# Patient Record
Sex: FEMALE | Race: WHITE | NOT HISPANIC OR LATINO | Employment: OTHER | URBAN - METROPOLITAN AREA
[De-identification: names, ages, dates, MRNs, and addresses within clinical notes are randomized per-mention and may not be internally consistent; named-entity substitution may affect disease eponyms.]

---

## 2018-11-06 ENCOUNTER — RECORDS - HEALTHEAST (OUTPATIENT)
Dept: LAB | Facility: CLINIC | Age: 83
End: 2018-11-06

## 2018-11-06 LAB — TSH SERPL DL<=0.005 MIU/L-ACNC: 0.99 UIU/ML (ref 0.3–5)

## 2019-11-07 ENCOUNTER — RECORDS - HEALTHEAST (OUTPATIENT)
Dept: LAB | Facility: CLINIC | Age: 84
End: 2019-11-07

## 2019-11-07 LAB — TSH SERPL DL<=0.005 MIU/L-ACNC: 1.67 UIU/ML (ref 0.3–5)

## 2020-01-04 ENCOUNTER — HOSPITAL ENCOUNTER (INPATIENT)
Facility: CLINIC | Age: 85
LOS: 10 days | Discharge: NURSING, REHAB OR LONG-TERM CARE | DRG: 516 | End: 2020-01-14
Attending: INTERNAL MEDICINE | Admitting: SURGERY
Payer: COMMERCIAL

## 2020-01-04 ENCOUNTER — APPOINTMENT (OUTPATIENT)
Dept: MRI IMAGING | Facility: CLINIC | Age: 85
DRG: 516 | End: 2020-01-04
Attending: STUDENT IN AN ORGANIZED HEALTH CARE EDUCATION/TRAINING PROGRAM
Payer: COMMERCIAL

## 2020-01-04 ENCOUNTER — APPOINTMENT (OUTPATIENT)
Dept: CT IMAGING | Facility: CLINIC | Age: 85
DRG: 516 | End: 2020-01-04
Attending: INTERNAL MEDICINE
Payer: COMMERCIAL
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
## 2020-01-04 DIAGNOSIS — S32.10XA CLOSED FRACTURE OF SACRUM, UNSPECIFIED PORTION OF SACRUM, INITIAL ENCOUNTER (H): ICD-10-CM
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
## 2020-01-04 DIAGNOSIS — Z98.890 S/P SPINAL SURGERY: ICD-10-CM

## 2020-01-04 DIAGNOSIS — S32.050A COMPRESSION FRACTURE OF L5 VERTEBRA, INITIAL ENCOUNTER (H): Primary | ICD-10-CM

## 2020-01-04 DIAGNOSIS — R32 URINARY INCONTINENCE, UNSPECIFIED TYPE: ICD-10-CM

## 2020-01-04 DIAGNOSIS — R20.0 SADDLE ANESTHESIA: ICD-10-CM

## 2020-01-04 LAB
ABO + RH BLD: NORMAL
ABO + RH BLD: NORMAL
ALBUMIN SERPL-MCNC: 3.5 G/DL (ref 3.4–5)
ALP SERPL-CCNC: 118 U/L (ref 40–150)
ALT SERPL W P-5'-P-CCNC: 21 U/L (ref 0–50)
ANION GAP SERPL CALCULATED.3IONS-SCNC: 5 MMOL/L (ref 3–14)
AST SERPL W P-5'-P-CCNC: 18 U/L (ref 0–45)
BASOPHILS # BLD AUTO: 0.1 10E9/L (ref 0–0.2)
BASOPHILS NFR BLD AUTO: 1 %
BILIRUB SERPL-MCNC: 0.5 MG/DL (ref 0.2–1.3)
BLD GP AB SCN SERPL QL: NORMAL
BLOOD BANK CMNT PATIENT-IMP: NORMAL
BUN SERPL-MCNC: 19 MG/DL (ref 7–30)
CALCIUM SERPL-MCNC: 9.2 MG/DL (ref 8.5–10.1)
CHLORIDE SERPL-SCNC: 101 MMOL/L (ref 94–109)
CO2 SERPL-SCNC: 27 MMOL/L (ref 20–32)
CREAT SERPL-MCNC: 0.92 MG/DL (ref 0.52–1.04)
DIFFERENTIAL METHOD BLD: ABNORMAL
EOSINOPHIL # BLD AUTO: 0.2 10E9/L (ref 0–0.7)
EOSINOPHIL NFR BLD AUTO: 2.3 %
ERYTHROCYTE [DISTWIDTH] IN BLOOD BY AUTOMATED COUNT: 12.6 % (ref 10–15)
GFR SERPL CREATININE-BSD FRML MDRD: 55 ML/MIN/{1.73_M2}
GLUCOSE SERPL-MCNC: 95 MG/DL (ref 70–99)
HCT VFR BLD AUTO: 35.7 % (ref 35–47)
HGB BLD-MCNC: 12 G/DL (ref 11.7–15.7)
IMM GRANULOCYTES # BLD: 0.1 10E9/L (ref 0–0.4)
IMM GRANULOCYTES NFR BLD: 1.2 %
INR PPP: 1.06 (ref 0.86–1.14)
LYMPHOCYTES # BLD AUTO: 1.6 10E9/L (ref 0.8–5.3)
LYMPHOCYTES NFR BLD AUTO: 17.2 %
MCH RBC QN AUTO: 35.2 PG (ref 26.5–33)
MCHC RBC AUTO-ENTMCNC: 33.6 G/DL (ref 31.5–36.5)
MCV RBC AUTO: 105 FL (ref 78–100)
MONOCYTES # BLD AUTO: 1 10E9/L (ref 0–1.3)
MONOCYTES NFR BLD AUTO: 10.6 %
NEUTROPHILS # BLD AUTO: 6.3 10E9/L (ref 1.6–8.3)
NEUTROPHILS NFR BLD AUTO: 67.7 %
NRBC # BLD AUTO: 0 10*3/UL
NRBC BLD AUTO-RTO: 0 /100
PLATELET # BLD AUTO: 210 10E9/L (ref 150–450)
POTASSIUM SERPL-SCNC: 4.1 MMOL/L (ref 3.4–5.3)
PROT SERPL-MCNC: 7 G/DL (ref 6.8–8.8)
RBC # BLD AUTO: 3.41 10E12/L (ref 3.8–5.2)
SODIUM SERPL-SCNC: 133 MMOL/L (ref 133–144)
SPECIMEN EXP DATE BLD: NORMAL
WBC # BLD AUTO: 9.3 10E9/L (ref 4–11)

## 2020-01-04 PROCEDURE — 99291 CRITICAL CARE FIRST HOUR: CPT | Mod: Z6 | Performed by: INTERNAL MEDICINE

## 2020-01-04 PROCEDURE — 85610 PROTHROMBIN TIME: CPT | Performed by: INTERNAL MEDICINE

## 2020-01-04 PROCEDURE — 25800030 ZZH RX IP 258 OP 636: Performed by: INTERNAL MEDICINE

## 2020-01-04 PROCEDURE — 80053 COMPREHEN METABOLIC PANEL: CPT | Performed by: INTERNAL MEDICINE

## 2020-01-04 PROCEDURE — 85025 COMPLETE CBC W/AUTO DIFF WBC: CPT | Performed by: INTERNAL MEDICINE

## 2020-01-04 PROCEDURE — 86901 BLOOD TYPING SEROLOGIC RH(D): CPT | Performed by: INTERNAL MEDICINE

## 2020-01-04 PROCEDURE — 25000132 ZZH RX MED GY IP 250 OP 250 PS 637: Performed by: STUDENT IN AN ORGANIZED HEALTH CARE EDUCATION/TRAINING PROGRAM

## 2020-01-04 PROCEDURE — 68200002 ZZH TRAUMA EVALUATION W/O CC LEVEL II: Performed by: INTERNAL MEDICINE

## 2020-01-04 PROCEDURE — 72192 CT PELVIS W/O DYE: CPT

## 2020-01-04 PROCEDURE — 12000001 ZZH R&B MED SURG/OB UMMC

## 2020-01-04 PROCEDURE — 25000132 ZZH RX MED GY IP 250 OP 250 PS 637: Performed by: INTERNAL MEDICINE

## 2020-01-04 PROCEDURE — 96361 HYDRATE IV INFUSION ADD-ON: CPT | Performed by: INTERNAL MEDICINE

## 2020-01-04 PROCEDURE — 86850 RBC ANTIBODY SCREEN: CPT | Performed by: INTERNAL MEDICINE

## 2020-01-04 PROCEDURE — 99285 EMERGENCY DEPT VISIT HI MDM: CPT | Mod: 25 | Performed by: INTERNAL MEDICINE

## 2020-01-04 PROCEDURE — 25000125 ZZHC RX 250: Performed by: INTERNAL MEDICINE

## 2020-01-04 PROCEDURE — 86900 BLOOD TYPING SEROLOGIC ABO: CPT | Performed by: INTERNAL MEDICINE

## 2020-01-04 PROCEDURE — 72146 MRI CHEST SPINE W/O DYE: CPT

## 2020-01-04 PROCEDURE — 51798 US URINE CAPACITY MEASURE: CPT | Performed by: INTERNAL MEDICINE

## 2020-01-04 PROCEDURE — 96360 HYDRATION IV INFUSION INIT: CPT | Performed by: INTERNAL MEDICINE

## 2020-01-04 RX ORDER — BRIMONIDINE TARTRATE 2 MG/ML
2 SOLUTION/ DROPS OPHTHALMIC ONCE
Status: COMPLETED | OUTPATIENT
Start: 2020-01-04 | End: 2020-01-04

## 2020-01-04 RX ORDER — LEVOTHYROXINE SODIUM 75 UG/1
75 TABLET ORAL DAILY
COMMUNITY

## 2020-01-04 RX ORDER — DORZOLAMIDE HYDROCHLORIDE AND TIMOLOL MALEATE 20; 5 MG/ML; MG/ML
2 SOLUTION/ DROPS OPHTHALMIC ONCE
Status: COMPLETED | OUTPATIENT
Start: 2020-01-04 | End: 2020-01-04

## 2020-01-04 RX ORDER — SODIUM CHLORIDE, SODIUM LACTATE, POTASSIUM CHLORIDE, CALCIUM CHLORIDE 600; 310; 30; 20 MG/100ML; MG/100ML; MG/100ML; MG/100ML
INJECTION, SOLUTION INTRAVENOUS CONTINUOUS
Status: DISCONTINUED | OUTPATIENT
Start: 2020-01-04 | End: 2020-01-05

## 2020-01-04 RX ORDER — MULTIPLE VITAMINS W/ MINERALS TAB 9MG-400MCG
1 TAB ORAL DAILY
COMMUNITY

## 2020-01-04 RX ORDER — CALCIUM ACETATE 667 MG/1
500 CAPSULE ORAL DAILY
COMMUNITY

## 2020-01-04 RX ORDER — DORZOLAMIDE HYDROCHLORIDE AND TIMOLOL MALEATE 20; 5 MG/ML; MG/ML
2 SOLUTION/ DROPS OPHTHALMIC 2 TIMES DAILY
COMMUNITY

## 2020-01-04 RX ORDER — OXYCODONE HYDROCHLORIDE 5 MG/1
5 TABLET ORAL EVERY 6 HOURS PRN
Status: DISCONTINUED | OUTPATIENT
Start: 2020-01-04 | End: 2020-01-05

## 2020-01-04 RX ORDER — ASPIRIN 81 MG/1
81 TABLET ORAL DAILY
COMMUNITY

## 2020-01-04 RX ORDER — BRIMONIDINE TARTRATE 1.5 MG/ML
2 SOLUTION/ DROPS OPHTHALMIC 2 TIMES DAILY
COMMUNITY

## 2020-01-04 RX ADMIN — BRIMONIDINE TARTRATE 2 DROP: 2 SOLUTION/ DROPS OPHTHALMIC at 22:44

## 2020-01-04 RX ADMIN — SODIUM CHLORIDE, POTASSIUM CHLORIDE, SODIUM LACTATE AND CALCIUM CHLORIDE: 600; 310; 30; 20 INJECTION, SOLUTION INTRAVENOUS at 22:29

## 2020-01-04 RX ADMIN — DORZOLAMIDE HYDROCHLORIDE AND TIMOLOL MALEATE 2 DROP: 20; 5 SOLUTION/ DROPS OPHTHALMIC at 22:45

## 2020-01-04 RX ADMIN — OXYCODONE HYDROCHLORIDE 5 MG: 5 TABLET ORAL at 22:47

## 2020-01-04 ASSESSMENT — MIFFLIN-ST. JEOR: SCORE: 956.23

## 2020-01-04 ASSESSMENT — ENCOUNTER SYMPTOMS
NUMBNESS: 1
FEVER: 0

## 2020-01-04 NOTE — LETTER
Health Information Management Services               Recipient: Iman Dasilva          Sender: YUDI Downey [phone]          Date: January 14, 2020  Patient Name:  Jyoti De Jesus  Routing Message:  Discharge Orders  [id number]          The documents accompanying this notice contain confidential information belonging to the sender.  This information is intended only for the use of the individual or entity named above.  The authorized recipient of this information is prohibited from disclosing this information to any other party and is required to destroy the information after its stated need has been fulfilled, unless otherwise required by state law.      If you are not the intended recipient, you are hereby notified that any disclosure, copy, distribution or action taken in reliance on the contents of these documents is strictly prohibited.  If you have received this document in error, please return it by fax to [phone] with a note on the cover sheet explaining why you are returning it (e.g. not your patient, not your provider, etc.).  If you need further assistance, please call [employer] Centralized Transcription at [phone].  Documents may also be returned by mail to [employer], , [facility] [address], [place], [place] [address].

## 2020-01-04 NOTE — LETTER
Transition Communication Hand-off for Care Transitions to Next Level of Care Provider    Name: Jyoti De Jesus  : 1928  MRN #: [id number]  Primary Care Provider: Jet Matt     Primary Clinic: [facility] [address]  [facility] [address]     Reason for Hospitalization:  Saddle anesthesia [R20.0]  Fall, initial encounter [W19.XXXA]  Closed fracture of sacrum, unspecified portion of sacrum, initial encounter (H) [S32.10XA]  Urinary incontinence, unspecified type [R32]  Admit Date/Time: 2020  5:57 PM  Discharge Date: 2020 2:00 PM  Payor Source: Payor: MEDICA / Plan: MEDICA ADVANT[employer]IONS / Product Type: HMO /          Reason for Communication Hand-off Referral: Other continuity of care    Discharge Plan: [facility]        Concern for non-adherence with plan of care:   NO  Discharge Needs Assessment:  Needs      Most Recent Value   Equipment Currently Used at Home  none          Future Appointments   Date Time Provider Department Center   2020 12:30 PM Wai Mason MD [facility]   3/19/2020  2:45 PM Wai Mason MD [facility]       Any outstanding tests or procedures:        Referrals     Future Labs/Procedures    Occupational Therapy Adult Consult     Comments:    Evaluate and treat as clinically indicated.    Reason:  Postop fixation of fragility sacral fracture    Physical Therapy Adult Consult     Comments:    Evaluate and treat as clinically indicated.    Reason:  Postop fixation of fragility sacral fracture            YUDI Corral  5A/10A Ortho/Med/Surg & [facility] Adult ED  Phone: [phone] Pager: [phone]

## 2020-01-05 ENCOUNTER — APPOINTMENT (OUTPATIENT)
Dept: GENERAL RADIOLOGY | Facility: CLINIC | Age: 85
DRG: 516 | End: 2020-01-05
Attending: ORTHOPAEDIC SURGERY
Payer: COMMERCIAL

## 2020-01-05 ENCOUNTER — APPOINTMENT (OUTPATIENT)
Dept: MRI IMAGING | Facility: CLINIC | Age: 85
DRG: 516 | End: 2020-01-05
Attending: STUDENT IN AN ORGANIZED HEALTH CARE EDUCATION/TRAINING PROGRAM
Payer: COMMERCIAL

## 2020-01-05 ENCOUNTER — APPOINTMENT (OUTPATIENT)
Dept: MRI IMAGING | Facility: CLINIC | Age: 85
DRG: 516 | End: 2020-01-05
Payer: COMMERCIAL

## 2020-01-05 ENCOUNTER — ANESTHESIA EVENT (OUTPATIENT)
Dept: SURGERY | Facility: CLINIC | Age: 85
DRG: 516 | End: 2020-01-05
Payer: COMMERCIAL

## 2020-01-05 ENCOUNTER — ANESTHESIA (OUTPATIENT)
Dept: SURGERY | Facility: CLINIC | Age: 85
DRG: 516 | End: 2020-01-05
Payer: COMMERCIAL

## 2020-01-05 PROBLEM — S32.050A COMPRESSION FRACTURE OF L5 VERTEBRA (H): Status: ACTIVE | Noted: 2020-01-05

## 2020-01-05 PROBLEM — Z98.890 S/P SPINAL SURGERY: Status: ACTIVE | Noted: 2020-01-05

## 2020-01-05 LAB
ALBUMIN UR-MCNC: NEGATIVE MG/DL
APPEARANCE UR: CLEAR
BILIRUB UR QL STRIP: NEGATIVE
COLOR UR AUTO: ABNORMAL
GLUCOSE UR STRIP-MCNC: NEGATIVE MG/DL
HGB UR QL STRIP: NEGATIVE
KETONES UR STRIP-MCNC: 10 MG/DL
LEUKOCYTE ESTERASE UR QL STRIP: NEGATIVE
NITRATE UR QL: NEGATIVE
PH UR STRIP: 5.5 PH (ref 5–7)
SOURCE: ABNORMAL
SP GR UR STRIP: 1.01 (ref 1–1.03)
UROBILINOGEN UR STRIP-MCNC: NORMAL MG/DL (ref 0–2)

## 2020-01-05 PROCEDURE — 40000170 ZZH STATISTIC PRE-PROCEDURE ASSESSMENT II: Performed by: ORTHOPAEDIC SURGERY

## 2020-01-05 PROCEDURE — 25000128 H RX IP 250 OP 636: Performed by: ORTHOPAEDIC SURGERY

## 2020-01-05 PROCEDURE — 71000014 ZZH RECOVERY PHASE 1 LEVEL 2 FIRST HR: Performed by: ORTHOPAEDIC SURGERY

## 2020-01-05 PROCEDURE — 12000001 ZZH R&B MED SURG/OB UMMC

## 2020-01-05 PROCEDURE — 25000132 ZZH RX MED GY IP 250 OP 250 PS 637: Performed by: STUDENT IN AN ORGANIZED HEALTH CARE EDUCATION/TRAINING PROGRAM

## 2020-01-05 PROCEDURE — 25000128 H RX IP 250 OP 636: Performed by: ANESTHESIOLOGY

## 2020-01-05 PROCEDURE — 25000128 H RX IP 250 OP 636

## 2020-01-05 PROCEDURE — 37000008 ZZH ANESTHESIA TECHNICAL FEE, 1ST 30 MIN: Performed by: ORTHOPAEDIC SURGERY

## 2020-01-05 PROCEDURE — 25000125 ZZHC RX 250: Performed by: ORTHOPAEDIC SURGERY

## 2020-01-05 PROCEDURE — 37000009 ZZH ANESTHESIA TECHNICAL FEE, EACH ADDTL 15 MIN: Performed by: ORTHOPAEDIC SURGERY

## 2020-01-05 PROCEDURE — 25000125 ZZHC RX 250: Performed by: NURSE ANESTHETIST, CERTIFIED REGISTERED

## 2020-01-05 PROCEDURE — 25800030 ZZH RX IP 258 OP 636: Performed by: STUDENT IN AN ORGANIZED HEALTH CARE EDUCATION/TRAINING PROGRAM

## 2020-01-05 PROCEDURE — 25000128 H RX IP 250 OP 636: Performed by: NURSE ANESTHETIST, CERTIFIED REGISTERED

## 2020-01-05 PROCEDURE — C1769 GUIDE WIRE: HCPCS | Performed by: ORTHOPAEDIC SURGERY

## 2020-01-05 PROCEDURE — 25000125 ZZHC RX 250: Performed by: STUDENT IN AN ORGANIZED HEALTH CARE EDUCATION/TRAINING PROGRAM

## 2020-01-05 PROCEDURE — 27210794 ZZH OR GENERAL SUPPLY STERILE: Performed by: ORTHOPAEDIC SURGERY

## 2020-01-05 PROCEDURE — 25800030 ZZH RX IP 258 OP 636: Performed by: ANESTHESIOLOGY

## 2020-01-05 PROCEDURE — C1713 ANCHOR/SCREW BN/BN,TIS/BN: HCPCS | Performed by: ORTHOPAEDIC SURGERY

## 2020-01-05 PROCEDURE — 99207 ZZC CONSULT E&M CHANGED TO SUBSEQUENT LEVEL: CPT | Performed by: INTERNAL MEDICINE

## 2020-01-05 PROCEDURE — 27211020 ZZHC OR CELL SAVER OPNP: Performed by: ORTHOPAEDIC SURGERY

## 2020-01-05 PROCEDURE — 71000015 ZZH RECOVERY PHASE 1 LEVEL 2 EA ADDTL HR: Performed by: ORTHOPAEDIC SURGERY

## 2020-01-05 PROCEDURE — 27810169 ZZH OR IMPLANT GENERAL: Performed by: ORTHOPAEDIC SURGERY

## 2020-01-05 PROCEDURE — 25800030 ZZH RX IP 258 OP 636: Performed by: NURSE ANESTHETIST, CERTIFIED REGISTERED

## 2020-01-05 PROCEDURE — 72195 MRI PELVIS W/O DYE: CPT

## 2020-01-05 PROCEDURE — 27211024 ZZHC OR SUPPLY OTHER OPNP: Performed by: ORTHOPAEDIC SURGERY

## 2020-01-05 PROCEDURE — 36000066 ZZH SURGERY LEVEL 4 W FLUORO 1ST 30 MIN - UMMC: Performed by: ORTHOPAEDIC SURGERY

## 2020-01-05 PROCEDURE — 40000280 XR SURGERY CARM FLUORO GREATER THAN 5 MIN: Mod: TC

## 2020-01-05 PROCEDURE — 27210995 ZZH RX 272: Performed by: ORTHOPAEDIC SURGERY

## 2020-01-05 PROCEDURE — 25800025 ZZH RX 258: Performed by: ORTHOPAEDIC SURGERY

## 2020-01-05 PROCEDURE — 36000064 ZZH SURGERY LEVEL 4 EA 15 ADDTL MIN - UMMC: Performed by: ORTHOPAEDIC SURGERY

## 2020-01-05 PROCEDURE — 25000565 ZZH ISOFLURANE, EA 15 MIN: Performed by: ORTHOPAEDIC SURGERY

## 2020-01-05 PROCEDURE — 99232 SBSQ HOSP IP/OBS MODERATE 35: CPT | Performed by: INTERNAL MEDICINE

## 2020-01-05 PROCEDURE — 72148 MRI LUMBAR SPINE W/O DYE: CPT

## 2020-01-05 PROCEDURE — 81003 URINALYSIS AUTO W/O SCOPE: CPT | Performed by: STUDENT IN AN ORGANIZED HEALTH CARE EDUCATION/TRAINING PROGRAM

## 2020-01-05 DEVICE — IMP ROD MEDT SOLERA TIAL STR LINED 5.5X500MM 1554200500: Type: IMPLANTABLE DEVICE | Site: BACK | Status: FUNCTIONAL

## 2020-01-05 DEVICE — IMPLANTABLE DEVICE: Type: IMPLANTABLE DEVICE | Site: BACK | Status: FUNCTIONAL

## 2020-01-05 DEVICE — VERTEBROPLASTY CEMENT W/BONE MIXER C01B: Type: IMPLANTABLE DEVICE | Site: BACK | Status: FUNCTIONAL

## 2020-01-05 DEVICE — IMP SCR MEDT 5.5/6.0MM SOLERA 5.5X35MM MA 55840005535: Type: IMPLANTABLE DEVICE | Site: BACK | Status: FUNCTIONAL

## 2020-01-05 DEVICE — IMP SCR SET MEDT SOLERA BREAK OFF 5.5MM TI 5540030: Type: IMPLANTABLE DEVICE | Site: BACK | Status: FUNCTIONAL

## 2020-01-05 DEVICE — BONE CEMENT KYPHX HV-R C01A: Type: IMPLANTABLE DEVICE | Site: BACK | Status: FUNCTIONAL

## 2020-01-05 RX ORDER — ONDANSETRON 2 MG/ML
4 INJECTION INTRAMUSCULAR; INTRAVENOUS EVERY 30 MIN PRN
Status: DISCONTINUED | OUTPATIENT
Start: 2020-01-05 | End: 2020-01-05 | Stop reason: HOSPADM

## 2020-01-05 RX ORDER — LIDOCAINE HYDROCHLORIDE 20 MG/ML
INJECTION, SOLUTION INFILTRATION; PERINEURAL PRN
Status: DISCONTINUED | OUTPATIENT
Start: 2020-01-05 | End: 2020-01-05

## 2020-01-05 RX ORDER — ONDANSETRON 2 MG/ML
4 INJECTION INTRAMUSCULAR; INTRAVENOUS EVERY 6 HOURS PRN
Status: DISCONTINUED | OUTPATIENT
Start: 2020-01-05 | End: 2020-01-14 | Stop reason: HOSPADM

## 2020-01-05 RX ORDER — LIDOCAINE 40 MG/G
CREAM TOPICAL
Status: CANCELLED | OUTPATIENT
Start: 2020-01-05

## 2020-01-05 RX ORDER — CALCIUM ACETATE 667 MG/1
500 CAPSULE ORAL DAILY
Status: DISCONTINUED | OUTPATIENT
Start: 2020-01-05 | End: 2020-01-05

## 2020-01-05 RX ORDER — FENTANYL CITRATE 50 UG/ML
INJECTION, SOLUTION INTRAMUSCULAR; INTRAVENOUS PRN
Status: DISCONTINUED | OUTPATIENT
Start: 2020-01-05 | End: 2020-01-05

## 2020-01-05 RX ORDER — BRIMONIDINE TARTRATE 2 MG/ML
2 SOLUTION/ DROPS OPHTHALMIC 2 TIMES DAILY
Status: DISCONTINUED | OUTPATIENT
Start: 2020-01-05 | End: 2020-01-14 | Stop reason: HOSPADM

## 2020-01-05 RX ORDER — HYDROMORPHONE HYDROCHLORIDE 1 MG/ML
.1-.2 INJECTION, SOLUTION INTRAMUSCULAR; INTRAVENOUS; SUBCUTANEOUS EVERY 5 MIN PRN
Status: DISCONTINUED | OUTPATIENT
Start: 2020-01-05 | End: 2020-01-05 | Stop reason: HOSPADM

## 2020-01-05 RX ORDER — CALCIUM CARBONATE 500(1250)
500 TABLET ORAL 2 TIMES DAILY WITH MEALS
Status: DISCONTINUED | OUTPATIENT
Start: 2020-01-05 | End: 2020-01-07

## 2020-01-05 RX ORDER — ONDANSETRON 4 MG/1
4 TABLET, ORALLY DISINTEGRATING ORAL EVERY 6 HOURS PRN
Status: DISCONTINUED | OUTPATIENT
Start: 2020-01-05 | End: 2020-01-14 | Stop reason: HOSPADM

## 2020-01-05 RX ORDER — BUPIVACAINE HYDROCHLORIDE AND EPINEPHRINE 2.5; 5 MG/ML; UG/ML
INJECTION, SOLUTION INFILTRATION; PERINEURAL PRN
Status: DISCONTINUED | OUTPATIENT
Start: 2020-01-05 | End: 2020-01-05 | Stop reason: HOSPADM

## 2020-01-05 RX ORDER — ACETAMINOPHEN 325 MG/1
650 TABLET ORAL EVERY 4 HOURS PRN
Status: DISCONTINUED | OUTPATIENT
Start: 2020-01-05 | End: 2020-01-05

## 2020-01-05 RX ORDER — ONDANSETRON 4 MG/1
4 TABLET, ORALLY DISINTEGRATING ORAL EVERY 30 MIN PRN
Status: DISCONTINUED | OUTPATIENT
Start: 2020-01-05 | End: 2020-01-05 | Stop reason: HOSPADM

## 2020-01-05 RX ORDER — SODIUM CHLORIDE, SODIUM LACTATE, POTASSIUM CHLORIDE, CALCIUM CHLORIDE 600; 310; 30; 20 MG/100ML; MG/100ML; MG/100ML; MG/100ML
INJECTION, SOLUTION INTRAVENOUS CONTINUOUS
Status: DISCONTINUED | OUTPATIENT
Start: 2020-01-05 | End: 2020-01-05 | Stop reason: HOSPADM

## 2020-01-05 RX ORDER — OXYCODONE HYDROCHLORIDE 5 MG/1
5 TABLET ORAL
Status: DISCONTINUED | OUTPATIENT
Start: 2020-01-05 | End: 2020-01-14 | Stop reason: HOSPADM

## 2020-01-05 RX ORDER — ONDANSETRON 4 MG/1
4 TABLET, ORALLY DISINTEGRATING ORAL EVERY 6 HOURS PRN
Status: CANCELLED | OUTPATIENT
Start: 2020-01-05

## 2020-01-05 RX ORDER — NALOXONE HYDROCHLORIDE 0.4 MG/ML
.1-.4 INJECTION, SOLUTION INTRAMUSCULAR; INTRAVENOUS; SUBCUTANEOUS
Status: ACTIVE | OUTPATIENT
Start: 2020-01-05 | End: 2020-01-06

## 2020-01-05 RX ORDER — ONDANSETRON 2 MG/ML
INJECTION INTRAMUSCULAR; INTRAVENOUS PRN
Status: DISCONTINUED | OUTPATIENT
Start: 2020-01-05 | End: 2020-01-05

## 2020-01-05 RX ORDER — LANOLIN ALCOHOL/MO/W.PET/CERES
3 CREAM (GRAM) TOPICAL
Status: DISCONTINUED | OUTPATIENT
Start: 2020-01-05 | End: 2020-01-14 | Stop reason: HOSPADM

## 2020-01-05 RX ORDER — ACETAMINOPHEN 325 MG/1
975 TABLET ORAL EVERY 8 HOURS
Status: COMPLETED | OUTPATIENT
Start: 2020-01-05 | End: 2020-01-08

## 2020-01-05 RX ORDER — LEVOTHYROXINE SODIUM 25 UG/1
75 TABLET ORAL DAILY
Status: DISCONTINUED | OUTPATIENT
Start: 2020-01-05 | End: 2020-01-14 | Stop reason: HOSPADM

## 2020-01-05 RX ORDER — NALOXONE HYDROCHLORIDE 0.4 MG/ML
.1-.4 INJECTION, SOLUTION INTRAMUSCULAR; INTRAVENOUS; SUBCUTANEOUS
Status: DISCONTINUED | OUTPATIENT
Start: 2020-01-05 | End: 2020-01-14 | Stop reason: HOSPADM

## 2020-01-05 RX ORDER — FENTANYL CITRATE 50 UG/ML
25-50 INJECTION, SOLUTION INTRAMUSCULAR; INTRAVENOUS
Status: DISCONTINUED | OUTPATIENT
Start: 2020-01-05 | End: 2020-01-05 | Stop reason: HOSPADM

## 2020-01-05 RX ORDER — HYDROMORPHONE HYDROCHLORIDE 1 MG/ML
0.2 INJECTION, SOLUTION INTRAMUSCULAR; INTRAVENOUS; SUBCUTANEOUS
Status: DISCONTINUED | OUTPATIENT
Start: 2020-01-05 | End: 2020-01-14 | Stop reason: HOSPADM

## 2020-01-05 RX ORDER — SODIUM CHLORIDE, SODIUM LACTATE, POTASSIUM CHLORIDE, CALCIUM CHLORIDE 600; 310; 30; 20 MG/100ML; MG/100ML; MG/100ML; MG/100ML
INJECTION, SOLUTION INTRAVENOUS CONTINUOUS PRN
Status: DISCONTINUED | OUTPATIENT
Start: 2020-01-05 | End: 2020-01-05

## 2020-01-05 RX ORDER — CEFAZOLIN SODIUM 1 G/3ML
INJECTION, POWDER, FOR SOLUTION INTRAMUSCULAR; INTRAVENOUS PRN
Status: DISCONTINUED | OUTPATIENT
Start: 2020-01-05 | End: 2020-01-05

## 2020-01-05 RX ORDER — PROPOFOL 10 MG/ML
INJECTION, EMULSION INTRAVENOUS PRN
Status: DISCONTINUED | OUTPATIENT
Start: 2020-01-05 | End: 2020-01-05

## 2020-01-05 RX ORDER — SODIUM CHLORIDE, SODIUM LACTATE, POTASSIUM CHLORIDE, CALCIUM CHLORIDE 600; 310; 30; 20 MG/100ML; MG/100ML; MG/100ML; MG/100ML
INJECTION, SOLUTION INTRAVENOUS CONTINUOUS
Status: DISCONTINUED | OUTPATIENT
Start: 2020-01-05 | End: 2020-01-14 | Stop reason: HOSPADM

## 2020-01-05 RX ORDER — LIDOCAINE 40 MG/G
CREAM TOPICAL
Status: DISCONTINUED | OUTPATIENT
Start: 2020-01-05 | End: 2020-01-14 | Stop reason: HOSPADM

## 2020-01-05 RX ORDER — ACETAMINOPHEN 325 MG/1
650 TABLET ORAL EVERY 4 HOURS PRN
Status: DISCONTINUED | OUTPATIENT
Start: 2020-01-08 | End: 2020-01-14 | Stop reason: HOSPADM

## 2020-01-05 RX ORDER — DORZOLAMIDE HYDROCHLORIDE AND TIMOLOL MALEATE 20; 5 MG/ML; MG/ML
2 SOLUTION/ DROPS OPHTHALMIC 2 TIMES DAILY
Status: DISCONTINUED | OUTPATIENT
Start: 2020-01-05 | End: 2020-01-14 | Stop reason: HOSPADM

## 2020-01-05 RX ORDER — POLYETHYLENE GLYCOL 3350 17 G/17G
17 POWDER, FOR SOLUTION ORAL DAILY PRN
Status: DISCONTINUED | OUTPATIENT
Start: 2020-01-05 | End: 2020-01-14 | Stop reason: HOSPADM

## 2020-01-05 RX ORDER — AMOXICILLIN 250 MG
1-2 CAPSULE ORAL 2 TIMES DAILY
Status: DISCONTINUED | OUTPATIENT
Start: 2020-01-05 | End: 2020-01-14 | Stop reason: HOSPADM

## 2020-01-05 RX ORDER — MULTIPLE VITAMINS W/ MINERALS TAB 9MG-400MCG
1 TAB ORAL DAILY
Status: DISCONTINUED | OUTPATIENT
Start: 2020-01-05 | End: 2020-01-14 | Stop reason: HOSPADM

## 2020-01-05 RX ORDER — ONDANSETRON 2 MG/ML
4 INJECTION INTRAMUSCULAR; INTRAVENOUS EVERY 6 HOURS PRN
Status: CANCELLED | OUTPATIENT
Start: 2020-01-05

## 2020-01-05 RX ORDER — KETAMINE HYDROCHLORIDE 10 MG/ML
INJECTION, SOLUTION INTRAMUSCULAR; INTRAVENOUS PRN
Status: DISCONTINUED | OUTPATIENT
Start: 2020-01-05 | End: 2020-01-05

## 2020-01-05 RX ADMIN — PHENYLEPHRINE HYDROCHLORIDE 100 MCG: 10 INJECTION INTRAVENOUS at 13:30

## 2020-01-05 RX ADMIN — SODIUM CHLORIDE, POTASSIUM CHLORIDE, SODIUM LACTATE AND CALCIUM CHLORIDE: 600; 310; 30; 20 INJECTION, SOLUTION INTRAVENOUS at 02:03

## 2020-01-05 RX ADMIN — ROCURONIUM BROMIDE 10 MG: 10 INJECTION INTRAVENOUS at 13:59

## 2020-01-05 RX ADMIN — HYDROMORPHONE HYDROCHLORIDE 0.2 MG: 1 INJECTION, SOLUTION INTRAMUSCULAR; INTRAVENOUS; SUBCUTANEOUS at 17:54

## 2020-01-05 RX ADMIN — FENTANYL CITRATE 25 MCG: 50 INJECTION, SOLUTION INTRAMUSCULAR; INTRAVENOUS at 13:53

## 2020-01-05 RX ADMIN — FENTANYL CITRATE 50 MCG: 50 INJECTION, SOLUTION INTRAMUSCULAR; INTRAVENOUS at 13:14

## 2020-01-05 RX ADMIN — ACETAMINOPHEN 975 MG: 325 TABLET, FILM COATED ORAL at 19:27

## 2020-01-05 RX ADMIN — Medication 5 MG: at 16:28

## 2020-01-05 RX ADMIN — PROPOFOL 70 MG: 10 INJECTION, EMULSION INTRAVENOUS at 13:14

## 2020-01-05 RX ADMIN — FENTANYL CITRATE 25 MCG: 50 INJECTION, SOLUTION INTRAMUSCULAR; INTRAVENOUS at 17:30

## 2020-01-05 RX ADMIN — CEFAZOLIN 2 G: 1 INJECTION, POWDER, FOR SOLUTION INTRAMUSCULAR; INTRAVENOUS at 13:45

## 2020-01-05 RX ADMIN — LIDOCAINE HYDROCHLORIDE 60 MG: 20 INJECTION, SOLUTION INFILTRATION; PERINEURAL at 13:14

## 2020-01-05 RX ADMIN — PHENYLEPHRINE HYDROCHLORIDE 100 MCG: 10 INJECTION INTRAVENOUS at 13:59

## 2020-01-05 RX ADMIN — SENNOSIDES AND DOCUSATE SODIUM 2 TABLET: 8.6; 5 TABLET ORAL at 19:28

## 2020-01-05 RX ADMIN — FENTANYL CITRATE 25 MCG: 50 INJECTION, SOLUTION INTRAMUSCULAR; INTRAVENOUS at 17:39

## 2020-01-05 RX ADMIN — Medication 5 MG: at 15:31

## 2020-01-05 RX ADMIN — Medication 5 MG: at 14:30

## 2020-01-05 RX ADMIN — FENTANYL CITRATE 25 MCG: 50 INJECTION, SOLUTION INTRAMUSCULAR; INTRAVENOUS at 17:24

## 2020-01-05 RX ADMIN — PHENYLEPHRINE HYDROCHLORIDE 50 MCG: 10 INJECTION INTRAVENOUS at 14:33

## 2020-01-05 RX ADMIN — ACETAMINOPHEN 650 MG: 325 TABLET, FILM COATED ORAL at 08:20

## 2020-01-05 RX ADMIN — PHENYLEPHRINE HYDROCHLORIDE 50 MCG: 10 INJECTION INTRAVENOUS at 16:09

## 2020-01-05 RX ADMIN — PHENYLEPHRINE HYDROCHLORIDE 50 MCG: 10 INJECTION INTRAVENOUS at 15:20

## 2020-01-05 RX ADMIN — PHENYLEPHRINE HYDROCHLORIDE 50 MCG: 10 INJECTION INTRAVENOUS at 16:53

## 2020-01-05 RX ADMIN — PHENYLEPHRINE HYDROCHLORIDE 50 MCG: 10 INJECTION INTRAVENOUS at 15:56

## 2020-01-05 RX ADMIN — OXYCODONE HYDROCHLORIDE 5 MG: 5 TABLET ORAL at 05:01

## 2020-01-05 RX ADMIN — Medication 13 MG: at 13:38

## 2020-01-05 RX ADMIN — LEVOTHYROXINE SODIUM 75 MCG: 25 TABLET ORAL at 08:20

## 2020-01-05 RX ADMIN — SODIUM CHLORIDE, POTASSIUM CHLORIDE, SODIUM LACTATE AND CALCIUM CHLORIDE: 600; 310; 30; 20 INJECTION, SOLUTION INTRAVENOUS at 21:06

## 2020-01-05 RX ADMIN — PHENYLEPHRINE HYDROCHLORIDE 0.3 MCG/KG/MIN: 10 INJECTION INTRAVENOUS at 13:35

## 2020-01-05 RX ADMIN — BRIMONIDINE TARTRATE 2 DROP: 2 SOLUTION/ DROPS OPHTHALMIC at 19:36

## 2020-01-05 RX ADMIN — CEFAZOLIN 1 G: 1 INJECTION, POWDER, FOR SOLUTION INTRAMUSCULAR; INTRAVENOUS at 15:45

## 2020-01-05 RX ADMIN — PHENYLEPHRINE HYDROCHLORIDE 100 MCG: 10 INJECTION INTRAVENOUS at 17:00

## 2020-01-05 RX ADMIN — LIDOCAINE HYDROCHLORIDE 1 MG/KG/HR: 20 INJECTION, SOLUTION INTRAVENOUS at 14:56

## 2020-01-05 RX ADMIN — BRIMONIDINE TARTRATE 2 DROP: 2 SOLUTION/ DROPS OPHTHALMIC at 09:57

## 2020-01-05 RX ADMIN — PHENYLEPHRINE HYDROCHLORIDE 50 MCG: 10 INJECTION INTRAVENOUS at 14:42

## 2020-01-05 RX ADMIN — ONDANSETRON 4 MG: 2 INJECTION INTRAMUSCULAR; INTRAVENOUS at 16:41

## 2020-01-05 RX ADMIN — PHENYLEPHRINE HYDROCHLORIDE 100 MCG: 10 INJECTION INTRAVENOUS at 13:39

## 2020-01-05 RX ADMIN — SODIUM CHLORIDE, POTASSIUM CHLORIDE, SODIUM LACTATE AND CALCIUM CHLORIDE: 600; 310; 30; 20 INJECTION, SOLUTION INTRAVENOUS at 13:10

## 2020-01-05 RX ADMIN — OXYCODONE HYDROCHLORIDE 5 MG: 5 TABLET ORAL at 22:09

## 2020-01-05 RX ADMIN — DORZOLAMIDE HYDROCHLORIDE AND TIMOLOL MALEATE 2 DROP: 20; 5 SOLUTION/ DROPS OPHTHALMIC at 09:47

## 2020-01-05 RX ADMIN — ACETAMINOPHEN 650 MG: 325 TABLET, FILM COATED ORAL at 01:57

## 2020-01-05 RX ADMIN — PHENYLEPHRINE HYDROCHLORIDE 100 MCG: 10 INJECTION INTRAVENOUS at 14:48

## 2020-01-05 RX ADMIN — ROCURONIUM BROMIDE 40 MG: 10 INJECTION INTRAVENOUS at 13:14

## 2020-01-05 RX ADMIN — PHENYLEPHRINE HYDROCHLORIDE 100 MCG: 10 INJECTION INTRAVENOUS at 13:24

## 2020-01-05 RX ADMIN — SUGAMMADEX 120 MG: 100 INJECTION, SOLUTION INTRAVENOUS at 16:56

## 2020-01-05 RX ADMIN — FENTANYL CITRATE 25 MCG: 50 INJECTION, SOLUTION INTRAMUSCULAR; INTRAVENOUS at 17:36

## 2020-01-05 RX ADMIN — OXYCODONE HYDROCHLORIDE 5 MG: 5 TABLET ORAL at 12:11

## 2020-01-05 RX ADMIN — HYDROMORPHONE HYDROCHLORIDE 0.1 MG: 1 INJECTION, SOLUTION INTRAMUSCULAR; INTRAVENOUS; SUBCUTANEOUS at 17:43

## 2020-01-05 RX ADMIN — OXYCODONE HYDROCHLORIDE 5 MG: 5 TABLET ORAL at 18:19

## 2020-01-05 RX ADMIN — FENTANYL CITRATE 25 MCG: 50 INJECTION, SOLUTION INTRAMUSCULAR; INTRAVENOUS at 15:22

## 2020-01-05 RX ADMIN — DORZOLAMIDE HYDROCHLORIDE AND TIMOLOL MALEATE 2 DROP: 20; 5 SOLUTION/ DROPS OPHTHALMIC at 19:36

## 2020-01-05 RX ADMIN — SODIUM CHLORIDE, POTASSIUM CHLORIDE, SODIUM LACTATE AND CALCIUM CHLORIDE: 600; 310; 30; 20 INJECTION, SOLUTION INTRAVENOUS at 16:33

## 2020-01-05 ASSESSMENT — ACTIVITIES OF DAILY LIVING (ADL)
BATHING: 2-->ASSISTIVE PERSON
WHICH_OF_THE_ABOVE_FUNCTIONAL_RISKS_HAD_A_RECENT_ONSET_OR_CHANGE?: AMBULATION
ADLS_ACUITY_SCORE: 19
COGNITION: 0 - NO COGNITION ISSUES REPORTED
RETIRED_COMMUNICATION: 0-->UNDERSTANDS/COMMUNICATES WITHOUT DIFFICULTY
ADLS_ACUITY_SCORE: 19
SWALLOWING: 0-->SWALLOWS FOODS/LIQUIDS WITHOUT DIFFICULTY
TOILETING: 0-->INDEPENDENT
RETIRED_EATING: 0-->INDEPENDENT
DRESS: 0-->INDEPENDENT
AMBULATION: 1-->ASSISTIVE EQUIPMENT
NUMBER_OF_TIMES_PATIENT_HAS_FALLEN_WITHIN_LAST_SIX_MONTHS: 1
TRANSFERRING: 1-->ASSISTIVE EQUIPMENT
FALL_HISTORY_WITHIN_LAST_SIX_MONTHS: YES

## 2020-01-05 NOTE — BRIEF OP NOTE
[facility], [place]    Brief Operative Note    Pre-operative diagnosis: * No pre-op diagnosis entered *  Post-operative diagnosis Same as pre-operative diagnosis    Procedure: Procedure(s):  OPEN REDUCTION INTERNAL FIXATION, FRACTURE, PELVIS, spinal instrumentation L4-S1, cement augmentation L4-l5, image guided surgery  Surgeon: Surgeon(s) and Role:     * Wai Mason MD - Primary     * Charanjit Lew MD - Assisting     * Rudolph Mukherjee MD - Resident - Assisting     * Leny Castillo MD  Anesthesia: General   Estimated blood loss: 275 mL  Drains: Hemovac  Specimens: * No specimens in log *  Findings:   None.  Complications: None.  Implants:   Implant Name Type Inv. Item Serial No.  Lot No. LRB No. Used   BONE CEMENT KYPHX HV-R C01A Cement, Bone BONE CEMENT KYPHX HV-R C01A  KYPHON [id number] N/A 1   VERTEBROPLASTY CEMENT W/BONE MIXER C01B Cement, Bone VERTEBROPLASTY CEMENT W/BONE MIXER C01B  KYPHON  N/A 1   IMP SCR SET MEDT SOLERA BREAK OFF 5.5MM TI [id number] Metallic Hardware/Wheeler IMP SCR SET MEDT SOLERA BREAK OFF 5.5MM TI [id number]  [employer]  N/A 6   5.5 x 40 fenestrated    [employer]  N/A 2   6.5 x 40 fenestrated    [employer]  N/A 2   9.5 x 90 ballast screw    [employer]  N/A 2   IMP SCR CAN [id number] W/32MM THRD .450 Metallic Hardware/Wheeler IMP SCR CAN [id number] W/32MM THRD .450  [employer]ES[employer]AT[employer]  N/A 2   IMP SCR CAN 6.5X20MM FT .460 Metallic Hardware/Wheeler IMP SCR CAN 6.5X20MM FT .460  SYNTHES[employer]ATEC  N/A 1   IMP SCR MEDT 5.5/6.0MM SOLERA 5.5X35MM MA [id number] Metallic Hardware/Wheeler IMP SCR MEDT 5.5/6.0MM SOLERA 5.5X35MM MA [id number]  [employer]  N/A 2   IMP WIL MEDT SOLERA TIAL STR LINED [id number] Metallic Hardware/Wheeler IMP WIL MEDT SOLERA TIAL STR LINED 5.0N039YB [id number]  [employer]  N/A 2   IMP SCR CAN 6.2D288IB FT .482 Metallic Hardware/Wheeler IMP SCR CAN 6.5R604FN FT .482   SYNTHES-STRATEC  N/A 1       Plan  Bedrest with HOB to ~30 degrees for 2 days  ADAT  Pain control  Mechanical DVT ppx  Protected WB with walker on POD #3  Record drain output  Dressings in place until POD #7      Rudolph Mukherjee MD  Orthopedic Surgery, PGY-4  Pager: [phone]  5:13 PM  January 5, 2020

## 2020-01-05 NOTE — SIGNIFICANT EVENT
SPIRITUAL HEALTH SERVICES Significant Event  [facility] of ANOINTING  [facility] ([place]) 7B    PATIENT ANOINTED and with the nurses OK gave pt tinny speck of Communion by Father Eliu Kelin 1/5/2020 per request of pt with family present.    Eliu Neville M.Div.     Pager [phone]

## 2020-01-05 NOTE — ED TRIAGE NOTES
Carmela comes to the ED today for evaluation of groin numbness after experiencing a ground level fall 12/14.  On Thursday she began to experience anal numbness and today difficulty controlling bladder and reporting vaginal numbness.

## 2020-01-05 NOTE — ANESTHESIA PREPROCEDURE EVALUATION
Anesthesia Pre-Procedure Evaluation    Patient: Jyoti De Jesus   MRN:     [id number] Gender:   female   Age:    89 year old :      1928        Preoperative Diagnosis: * No pre-op diagnosis entered *   Procedure(s):  OPEN REDUCTION INTERNAL FIXATION, FRACTURE, PELVIS     History reviewed. No pertinent past medical history.   History reviewed. No pertinent surgical history.       Anesthesia Evaluation     .             ROS/MED HX    ENT/Pulmonary:       Neurologic: Comment: Sacral ala fractures, bilateral transverse process fractures, saddle anesthesia      Cardiovascular:         METS/Exercise Tolerance:     Hematologic:         Musculoskeletal:         GI/Hepatic:         Renal/Genitourinary:         Endo:     (+) thyroid problem hypothyroidism, .      Psychiatric:         Infectious Disease:         Malignancy:         Other:                         PHYSICAL EXAM:   Mental Status/Neuro: A/A/O   Airway: Facies: Feasible  Mallampati: II  Mouth/Opening: Full   Respiratory: Auscultation: CTAB      CV: Rhythm: Regular   Comments:                      LABS:  CBC:   Lab Results   Component Value Date    WBC 9.3 2020    HGB 12.0 2020    HCT 35.7 2020     2020     BMP:   Lab Results   Component Value Date     2020    POTASSIUM 4.1 2020    CHLORIDE 101 2020    CO2 27 2020    BUN 19 2020    CR 0.92 2020    GLC 95 2020     COAGS:   Lab Results   Component Value Date    INR 1.06 2020     POC: No results found for: BGM, HCG, HCGS  OTHER:   Lab Results   Component Value Date    SAMMIE 9.2 2020    ALBUMIN 3.5 2020    PROTTOTAL 7.0 2020    ALT 21 2020    AST 18 2020    ALKPHOS 118 2020    BILITOTAL 0.5 2020        Preop Vitals    BP Readings from Last 3 Encounters:   20 120/67    Pulse Readings from Last 3 Encounters:   20 82      Resp Readings from Last 3 Encounters:   20 16  "   SpO2 Readings from Last 3 Encounters:   01/05/20 98%      Temp Readings from Last 1 Encounters:   01/05/20 36.6  C (97.8  F) (Oral)    Ht Readings from Last 1 Encounters:   01/04/20 1.594 m (5' 2.75\")      Wt Readings from Last 1 Encounters:   01/04/20 57.6 kg (127 lb)    Estimated body mass index is 22.68 kg/m  as calculated from the following:    Height as of this encounter: 1.594 m (5' 2.75\").    Weight as of this encounter: 57.6 kg (127 lb).     LDA:  Peripheral IV 01/04/20 Right Upper forearm (Active)   Site Assessment WDL 1/5/2020  3:00 AM   Line Status Infusing 1/5/2020  3:00 AM   Phlebitis Scale 0-->no symptoms 1/5/2020  3:00 AM   Infiltration Scale 0 1/5/2020  3:00 AM   Extravasation? No 1/5/2020  3:00 AM   Number of days: 1       Urethral Catheter (Active)   Tube Description Positional 1/5/2020  8:25 AM   Collection Container Standard 1/5/2020  8:25 AM   Securement Method Securing device (Describe) 1/5/2020  8:25 AM   Rationale for Continued Use Strict 1-2 Hour I&O 1/5/2020  8:25 AM   Urine Output 600 mL 1/5/2020  9:00 AM   Number of days: 0        Assessment:   ASA SCORE: 2    H&P: History and physical reviewed and following examination; no interval change.   Smoking Status:  Non-Smoker/Unknown   NPO Status: NPO Appropriate     Plan:   Anes. Type:  General   Pre-Medication: None   Induction:  IV (Standard)   Airway: ETT; Oral   Access/Monitoring: PIV; 2nd PIV   Maintenance: Balanced     Postop Plan:   Postop Pain: Opioids  Postop Sedation/Airway: Not planned  Disposition: Inpatient/Admit     PONV Management:   Adult Risk Factors: Female, Non-Smoker, Postop Opioids   Prevention: Ondansetron, Dexamethasone     CONSENT: Direct conversation   Plan and risks discussed with: Patient; Spouse                      Antonino Stiles MD  "

## 2020-01-05 NOTE — PROGRESS NOTES
SPIRITUAL HEALTH SERVICES  SPIRITUAL ASSESSMENT Progress Note  [facility] ([place]) 7B   ON-CALL VISIT    REFERRAL SOURCE: Epic consult    Pt. Jyoti identifies as Yazidi and has requested the sacrament of anointing from the  before her surgery this morning.      PLAN: I have consulted   to arron Jyoti. Spiritual Health is available to Jyoti per request.     Dann Dubois  Chaplain Resident  Pager [phone]

## 2020-01-05 NOTE — PROGRESS NOTES
Neurosurgery brief note:     I was called regarding a discrepant examination by the consulting orthopedics resident.     At time of my initial consultation, the patient was as follows:   Awake and alert  Cranial nerves intact  5/5 in bilateral upper and lower extremities  Sensation was intact in the distal hands and legs to light touch and pinprick  Light touch sensation and pinprick was intact in the duane-anal area  Rectal tone was present and able to increase with valsalva  Absent clonus bilaterally; absent valerio's bilaterally   Absent hyper-reflexia at the bilateral patellar tendons and biceps tendons    A repeat focused examination was performed due to communication of concern regarding sacral nerve root injury from her fractures found on CT pelvis. On this examination, the patient had light touch sensation in the S1 - S5 area consistently. Pinprick sensation was present as well but the patient had areas of minor patchy absent sensation. Thereafter, I was notified by the orthopedics resident on call regarding concern for cauda equina syndrome on his examination.     The concerns were then communicated to chief resident Dr. Castillo and staff Dr. Taylor. At this time, with the available diagnostic imaging (outside MRI L spine and CT pelvis from 1/4/2020), no focal lesion is noted as a responsible entity for the patient's symptoms. History was also clarified with the patient and her family members. Symptoms acutely began on Jan 2, 2020. Urinary incontinence appears to occur with being upright. Bowel incontinence was described as essentially a isolated episode that has not recurred.     At this time, our recommendation is to add on a MRI lumbar spine, non-contrasted study, to the patient's present pending imaging. No surgical intervention is planned at this time due to risk outweighing benefit without a clear structural lesion for surgical intervention. Moreover, the patient should be made flat bedrest at this time  "due to the history collected as above.     Plan was discussed with Dr. Castillo and Dr. Taylor.     Burt \"Armando\" MD [place]   Neurosurgery, PGY-3    Please contact neurosurgery resident on call with questions.    Dial * * *[phone], enter [phone] when prompted.             "

## 2020-01-05 NOTE — ED PROVIDER NOTES
History     Chief Complaint   Patient presents with     Numbness     HPI  Jyoti De Jesus is a 89 year old female who presents to the Emergency Department today for evaluation of saddle numbness.  The patient and family provide history for the patient.  They report that the patient was referred to our hospital to see Dr. Coffey for saddle numbness and incontinence the patient has progressively developed after a fall on December 14.  Patient reports that on December 14 she was walking through her garage to get the newspaper on the driveway.  She reports she was walking back into her house when she slipped on the garage floor and fell onto her left side.  She denies hitting her head.  The patient reports that following the fall she had left hip and thigh pain but nothing significant.  She reports that she was able to continue to walk following this.  However, a week after the fall she required the use of a cane and a few days later was then using a walker.  Per family, the patient had an x-ray on her left hip and left femur performed on December 26 which was normal.  The patient reports that 2 days ago she then developed saddle numbness and has numbness while wiping after passing stool and passing urine.  The patient then had an MRI done yesterday that showed a stress fracture in the sacrum per the family.  They report that they were then referred to our hospital to be seen by Dr. Coffey in neurosurgery for further management.    I have reviewed the Medications, Allergies, Past Medical and Surgical History, and Social History in the Epic system.    History reviewed. No pertinent past medical history.    History reviewed. No pertinent surgical history.    History reviewed. No pertinent family history.    Social History     Tobacco Use     Smoking status: Former Smoker     Smokeless tobacco: Never Used   Substance Use Topics     Alcohol use: Yes     Comment: daily     Current Facility-Administered  "Medications   Medication     lactated ringers infusion     oxyCODONE (ROXICODONE) tablet 5 mg     Current Outpatient Medications   Medication     aspirin 81 MG EC tablet     brimonidine (ALPHAGAN-P) 0.15 % ophthalmic solution     calcium acetate (PHOSLO) 667 MG CAPS capsule     cholecalciferol (VITAMIN D3) 5000 units (125 mcg) capsule     dorzolamide-timolol (COSOPT) 2-0.5 % ophthalmic solution     levothyroxine (SYNTHROID/LEVOTHROID) 75 MCG tablet     multivitamin w/minerals (MULTI-VITAMIN) tablet      No Known Allergies     Review of Systems   Constitutional: Negative for fever.   Neurological: Positive for numbness.     Physical Exam   BP: (!) 151/84  Pulse: 79  Temp: 98  F (36.7  C)  Resp: 18  Height: 159.4 cm (5' 2.75\")  Weight: 57.6 kg (127 lb)  SpO2: 97 %    Physical Exam  Constitutional:       General: She is not in acute distress.     Appearance: She is not diaphoretic.   HENT:      Head: Atraumatic.      Mouth/Throat:      Pharynx: No oropharyngeal exudate.   Eyes:      General: No scleral icterus.     Pupils: Pupils are equal, round, and reactive to light.   Neck:      Musculoskeletal: Neck supple.   Cardiovascular:      Rate and Rhythm: Normal rate and regular rhythm.      Heart sounds: Normal heart sounds. No murmur. No friction rub. No gallop.    Pulmonary:      Effort: Pulmonary effort is normal. No respiratory distress.      Breath sounds: Normal breath sounds. No stridor. No wheezing, rhonchi or rales.   Chest:      Chest wall: No tenderness.   Abdominal:      General: Abdomen is flat. Bowel sounds are normal. There is no distension.      Palpations: Abdomen is soft. There is no mass.      Tenderness: There is no abdominal tenderness. There is no right CVA tenderness, left CVA tenderness, guarding or rebound.      Hernia: No hernia is present.   Genitourinary:     Rectum: No mass, tenderness, anal fissure, external hemorrhoid or internal hemorrhoid. Abnormal anal tone (?loose). "       Musculoskeletal:         General: No tenderness.   Skin:     General: Skin is warm.      Findings: No rash.         ED Course   6:20 PM  The patient was seen and examined by Dr. Ellington in Room 22.       Procedures             Critical Care time:  was 30 minutes for this patient excluding procedures.  Results for orders placed or performed during the hospital encounter of 01/04/20 (from the past 24 hour(s))   CT Pelvis Bone wo Contrast     Status: None    Collection Time: 01/04/20  9:09 PM    Narrative    EXAM: CT PELVIS BONE WO CONTRAST  LOCATION: [facility]  DATE/TIME: 1/4/2020 9:05 PM    INDICATION: Fall, possible sacrum fracture.  COMPARISON: None.  TECHNIQUE: CT scan of the pelvis was performed without IV contrast. Multiplanar reformats were obtained. Dose reduction techniques were used.  CONTRAST: None.    FINDINGS:  Acute comminuted bilateral sacral alar fractures without significant displacement. The fractures involve the anterior cortex at the level of S1-S2, with extension to the bilateral sacroiliac joints and S1 and S2 neural foramen. Age-indeterminate fracture   at the S2-S3 junction with acute angulation on lateral view.    Acute bilateral L5 transverse process fractures. Age-indeterminate L5 superior endplate compression fracture which involves less than 50% of the vertebral body height. Mild degenerative changes of the lumbar spine.    Normal joint alignment maintained. There is no sacroiliac joint or pubic symphysis widening. Mild bilateral hip and sacroiliac joint degenerative changes. Diffuse osteopenia.    No hematoma. Colonic diverticulosis. Small bowel containing right inguinal hernia. No evidence of bowel obstruction. Atherosclerosis.      Impression    IMPRESSION:  1.  Acute comminuted bilateral sacral alar fractures.  2.  Acute bilateral L5 transverse process fractures.   3.  Age-indeterminate fracture at the S2-S3 junction.  4.  Age-indeterminate L5 vertebral body  compression fracture.  5.  Other ancillary findings as described above.     CBC with platelets differential     Status: Abnormal    Collection Time: 01/04/20 10:00 PM   Result Value Ref Range    WBC 9.3 4.0 - 11.0 10e9/L    RBC Count 3.41 (L) 3.8 - 5.2 10e12/L    Hemoglobin 12.0 11.7 - 15.7 g/dL    Hematocrit 35.7 35.0 - 47.0 %     (H) 78 - 100 fl    MCH 35.2 (H) 26.5 - 33.0 pg    MCHC 33.6 31.5 - 36.5 g/dL    RDW 12.6 10.0 - 15.0 %    Platelet Count 210 150 - 450 10e9/L    Diff Method Automated Method     % Neutrophils 67.7 %    % Lymphocytes 17.2 %    % Monocytes 10.6 %    % Eosinophils 2.3 %    % Basophils 1.0 %    % Immature Granulocytes 1.2 %    Nucleated RBCs 0 0 /100    Absolute Neutrophil 6.3 1.6 - 8.3 10e9/L    Absolute Lymphocytes 1.6 0.8 - 5.3 10e9/L    Absolute Monocytes 1.0 0.0 - 1.3 10e9/L    Absolute Eosinophils 0.2 0.0 - 0.7 10e9/L    Absolute Basophils 0.1 0.0 - 0.2 10e9/L    Abs Immature Granulocytes 0.1 0 - 0.4 10e9/L    Absolute Nucleated RBC 0.0    ABO/Rh type and screen     Status: None (In process)    Collection Time: 01/04/20 10:00 PM   Result Value Ref Range    ABO PENDING     Antibody Screen PENDING     Test Valid Only At          [facility]    Specimen Expires 01/07/2020    INR     Status: None    Collection Time: 01/04/20 10:00 PM   Result Value Ref Range    INR 1.06 0.86 - 1.14   Comprehensive metabolic panel     Status: Abnormal    Collection Time: 01/04/20 10:00 PM   Result Value Ref Range    Sodium 133 133 - 144 mmol/L    Potassium 4.1 3.4 - 5.3 mmol/L    Chloride 101 94 - 109 mmol/L    Carbon Dioxide 27 20 - 32 mmol/L    Anion Gap 5 3 - 14 mmol/L    Glucose 95 70 - 99 mg/dL    Urea Nitrogen 19 7 - 30 mg/dL    Creatinine 0.92 0.52 - 1.04 mg/dL    GFR Estimate 55 (L) >60 mL/min/[1.73_m2]    GFR Estimate If Black 63 >60 mL/min/[1.73_m2]    Calcium 9.2 8.5 - 10.1 mg/dL    Bilirubin Total 0.5 0.2 - 1.3 mg/dL    Albumin 3.5 3.4 - 5.0  g/dL    Protein Total 7.0 6.8 - 8.8 g/dL    Alkaline Phosphatase 118 40 - 150 U/L    ALT 21 0 - 50 U/L    AST 18 0 - 45 U/L           Labs Ordered and Resulted from Time of ED Arrival Up to the Time of Departure from the ED   CBC WITH PLATELETS DIFFERENTIAL - Abnormal; Notable for the following components:       Result Value    RBC Count 3.41 (*)      (*)     MCH 35.2 (*)     All other components within normal limits   COMPREHENSIVE METABOLIC PANEL - Abnormal; Notable for the following components:    GFR Estimate 55 (*)     All other components within normal limits   INR   UA MACROSCOPIC WITH REFLEX TO MICRO AND CULTURE   ABO/RH TYPE AND SCREEN            Assessments & Plan (with Medical Decision Making)  Mechanical fall about two weeks ago with pelvic pain-require cane to ambulate and last few days with urinary incontinence and saddle anesthesia, Work up per her PMD-MRI which showed sacral fx, D/W Dr Bronson Neurosurg at [facility]-emergent referal ?concern for cauda equina syndrome, Neurosurg consult done-MRI L spine without suggestion of cauda equina syndrome, CT pelvis with sacral fx-Ortho consult in progress, MRI T spine also ordered per Neurosurg, Trauma consult done-admit.       I have reviewed the nursing notes.    I have reviewed the findings, diagnosis, plan and need for follow up with the patient.  New Prescriptions    No medications on file     Final diagnoses:   Saddle anesthesia   Urinary incontinence, unspecified type   Closed fracture of sacrum, unspecified portion of sacrum, initial encounter (H)   Fall, initial encounter   INolberto, am serving as a trained medical scribe to document services personally performed by Kandace Ellington MD, based on the provider's statements to me.   Kandace HOLLEY MD, was physically present and have reviewed and verified the accuracy of this note documented by Nolberto Hughes.     1/4/2020   [facility], [place], EMERGENCY DEPARTMENT     Kandace Ellington,  MD  01/04/20 2315

## 2020-01-05 NOTE — PROGRESS NOTES
Ortho Spine Attending    I was called this morning about Carmela De Jesus, 89 yo WF with a pelvis insufficiency fracture and a question of a cauda equina syndrome based on history of new b/b incontinence. Fell 2 weeks ago (low energy) and then developed worsening symptoms. Presented to the ER yesterday and was evaluated and imaged. Dr. Nehemias Miller the ortho resident on call spoke to me at 0645 this morning at the request of Dr. Charanjit Lew, the ortho trauma call staff.    I reviewed her imaging and she has a sacral insufficiency fracture, probably H type. She has significant sagittal anguulation at S2-3. Not comment on by the reading radiologist is a dorsal fluid accumulation adjacent to the thecal sac. Initially I thought this was a Tarlov cyst on the sagiittal MRI but it looks more like a CSF accumulation probably from a bony puncture of the dura.    I assessed her opportunistic BMD on the pelvis CT scan and at L4 it is about 70 HU with normal being 135.    The physical exam has been performed in detail by several residents and Dr. Rey Taylor from Neurosurgery. I did not repeat this.    I came in and spoke with the patient, her , daughter, son and granddaughter. I drew pictures to illustrate the fracture pattern in a frontal and sagittal plane. I explained the options of non-operative and operative treatment and the risks and benefits associated with each.    The family talked with Carmela and they came to the decision that tanvir hayes wanted to proceed with surgery.    The surgery will involve iliosacral screw fixation and spinopelvic fixation with probable cement augmentation. We will do this with image guidance. There is a possibility that there is a dural tear and Neurosurgery will collaborate with us and do a repair and possible diversion (lumbar drain etc) as needed.  (Dr. Taylor and team).    I have coordinate with the [facility] OR team, spoken with Dr. Antonino Stiles the anesthesiologist on call, and he  is prepared to do this case.    I have also had administrative discussions with Dr. Julito Richey in his role as director of surgical services. We are in agreement at this time that it is reasonable to do this case on the [facility].    I have reviewed and concur with Dr. Miller's consult note. There is debate about the cauda equina syndrome diagnosis. I suspect that there is a dynamic instability component to this when the patient is weight bearing that results in motion of the fracture exacerbating the symptoms. These do not appear to be as evident when she is lying supine.    My total contact time in counseling about and coordinating care has been >3 hours.    Wai Mason MD

## 2020-01-05 NOTE — PROGRESS NOTES
"Consult [id number]    Burt \"Armando\" MD [place]   Neurosurgery, PGY-3    Please contact neurosurgery resident on call with questions.    Dial * * [phone], enter [phone] when prompted.     "

## 2020-01-05 NOTE — PROGRESS NOTES
"Neurosurgery progress note:     S: no acute overnight events since being admitted and on flat bedrest. Continued aching     O:  Temp:  [97.8  F (36.6  C)-98.5  F (36.9  C)] 97.8  F (36.6  C)  Pulse:  [64-88] 82  Resp:  [16-18] 16  BP: (120-151)/(60-84) 120/67  SpO2:  [96 %-98 %] 98 %    Awake and alert  Cranial nerves II-XII are intact.    Oriented x 3.    5/5 strength in bilateral upper extremities.    Sensation is intact in the bilateral upper extremities.    5/5 strength in bilateral lower extremities.    Sensation is intact in all lower extremities in the L4 to S5 distribution to light touch and pinprick.   Rectal tone is present     Above examination was repeated by Dr. Taylor and Dr. Castillo on 1/5/20 with the patient in the recumbent position.     The patient's labs and imaging have been personally reviewed.     Assessment:   1. Urinary and bowel incontinence concerning for dynamic sacral nerve root compression from bilateral sacral fractures with suspicion for associated dural tear. There is suspicion that symptomatolgy is position dependent.   2. L5 transverse process fractures    Recommendations:   - Discussion of risks and benefits of bedrest versus combined surgical open reduction and internal fixation of sacral fractures with plan for dural repair and lumbar drain was discussed with the patient and family in extensive detail. At this time, surgical options have been presented to the patient and family, and they will state their preferred option   - trauma tertiary examination  - No treatment for lumbar transverse process fractures  - continue bedrest with log rolls for now  - would continue NPO status and holding DVT chemoprophylaxis until cleared by orthopedic surgery that surgical fixation not occurring on 1/5/20     Plan discussed with Dr. Taylor and Dr. Castillo who agree with the above.     Burt \"Armando\" MD [place]   Neurosurgery, PGY-3    Please contact neurosurgery resident on call with questions.    Dial * * " [phone], enter [phone] when prompted.       Neuosurgery Faculty Note    Patient seen and examined with Irais Castillo and . I had repeated the neurologic examination in person. I had also met with the family in person to review my recommendations.     In brief, this is a 89 year old woman who initially presented s/p fall from standing on 12/14/2019. The patient and her family reports that the patient was cleared for ambulation and was encouraged to do so. She was back to her baseline activity with complaint of pain on ambulation that persisted. Subsequently, her neurologic status worsened, such that she required a cane for ambulation. Approximately a week prior, she needed a walker for continued ambulation.  Approximately three days prior to presentation, the patient noted that she was not able to feel her perineum when she wipes herself after urination. The symptom occur only during urination or ambulation. She denied saddle anesthesia otherwise.    She presents to our emergency room for work-up of neurologic decline after fall from standing. Work-up included a post-void residual of 20 ml's. My examination of the patient is that the strength are full throughout. Sensation is intact to LT and pin-prick, including detailed examination of the perineal and duane-anal region. Rectal tone is present. There is no evidence of stool staining on the bedsheet.     CT and MRI of the lumbo-sacral spine was performed. CT demonstrated bilateral sacral alar fractures that extended to the neuro-foramina of S1 and S2. There is no significant displacement. MRI reviewed a CSF collection adjacent to the left S2-3 nerve root. Differential of this collection include dura tear or perineural cyst.     AP: 89 year old with bilateral sacral fracture. Her neurologic decline secondary to ambulation likely suggest sacral insufficiency. Her current examination is reassuring as she has full strength in her bilateral extremities, an intact rectal tone and  her post-void residual of 20 ml's. Detailed examination of her perineum and duane-anal region show no evidence of saddle anesthesia.  Given the dynamic nature of her complaint, I suspect that the neurologic symptoms is associated with sacral instability. Her options are internal fixation (sacral iliac screws +/- spino-pelvic fixation) versus bed rest.  Surgical intervention in a 89 year old warrant careful consideration, especially given findings of potential CSF leak. The dura in the elderly population tend to be tenuous and water-tight repair may not be possible.     I have reviewed the

## 2020-01-05 NOTE — CONSULTS
Orthopaedic Surgery Consultation: 01/05/2020    Jyoti De Jesus MRN# [id number]   Age: 89 year old YOB: 1928   Date of Admission:  1/4/2020    Reason for consult: Sacral fracture   Requesting physician: No att. providers found              Impression and Recommendation (Resident / Clinician):   Impression:  Jyoti De Jesus is a 89 year old female with a displaced type H sacral fracture after a ground-level fall on December 14, with associated perianal sensory disturbance, and bowel and bladder dysfunction, which in the absence of other pathology is concerning for a cauda equina presentation.  Symptoms have been present since December 26 and worsening.    Recomendations:  -MRI of the sacrum to evaluate for nerve root compression/hematoma formation.  -Bed rest for treatment of the sacral fracture and avoid further displacement.  -Further treatment recommendations to follow pending evolution of exam and MRI findings.  -Discussed with the primary team that orthopedic surgery does not have a spine surgeon on call this weekend.  -Regarding the treatment of the sacral fracture, the patient might require stabilization with a trans-sacral screws at a later time.      The patient was discussed with Dr. Charanjit Lew            History of Present Illness:   Patient is a 89-year-old female seen in consultation for a sacral fracture.  She reports that she sustained a ground-level fall landing on her left hip on December 14 of 2019.  At that time she was able to stand up and continue to ambulate for the next week without much difficulty.  She did not sustain any other injuries with this fall.  However, on December 26 she noted worsening of her low back, buttock, and posterior thigh pain.  Over the next couple of days the symptoms worsened and in addition to these she also developed bowel and bladder incontinence, as well as decreased sensation around her genitals.  Given the severity of her  "bowel and bladder incontinence, her daughter decided to buy her diapers on January 2 of 2020.  The patient denies ever having had to use diapers before.  Given the low back pain and difficulty with ambulation, the patient started using a walker for ambulation around December 28.  The patient saw her primary care physician, which referred her to the [facility] for further evaluation and management.       History obtained from patient interview and chart review.        Past Medical History:   Hypothyroidism and osteoporosis         Past Surgical History:   History reviewed. No pertinent surgical history.         Social History:   Non-smoker, community ambulator unassisted, lives with her .  Has good family support.          Family History:   No family history of anesthesia, bleeding or clotting complications.           Allergies:   No Known Allergies          Medications:   Anticoagulation: None  Antibiotics: None          Review of Systems:   A focused review of systems was performed and found to be negative except as stated in the HPI.          Physical Exam:   BP (!) 150/82   Pulse 88   Temp 98  F (36.7  C) (Oral)   Resp 16   Ht 1.594 m (5' 2.75\")   Wt 57.6 kg (127 lb)   SpO2 98%   Breastfeeding No   BMI 22.68 kg/m    General: awake, alert, cooperative, no apparent distress, appears stated age  HEENT: normocephalic, atraumatic, EOMI, no scleral icterus  Respiratory: breathing non-labored, no wheezing  Cardiovascular: nontachycardic  Skin: no rashes or lesions  Neurological: A&Ox3, CN II-XII grossly intact  Musculoskeletal: The patient is noted to have decreased sensation along the perianal area, particularly at S3 and more distal roots.  She has poor rectal tone, there is significant evidence of fecal matter in the rectal vault, which the patient is unable to feel.      Lower extremities:  Motor Strength Right Left   Hip flexion: L1, L2, L3 4/5 4/5   Hip adduction: L2, L3 4/5 4/5   Knee " flexion: S1 5/5 4/5   Knee extension: L3, L4 5/5 5/5   Ankle dosiflexion: L4, L5 5/5 5/5   EHL: L5 4/5 5/5   Ankle plantarflexion: S1 5/5 4/5     Sensation from L1-S2 is preserved.    Reflexes Right Left   Patellar 1+ 1+   Achilles 1+ 1+   Clonus no no             Imaging:   CT scan of her lumbar spine and sacrum obtained today was reviewed.  It shows a comminuted sacral fracture involving both sacral ala, the fracture extends distally along the foramina and transversely at the S3 level.  This is most consistent with an H-type sacral fracture.  The transverse component of the sacral fracture is at the level of S3 with flexion of the fragments to about 80 degrees         Laboratory data:     Inflammatory Markers:  Lab Results   Component Value Date    WBC 9.3 01/04/2020       CBC:  Lab Results   Component Value Date    WBC 9.3 01/04/2020    HGB 12.0 01/04/2020     01/04/2020       BMP:  Lab Results   Component Value Date     01/04/2020    POTASSIUM 4.1 01/04/2020    CHLORIDE 101 01/04/2020    CO2 27 01/04/2020    BUN 19 01/04/2020    CR 0.92 01/04/2020    ANIONGAP 5 01/04/2020    SAMMIE 9.2 01/04/2020    GLC 95 01/04/2020           Nehemias Miller MD  Orthopaedic Surgery, PGY-4  Pager: [phone]

## 2020-01-05 NOTE — H&P
[facility]    History and Physical / Consult note: Trauma Service     Date of Admission:  1/4/2020    Time of Admission/Consult Request (page/call): 2111    Time of my evaluation: 2140  Consulting services:  Orthopedics - Non-emergent consult: Called by ED  Neurosurgery - Non-emergent consult: Called by ED    Assessment & Plan   Trauma mechanism:Ground level fall  Time/date of injury:12/14  Known Injuries:  1. Sacral Ala fractures  2. B/l L5 TP fractures  3. Age indeterminate- likely old L5 compression fracture  Other diagnoses:   1. Hypothyroidism        Procedure:  none  Plan:  1. Admit to trauma  2. Neurosurgery consult for symptoms of loss of bladder control and saddle anesthesia  3. Orthopedic consult for Sacral Ala fractures  4. UA/UC pending  5. MRI T-spine for completion spine imaging  6. Keep NPO pending evaluation for cauda equina  7. PT/OT  .     General Cares:  GI Prophylaxis: no  DVT Prophylaxis: mechanical  Date of last stool/Bowel Regimen:1/3  Pulmonary toilet:no    ETOH: This patient was asked if in the last 3-6 months there has been a time when she had 4 or more drinks in a single day/outing.. Patient answer to the screening question was in the negative. No intervention needed.  Primary Care Physician   Jet Matt    Chief Complaint   Low back pain    History is obtained from the patient    History of Present Illness   Jyoti De Jesus is a 89 year old female who presents with back pain, saddle anesthesia, and urinary incontinence over the past 3 days. She originally had a ground level fall on December 14th on the garage floor after slipping on ice. She denies hitting her head. She had immediate low back/hip pain. She had an xray of her left hip and femur that was unremarkable on 12/26. She has been requiring assistance from a walker to ambulate with progressively worsening low back pain. 3 days ago she noticed she developed saddle anesthesia after  wiping. She reports some urinary incontinence and has been using adult diapers since. She also reports one episode of bowel incontinence but believes that was due to stool softeners.     Past Medical History    I have reviewed this patient's medical history and updated it with pertinent information if needed.   History reviewed. No pertinent past medical history.     Hypothyroidism  Glaucoma    Past Surgical History   Past surgical history reviewed with no previous surgeries identified.  Prior to Admission Medications   Prior to Admission Medications   Prescriptions Last Dose Informant Patient Reported? Taking?   aspirin 81 MG EC tablet Past Week at Unknown time  Yes Yes   Sig: Take 81 mg by mouth daily   brimonidine (ALPHAGAN-P) 0.15 % ophthalmic solution   Yes Yes   Sig: Place 2 drops into both eyes 2 times daily   calcium acetate (PHOSLO) 667 MG CAPS capsule   Yes Yes   Sig: Take 500 mg by mouth daily   cholecalciferol (VITAMIN D3) 5000 units (125 mcg) capsule   Yes Yes   Sig: Take 5,000 Units by mouth daily   dorzolamide-timolol (COSOPT) 2-0.5 % ophthalmic solution   Yes Yes   Sig: Place 2 drops into the right eye 2 times daily   levothyroxine (SYNTHROID/LEVOTHROID) 75 MCG tablet   Yes Yes   Sig: Take 75 mcg by mouth daily   multivitamin w/minerals (MULTI-VITAMIN) tablet   Yes Yes   Sig: Take 1 tablet by mouth daily      Facility-Administered Medications: None     Allergies   No Known Allergies    Social History   Social History     Socioeconomic History     Marital status:      Spouse name: Not on file     Number of children: Not on file     Years of education: Not on file     Highest education level: Not on file   Occupational History     Not on file   Social Needs     Financial resource strain: Not on file     Food insecurity:     Worry: Not on file     Inability: Not on file     Transportation needs:     Medical: Not on file     Non-medical: Not on file   Tobacco Use     Smoking status: Former Smoker      Smokeless tobacco: Never Used   Substance and Sexual Activity     Alcohol use: Yes     Comment: daily     Drug use: Not Currently     Sexual activity: Not on file   Lifestyle     Physical activity:     Days per week: Not on file     Minutes per session: Not on file     Stress: Not on file   Relationships     Social connections:     Talks on phone: Not on file     Gets together: Not on file     Attends Yazidism service: Not on file     Active member of club or organization: Not on file     Attends meetings of clubs or organizations: Not on file     Relationship status: Not on file     Intimate partner violence:     Fear of current or ex partner: Not on file     Emotionally abused: Not on file     Physically abused: Not on file     Forced sexual activity: Not on file   Other Topics Concern     Not on file   Social History Narrative     Not on file       Family History   I have reviewed this patient's family history and updated it with pertinent information if needed.   History reviewed. No pertinent family history.    Review of Systems   CONSTITUTIONAL: No fever, chills, sweats, fatigue   EYES: no visual blurring, no double vision or visual loss  ENT: no decrease in hearing, no tinnitus, no vertigo, no hoarseness  RESPIRATORY: no shortness of breath, no cough, no sputum   CARDIOVASCULAR: no palpitations, no chest  pain, no exertional chest pain or pressure  GASTROINTESTINAL: no nausea or vomiting, or abd pain  GENITOURINARY: no dysuria, no frequency or hesitancy, no hematuria  MUSCULOSKELETAL: no weakness, no redness, no swelling, no joint pain,   SKIN: no rashes, ecchymoses, abrasions or lacerations  NEUROLOGIC: no numbness or tingling of hands, no numbness or tingling  of feet, no syncope, no tremors or weakness  PSYCHIATRIC: no sleep disturbances, no anxiety or depression    Physical Exam   Temp: 98  F (36.7  C) Temp src: Oral BP: (!) 151/84 Pulse: 79   Resp: 18 SpO2: 97 % O2 Device: None (Room air)    Vital  Signs with Ranges  Temp:  [98  F (36.7  C)] 98  F (36.7  C)  Pulse:  [79] 79  Resp:  [18] 18  BP: (151)/(84) 151/84  SpO2:  [97 %] 97 % 127 lbs 0 oz    Primary Survey:  Airway: patient talking  Breathing: symmetric respiratory effort bilaterally  Circulation: central pulses present and peripheral pulses present  Disability: Pupils - left 4 mm and brisk, right 4 mm and brisk     Herson Coma Scale - Total 15/15  Eye Response (E): 4  4= spontaneous,  3= to verbal/voice, 2=  to pain, 1= No response   Verbal Response (V): 5   5= Orientated, converses,  4= Confused, converses, 3= Inappropriate words,  2= Incomprehensible sounds,  1=No response   Motor Response (M): 6   6= Obeys commands, 5= Localizes to pain, 4= Withdrawal to pain, 3=Fexion to pain, 2= Extension to pain, 1= No response    Secondary Survey:  General: alert, oriented to person, place, time  Neuro: PERRLA. EOMI. CN II-XII grossly intact. No focal deficits. Strength 5/5 x 4 extremities.  Sensation intact.  Head: atraumatic, normocephalic, trachea midline  Eyes:  Pupils 4mm, EOMI, corneas and conjunctivae clear  Neck:  no cervical collar present. No midline posterior tenderness, full AROM without pain.   Chest/Pulmonary: normal respiratory rate and rhythm,  bilateral clear breath sounds, no wheezes, rales or rhonchi, no chest wall tenderness or deformities,   Cardiovascular: S1, S2,  normal and regular rate and rhythm, no murmurs  Abdomen: soft, non-tender, no guarding, no rebound tenderness and no tenderness to palpation  : normal external genitalia, pelvis stable to lateral compression,  no last. Diminished sensation. Rectal tone intact.  Musculoskel/Extremities: normal extremities, full AROM of major joints without tenderness, edema, erythema, ecchymosis, or abrasions. palpable PP. no edema.   Back/Spine: no deformity, no midline tenderness, no sacral tenderness, no step-offs and no abrasions or contusions  Hands: no gross deformities of hands or  fingers. Full AROM of hand and fingers in flexion and extension.  strength equal and symmetric.   Psychiatric: affect/mood normal, cooperative, normal judgement/insight and memory intact  Skin: no rashes, laceration, ecchymosis, skin warm and dry.     Results for orders placed or performed during the hospital encounter of 01/04/20 (from the past 24 hour(s))   CT Pelvis Bone wo Contrast    Narrative    EXAM: CT PELVIS BONE WO CONTRAST  LOCATION: [facility]  DATE/TIME: 1/4/2020 9:05 PM    INDICATION: Fall, possible sacrum fracture.  COMPARISON: None.  TECHNIQUE: CT scan of the pelvis was performed without IV contrast. Multiplanar reformats were obtained. Dose reduction techniques were used.  CONTRAST: None.    FINDINGS:  Acute comminuted bilateral sacral alar fractures without significant displacement. The fractures involve the anterior cortex at the level of S1-S2, with extension to the bilateral sacroiliac joints and S1 and S2 neural foramen. Age-indeterminate fracture   at the S2-S3 junction with acute angulation on lateral view.    Acute bilateral L5 transverse process fractures. Age-indeterminate L5 superior endplate compression fracture which involves less than 50% of the vertebral body height. Mild degenerative changes of the lumbar spine.    Normal joint alignment maintained. There is no sacroiliac joint or pubic symphysis widening. Mild bilateral hip and sacroiliac joint degenerative changes. Diffuse osteopenia.    No hematoma. Colonic diverticulosis. Small bowel containing right inguinal hernia. No evidence of bowel obstruction. Atherosclerosis.      Impression    IMPRESSION:  1.  Acute comminuted bilateral sacral alar fractures.  2.  Acute bilateral L5 transverse process fractures.   3.  Age-indeterminate fracture at the S2-S3 junction.  4.  Age-indeterminate L5 vertebral body compression fracture.  5.  Other ancillary findings as described above.     CBC with platelets differential    Result Value Ref Range    WBC 9.3 4.0 - 11.0 10e9/L    RBC Count 3.41 (L) 3.8 - 5.2 10e12/L    Hemoglobin 12.0 11.7 - 15.7 g/dL    Hematocrit 35.7 35.0 - 47.0 %     (H) 78 - 100 fl    MCH 35.2 (H) 26.5 - 33.0 pg    MCHC 33.6 31.5 - 36.5 g/dL    RDW 12.6 10.0 - 15.0 %    Platelet Count 210 150 - 450 10e9/L    Diff Method Automated Method     % Neutrophils 67.7 %    % Lymphocytes 17.2 %    % Monocytes 10.6 %    % Eosinophils 2.3 %    % Basophils 1.0 %    % Immature Granulocytes 1.2 %    Nucleated RBCs 0 0 /100    Absolute Neutrophil 6.3 1.6 - 8.3 10e9/L    Absolute Lymphocytes 1.6 0.8 - 5.3 10e9/L    Absolute Monocytes 1.0 0.0 - 1.3 10e9/L    Absolute Eosinophils 0.2 0.0 - 0.7 10e9/L    Absolute Basophils 0.1 0.0 - 0.2 10e9/L    Abs Immature Granulocytes 0.1 0 - 0.4 10e9/L    Absolute Nucleated RBC 0.0    ABO/Rh type and screen   Result Value Ref Range    ABO O     RH(D) Pos     Antibody Screen Neg     Test Valid Only At          [facility]    Specimen Expires 01/07/2020    INR   Result Value Ref Range    INR 1.06 0.86 - 1.14   Comprehensive metabolic panel   Result Value Ref Range    Sodium 133 133 - 144 mmol/L    Potassium 4.1 3.4 - 5.3 mmol/L    Chloride 101 94 - 109 mmol/L    Carbon Dioxide 27 20 - 32 mmol/L    Anion Gap 5 3 - 14 mmol/L    Glucose 95 70 - 99 mg/dL    Urea Nitrogen 19 7 - 30 mg/dL    Creatinine 0.92 0.52 - 1.04 mg/dL    GFR Estimate 55 (L) >60 mL/min/[1.73_m2]    GFR Estimate If Black 63 >60 mL/min/[1.73_m2]    Calcium 9.2 8.5 - 10.1 mg/dL    Bilirubin Total 0.5 0.2 - 1.3 mg/dL    Albumin 3.5 3.4 - 5.0 g/dL    Protein Total 7.0 6.8 - 8.8 g/dL    Alkaline Phosphatase 118 40 - 150 U/L    ALT 21 0 - 50 U/L    AST 18 0 - 45 U/L       Studies:  CT Pelvis Bone wo Contrast   Final Result   IMPRESSION:   1.  Acute comminuted bilateral sacral alar fractures.   2.  Acute bilateral L5 transverse process fractures.    3.  Age-indeterminate fracture at the  S2-S3 junction.   4.  Age-indeterminate L5 vertebral body compression fracture.   5.  Other ancillary findings as described above.         MR Thoracic Spine w/o Contrast    (Results Pending)       Benigno Serrano

## 2020-01-05 NOTE — CONSULTS
Neuosurgery Faculty Note    Patient seen and examined with Irais Castillo and . I had repeated the neurologic examination in person. I had also met with the family in person to review my recommendations.     In brief, this is a 89 year old woman who initially presented s/p fall from standing on 12/14/2019. The patient and her family reports that the patient was cleared for ambulation and was encouraged to do so. She was back to her baseline activity with complaint of pain on ambulation that persisted. Subsequently, her functional status worsened, such that she required a cane for ambulation several days after discharge. Approximately a week prior to presentation, she required a walker for continued ambulation due to worsening pain.  Her primary care physician prescribed narcotics for pain control at this time. Approximately three days prior to presentation, the patient noted that she was not able to feel her perineum when she wipes herself after urination. The symptom occur only during urination or ambulation. She denied saddle anesthesia otherwise.    She presents to our emergency room for work-up of her new symptoms. Work-up included a post-void residual of 20 ml's. My examination of the patient is that the strength are full throughout. Sensation is intact to LT and pin-prick, including detailed examination of the perineal and duane-anal region. Rectal tone is present. There is no evidence of stool staining on the bedsheet.     CT and MRI of the lumbo-sacral spine was performed. CT demonstrated bilateral sacral alar fractures that extended to the neuro-foramina of S1 and S2. There is no significant displacement. MRI reviewed a CSF collection adjacent to the left S2-3 nerve root. Differential of this collection include dura tear or perineural cyst. L5 transverse process fracture was also appreciated.    AP: 89 year old with bilateral sacral fracture. Her neurologic decline secondary to ambulation likely suggest sacral  insufficiency and potential dynamic instability. She has been kept on bedrest since presentation to our EW.    On bedrest, her current examination is reassuring as she has full strength in her bilateral extremities, an intact rectal tone and her post-void residual of 20 ml's. Detailed LT and pin-prick examination of her perineum and duane-anal region show no evidence of saddle anesthesia.  Given the dynamic nature of her complaint, I suspect that the neurologic symptoms is associated with sacral micro-instability (CT revealed that the fractures are non-displaced). Her treatment options are internal fixation (sacral iliac screws +/- spino-pelvic fixation) versus continued bed rest.      Surgical intervention in this patient warrants careful consideration, since the fragility of a 89 year old cannot be under-estimated. The dura in the elderly population tend to be tenuous and water-tight repair may not be possible if there is indeed a CSF leak. The fact that the patient suffered a major sacral fracture through a fall from standing suggests that her bone quality may not be optimal for internal fixation.  On the other hand, bedrest is associated with risks such as deep venous thrombosis. I believe that the patient's clinical course may be challenging with either option.    I have discussed this case with Dr. Wai Mason, who will be managing this case from an orthopedics perspective.  I also reviewed the risks and benefits of internal fixation versus bedrest with the patient and her family.  After meeting with me, the patient and her family met with Dr. Mason to review the risks and benefits from his perspective.    After discussion with both myself and Dr. Mason, the patient's family decided to pursue surgical intervention based on Dr. Mason's recommendation.  Dr. Mason will serve as the primary surgeon of this case and perform the necessary fixation(s). Since [place] [facility] [place] [facility] [place]  procedure, Dr. Mason's team has graciously agreed serve as the primary service for the patient.      My role in this surgery will be limited to the management of a CSF leak, should a leak be found at the time of surgery. If there is no evidence of CSF leak, there will be no need for my involvement.    Pertaining to the procedural consent, I have discussed with the family the potential need for a lumbar drain should there be a leak that cannot be repaired adequately. We additional discussed potential need for muscle flap CSF leak repair as well as ventriculoperitoneal shunting should there be a CSF leak that cannot be adequately repaired during this surgery. We also discussed the potential for nerve root injury during the repair.  Informed consent was obtained for my portion of the procedure.    The Neurosurgery Team will continue to follow the patient as a consult service if CSF leak repair is required.      I spent 90 minutes personally evaluating clinical and imaging findings & managing the care of this patient. Over 50% of my time on the unit was spent counseling the patient and her family in terms of the risk/benefit of surgical stabilization versus bedrest. Dr. Mason and I were both present when the final decision to proceed with the surgery was made by the family.

## 2020-01-05 NOTE — ANESTHESIA CARE TRANSFER NOTE
Patient: Jyoti De Jesus    Procedure(s):  OPEN REDUCTION INTERNAL FIXATION, FRACTURE, PELVIS, spinal instrumentation L4-S1, cement augmentation L4-L5, image guided surgery    Diagnosis: * No pre-op diagnosis entered *  Diagnosis Additional Information: No value filed.    Anesthesia Type:   General     Note:  Airway :Face Mask  Patient transferred to:PACU  Comments: T 97.6,  RR 12.  Handoff Report: Identifed the Patient, Identified the Reponsible Provider, Reviewed the pertinent medical history, Discussed the surgical course, Reviewed Intra-OP anesthesia mangement and issues during anesthesia, Set expectations for post-procedure period and Allowed opportunity for questions and acknowledgement of understanding      Vitals: (Last set prior to Anesthesia Care Transfer)    CRNA VITALS  1/5/2020 1640 - 1/5/2020 1717      1/5/2020             Resp Rate (observed):  (!) 1                Electronically Signed By: MARKIE Maloney CRNA  January 5, 2020  5:17 PM

## 2020-01-05 NOTE — OP NOTE
DATE OF SURGERY: 1/5/2020    PREOPERATIVE DIAGNOSIS: Sacral fracture    POSTOPERATIVE DIAGNOSIS: Sacral fracture    PROCEDURE: #1 internal fixation sacrum #2 instrumentation of L4 to the pelvis both with O arm guidance    SURGEON: Wai Mason MD.   Co-surgeon for procedure #1 Charanjit Lew MD     ASSISTANT: Rudolph Mukherjee MD resident this patient suffered a fall a little over 2 weeks ago.    PATIENT HISTORY: She has symptoms of saddle anesthesia and poor rectal tone but a low post void residual in the bladder.  She also has significant discomfort when upright.  In consultation with neurosurgery decision was made to proceed with fixation of the pelvis.  Ultimately it was decided intraoperatively not to do any nerve root decompression in the sacrum.  Dr. Mason performed the lumbar to pelvis fixation.  I was co-surgeon with him on the sacral fixation.  2 surgeons were needed as 1 held the navigated guides and the other the  for the K wires or the screw as the case may be.    DESCRIPTION OF PROCEDURE: The patient was placed prone after general anesthesia.  A midline incision was used to expose the lumbosacral junction.  Dr. Mason placed pedicle screws in L4 and L5 and secured them with cement.  He also eventually placed screws in S1 in the pelvis after the sacral fixation.  Once the lumbar screws were in place we used an O arm spin to plan trajectory of 2 screws in S1 and one screw and S2.  Starting from the left side guidewires were placed into S1 and then asked to by making an incision placing a guided cannula down to the bone and placing a K wire across the bone and our planned trajectory.  Separate incision was made for each screw.  I then placed a right to left S1 screw as well.  We did an O arm spin to examine the position of the guidewires.  We are happy with the position of the wires.  We first placed the screws from left to right in S1 and S2.  Fair purchase was obtained given the poor bone  quality.  The screw height was adjusted under fluoroscopic guidance.  The screw from the right to left was also placed in its position adjusted under fluoroscopy.  We then did another spin after removing the guidewires and determined that the screws were in good position.  Dr. Mason then proceeded with placement of his pelvic and S1 screws.  He used titanium rods to join these 4 screws on either side.  Dr. Mason irrigated and closed the wound.  The estimated blood loss is about 350 cc.  There were no complications.  I was present for all critical portions of the sacrum internal fixation.    Charanjit Lew MD

## 2020-01-05 NOTE — CONSULTS
Consult Date:  01/04/2020      NEUROSURGERY CONSULTATION      REASON FOR CONSULTATION:  Evaluation for cauda equina syndrome.      REQUESTING PHYSICIAN:  Dr. Ellington      HISTORY OF PRESENT ILLNESS:  Jyoti De Jesus is a 89-year-old female with a history of glaucoma and hypothyroidism, who presented to the Emergency Department at the [facility].      The patient's history begins back on 12/14/2019.  The patient developed a fall.  Thereafter, the patient developed saddle numbness and incontinence beginning about 3 days prior to today's ED presentation.  Her primary care doctor at [facility] initiated a simple workup beginning with a lumbar x-ray.  After lack of resolution of her pain, which is localized to her low back, the patient underwent a lumbar MRI.  The MRI was initiated after the patient noted difficulty with ambulation, progressing from a cane to a walker.  Due to concerning findings on that scan, the patient was recommended to proceed with a CT of the lumbar spine as well.  This imaging workup was completed at [facility], and only the MRI lumbar spine was available for our review.  The CT lumbar spine was not available for review.      The fall on 12/14 was stated to be a mechanical fall.  The patient did fall on her left side into the garage floor.  The patient denies any head trauma or loss of consciousness at that time.      After her fall, the patient began using a cane and subsequently progressed to using a walker.  She continues to be able to ambulate at this time of presentation.  However, the patient noticed that she has numbness and decreased sensation when wiping, especially surrounding her labia and her rectum.      The patient began using Depends beginning about 3 days ago, purchased by her daughter.  The patient has had only 1 episode of bowel incontinence.  The patient has not stooled since that time.  The patient, however, states that she is able to sense the upcoming episodes or  defecation and urination.  She, however, seems to be unable to control when these are occurring.       The patient was then referred to the [facility] by Dr. Coffey, neurosurgeon at [facility], for further management after the radiologic investigations were completed at [facility].      The patient states that her leg strength is at her baseline and that she has no focal limb weakness.  The patient denies any numbness or tingling in her lower extremities.  The patient does report numbness in her groin and perianal area as noted above.  She denies any upper extremity symptoms at this time.  The patient denies any neck pain.      PAST MEDICAL HISTORY:  Glaucoma and hypothyroidism.      PAST SURGICAL HISTORY:  None.      FAMILY HISTORY:  Reviewed and found to be noncontributory.      SOCIAL HISTORY:  The patient used to be a  of clothing.  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  She is .  She lives independently with her .      REVIEW OF SYSTEMS:  Ten-point review of systems was negative, unless as indicated in HPI.      PHYSICAL EXAMINATION:   GENERAL:  The patient is awake and alert, in no acute distress.   NEUROLOGIC:    Cranial nerves II-XII are intact.    Oriented x 3.    5/5 strength in bilateral upper extremities.    Sensation is intact in the bilateral upper extremities.    5/5 strength in bilateral lower extremities.    Sensation is intact in all lower extremities in the L4 to S1 distribution in the lower extremities.    Perianal sensation is intact to light touch and to pinprick.  Pinprick sensation is intact in distal lower extremities as well.    Rectal tone is present and increases with Valsalva appropriately.    Postvoid residuals found to be 22 mL.  Prevoid volume was 650 mL.    No clonus bilaterally.    No evidence of patellar hyperreflexia.      IMAGING:    MRI of the lumbar spine reveals no evidence of lumbar canal stenosis or malalignment.  No  gross evidence of spondylolisthesis.  No significant gross cord compression or peripheral nerve compression.  No significant foraminal stenosis.  STIR signal is present in the sacral 2 body.  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      ASSESSMENT:     1.  L5 transverse process fractures.   2.  Urinary and bowel incontinence does not appear to be consistent with cauda equina syndrome given examination, and no clear focal structural lesion on present radiologic workup.      RECOMMENDATIONS:     1.  No need for treatment or precautions for lumbar transverse process fractures.   2.  Urinalysis for workup of urinary incontinence.   3.  Trauma admission and tertiary exam.   4.  Orthopedic consultation for sacral fractures.   5.  Continue to monitor for urinary retention.   6.  MRI of the thoracic spine, specifically T2 sequence for evaluation of AV fistula and arterial venous malformation.        This patient was discussed with Dr. Leny Castillo, and Dr. Rey Ureña.         REY UREÑA MD, PHD     As dictated by ELI LUTZ MD            D: 2020   T: 2020   MT: IMAN      Name:     SHAHNAZ LARA   MRN:      [id number]        Account:       [id number]   :      1928           Consult Date:  2020      Document: [id number]

## 2020-01-05 NOTE — PLAN OF CARE
Alert and denies pain. Had Tylenol and thyroid medication with sip of H2O at beginning of day shift. Otherwise NPO since MN. Pt denies pain. Turned off back with assist of 2. Pino catheter has adequate amount of pale yellow urine. Lungs clear, diminished in bases. Pulse and CMS of extremities intact. Family at bedside, very supportive. Neuro and Ortho surgeons consulted with patient and family. Libby and  here to see patient. Brought to Pediatric PCA by butch. Family following butch over to [facility] for orthopedic surgery today.

## 2020-01-05 NOTE — PLAN OF CARE
Vital signs:  Temp: 98.5  F (36.9  C) Temp src: Oral BP: 123/60 Pulse: 64   Resp: 16 SpO2: 96 % O2 Device: None (Room air)     Pt arrived to unit from ED around 0130.  Activity: Strict bedrest d/t sacrum fx. Pt didn't want to log roll to get hover mat out, or for skin assessment.  Neuros: A&O x4.   Cardiac: WDL.   Respiratory: WDL on RA. Pulse ox on. Denies SOB.   GI/: Pino inserted, with adequate UOP. UA sent. +BS, +flatus, no BM.   Diet: NPO ex meds.   Skin: ALEKS.  Lines: Right PIV infusing LR @75mL/hr.   Pain/nausea: Oxy given x1, tylenol given x1.

## 2020-01-05 NOTE — ED NOTES
[facility], [place]   ED Nurse to Floor Handoff     Jyoti De Jesus is a 89 year old female who speaks English and lives with family members,  in a home  They arrived in the ED by car from home    ED Chief Complaint: Numbness    ED Dx;   Final diagnoses:   Saddle anesthesia   Urinary incontinence, unspecified type   Closed fracture of sacrum, unspecified portion of sacrum, initial encounter (H)   Fall, initial encounter         Needed?: No    Allergies: No Known Allergies.  Past Medical Hx: History reviewed. No pertinent past medical history.   Baseline Mental status: WDL  Current Mental Status changes: at basesline    Infection present or suspected this encounter: no  Sepsis suspected: No  Isolation type: No active isolations     Activity level - Baseline/Home:  Independent  Activity Level - Current:   Stand with Assist    Bariatric equipment needed?: No    In the ED these meds were given:   Medications   lactated ringers infusion ( Intravenous New Bag 1/4/20 2229)   oxyCODONE (ROXICODONE) tablet 5 mg (5 mg Oral Given 1/4/20 2247)   brimonidine (ALPHAGAN) 0.2 % ophthalmic solution 2 drop (2 drops Both Eyes Given 1/4/20 2244)   dorzolamide-timolol (COSOPT) ophthalmic solution 2 drop (2 drops Right Eye Given 1/4/20 2245)       Drips running?  No    Home pump  No    Current LDAs  Peripheral IV 01/04/20 Right Upper forearm (Active)   Site Assessment WDL 1/4/2020 10:20 PM   Line Status Saline locked 1/4/2020 10:20 PM   Phlebitis Scale 0-->no symptoms 1/4/2020 10:20 PM   Infiltration Scale 0 1/4/2020 10:20 PM   Number of days: 0       Labs results:   Labs Ordered and Resulted from Time of ED Arrival Up to the Time of Departure from the ED   CBC WITH PLATELETS DIFFERENTIAL - Abnormal; Notable for the following components:       Result Value    RBC Count 3.41 (*)      (*)     MCH 35.2 (*)     All other components within normal limits   COMPREHENSIVE METABOLIC PANEL -  "Abnormal; Notable for the following components:    GFR Estimate 55 (*)     All other components within normal limits   INR   UA MACROSCOPIC WITH REFLEX TO MICRO AND CULTURE   ABO/RH TYPE AND SCREEN       Imaging Studies:   Recent Results (from the past 24 hour(s))   CT Pelvis Bone wo Contrast    Narrative    EXAM: CT PELVIS BONE WO CONTRAST  LOCATION: [facility]  DATE/TIME: 1/4/2020 9:05 PM    INDICATION: Fall, possible sacrum fracture.  COMPARISON: None.  TECHNIQUE: CT scan of the pelvis was performed without IV contrast. Multiplanar reformats were obtained. Dose reduction techniques were used.  CONTRAST: None.    FINDINGS:  Acute comminuted bilateral sacral alar fractures without significant displacement. The fractures involve the anterior cortex at the level of S1-S2, with extension to the bilateral sacroiliac joints and S1 and S2 neural foramen. Age-indeterminate fracture   at the S2-S3 junction with acute angulation on lateral view.    Acute bilateral L5 transverse process fractures. Age-indeterminate L5 superior endplate compression fracture which involves less than 50% of the vertebral body height. Mild degenerative changes of the lumbar spine.    Normal joint alignment maintained. There is no sacroiliac joint or pubic symphysis widening. Mild bilateral hip and sacroiliac joint degenerative changes. Diffuse osteopenia.    No hematoma. Colonic diverticulosis. Small bowel containing right inguinal hernia. No evidence of bowel obstruction. Atherosclerosis.      Impression    IMPRESSION:  1.  Acute comminuted bilateral sacral alar fractures.  2.  Acute bilateral L5 transverse process fractures.   3.  Age-indeterminate fracture at the S2-S3 junction.  4.  Age-indeterminate L5 vertebral body compression fracture.  5.  Other ancillary findings as described above.         Recent vital signs:   BP (!) 151/84   Pulse 79   Temp 98  F (36.7  C) (Oral)   Resp 18   Ht 1.594 m (5' 2.75\")   Wt 57.6 kg (127 " lb)   SpO2 97%   Breastfeeding No   BMI 22.68 kg/m      [place] Coma Scale Score: 15 (01/04/20 1697)       Cardiac Rhythm: Normal Sinus  Pt needs tele? No  Skin/wound Issues: None    Code Status: Full Code    Pain control: good    Nausea control: good    Abnormal labs/tests/findings requiring intervention:     Family present during ED course? Yes   Family Comments/Social Situation comments: family at bedside    Tasks needing completion: None    Pavan Arce, RN  [phone] [facility]

## 2020-01-06 PROBLEM — M81.0 OSTEOPOROSIS: Status: ACTIVE | Noted: 2020-01-06

## 2020-01-06 PROBLEM — E03.9 ACQUIRED HYPOTHYROIDISM: Status: ACTIVE | Noted: 2020-01-06

## 2020-01-06 LAB
ANION GAP SERPL CALCULATED.3IONS-SCNC: 11 MMOL/L (ref 3–14)
BUN SERPL-MCNC: 15 MG/DL (ref 7–30)
CALCIUM SERPL-MCNC: 7.3 MG/DL (ref 8.5–10.1)
CHLORIDE SERPL-SCNC: 104 MMOL/L (ref 94–109)
CO2 SERPL-SCNC: 21 MMOL/L (ref 20–32)
CREAT SERPL-MCNC: 0.73 MG/DL (ref 0.52–1.04)
ERYTHROCYTE [DISTWIDTH] IN BLOOD BY AUTOMATED COUNT: 12.8 % (ref 10–15)
GFR SERPL CREATININE-BSD FRML MDRD: 72 ML/MIN/{1.73_M2}
GLUCOSE SERPL-MCNC: 89 MG/DL (ref 70–99)
HCT VFR BLD AUTO: 25.5 % (ref 35–47)
HGB BLD-MCNC: 8.2 G/DL (ref 11.7–15.7)
MCH RBC QN AUTO: 34.7 PG (ref 26.5–33)
MCHC RBC AUTO-ENTMCNC: 32.2 G/DL (ref 31.5–36.5)
MCV RBC AUTO: 108 FL (ref 78–100)
PLATELET # BLD AUTO: 139 10E9/L (ref 150–450)
POTASSIUM SERPL-SCNC: 4.1 MMOL/L (ref 3.4–5.3)
RBC # BLD AUTO: 2.36 10E12/L (ref 3.8–5.2)
SODIUM SERPL-SCNC: 136 MMOL/L (ref 133–144)
WBC # BLD AUTO: 8 10E9/L (ref 4–11)

## 2020-01-06 PROCEDURE — 25800030 ZZH RX IP 258 OP 636: Performed by: STUDENT IN AN ORGANIZED HEALTH CARE EDUCATION/TRAINING PROGRAM

## 2020-01-06 PROCEDURE — 0QH204Z INSERTION OF INTERNAL FIXATION DEVICE INTO RIGHT PELVIC BONE, OPEN APPROACH: ICD-10-PCS | Performed by: ORTHOPAEDIC SURGERY

## 2020-01-06 PROCEDURE — 8E0WXBF COMPUTER ASSISTED PROCEDURE OF TRUNK REGION, WITH FLUOROSCOPY: ICD-10-PCS | Performed by: ORTHOPAEDIC SURGERY

## 2020-01-06 PROCEDURE — 25800030 ZZH RX IP 258 OP 636: Performed by: PEDIATRICS

## 2020-01-06 PROCEDURE — 25000132 ZZH RX MED GY IP 250 OP 250 PS 637: Performed by: NURSE PRACTITIONER

## 2020-01-06 PROCEDURE — 0QH304Z INSERTION OF INTERNAL FIXATION DEVICE INTO LEFT PELVIC BONE, OPEN APPROACH: ICD-10-PCS | Performed by: ORTHOPAEDIC SURGERY

## 2020-01-06 PROCEDURE — 99231 SBSQ HOSP IP/OBS SF/LOW 25: CPT | Performed by: INTERNAL MEDICINE

## 2020-01-06 PROCEDURE — 25000132 ZZH RX MED GY IP 250 OP 250 PS 637: Performed by: STUDENT IN AN ORGANIZED HEALTH CARE EDUCATION/TRAINING PROGRAM

## 2020-01-06 PROCEDURE — 80048 BASIC METABOLIC PNL TOTAL CA: CPT | Performed by: INTERNAL MEDICINE

## 2020-01-06 PROCEDURE — 0QH104Z INSERTION OF INTERNAL FIXATION DEVICE INTO SACRUM, OPEN APPROACH: ICD-10-PCS | Performed by: ORTHOPAEDIC SURGERY

## 2020-01-06 PROCEDURE — 25000128 H RX IP 250 OP 636: Performed by: STUDENT IN AN ORGANIZED HEALTH CARE EDUCATION/TRAINING PROGRAM

## 2020-01-06 PROCEDURE — 25000125 ZZHC RX 250: Performed by: STUDENT IN AN ORGANIZED HEALTH CARE EDUCATION/TRAINING PROGRAM

## 2020-01-06 PROCEDURE — 85027 COMPLETE CBC AUTOMATED: CPT | Performed by: INTERNAL MEDICINE

## 2020-01-06 PROCEDURE — 0QH004Z INSERTION OF INTERNAL FIXATION DEVICE INTO LUMBAR VERTEBRA, OPEN APPROACH: ICD-10-PCS | Performed by: ORTHOPAEDIC SURGERY

## 2020-01-06 PROCEDURE — 12000001 ZZH R&B MED SURG/OB UMMC

## 2020-01-06 PROCEDURE — 36415 COLL VENOUS BLD VENIPUNCTURE: CPT | Performed by: INTERNAL MEDICINE

## 2020-01-06 RX ORDER — CEFAZOLIN SODIUM 1 G/3ML
1 INJECTION, POWDER, FOR SOLUTION INTRAMUSCULAR; INTRAVENOUS EVERY 8 HOURS
Status: COMPLETED | OUTPATIENT
Start: 2020-01-06 | End: 2020-01-06

## 2020-01-06 RX ORDER — AMOXICILLIN 250 MG
2 CAPSULE ORAL 2 TIMES DAILY
Status: DISCONTINUED | OUTPATIENT
Start: 2020-01-06 | End: 2020-01-06

## 2020-01-06 RX ORDER — SODIUM CHLORIDE, SODIUM LACTATE, POTASSIUM CHLORIDE, CALCIUM CHLORIDE 600; 310; 30; 20 MG/100ML; MG/100ML; MG/100ML; MG/100ML
INJECTION, SOLUTION INTRAVENOUS
Status: DISPENSED
Start: 2020-01-06 | End: 2020-01-06

## 2020-01-06 RX ORDER — BISACODYL 10 MG
10 SUPPOSITORY, RECTAL RECTAL DAILY PRN
Status: DISCONTINUED | OUTPATIENT
Start: 2020-01-06 | End: 2020-01-14 | Stop reason: HOSPADM

## 2020-01-06 RX ORDER — POLYETHYLENE GLYCOL 3350 17 G/17G
17 POWDER, FOR SOLUTION ORAL DAILY
Status: DISCONTINUED | OUTPATIENT
Start: 2020-01-06 | End: 2020-01-14 | Stop reason: HOSPADM

## 2020-01-06 RX ORDER — LORAZEPAM 0.5 MG/1
0.5 TABLET ORAL EVERY 6 HOURS PRN
Status: DISCONTINUED | OUTPATIENT
Start: 2020-01-06 | End: 2020-01-14 | Stop reason: HOSPADM

## 2020-01-06 RX ORDER — SODIUM CHLORIDE 9 MG/ML
INJECTION, SOLUTION INTRAVENOUS CONTINUOUS
Status: DISCONTINUED | OUTPATIENT
Start: 2020-01-06 | End: 2020-01-14 | Stop reason: HOSPADM

## 2020-01-06 RX ORDER — FAMOTIDINE 10 MG
20 TABLET ORAL 2 TIMES DAILY
Status: DISCONTINUED | OUTPATIENT
Start: 2020-01-06 | End: 2020-01-07

## 2020-01-06 RX ADMIN — LEVOTHYROXINE SODIUM 75 MCG: 25 TABLET ORAL at 07:56

## 2020-01-06 RX ADMIN — BRIMONIDINE TARTRATE 2 DROP: 2 SOLUTION/ DROPS OPHTHALMIC at 08:54

## 2020-01-06 RX ADMIN — CALCIUM 500 MG: 500 TABLET ORAL at 07:56

## 2020-01-06 RX ADMIN — OXYCODONE HYDROCHLORIDE 5 MG: 5 TABLET ORAL at 11:53

## 2020-01-06 RX ADMIN — FAMOTIDINE 20 MG: 10 TABLET, COATED ORAL at 12:48

## 2020-01-06 RX ADMIN — OXYCODONE HYDROCHLORIDE 5 MG: 5 TABLET ORAL at 21:56

## 2020-01-06 RX ADMIN — MULTIPLE VITAMINS W/ MINERALS TAB 1 TABLET: TAB at 07:57

## 2020-01-06 RX ADMIN — ACETAMINOPHEN 975 MG: 325 TABLET, FILM COATED ORAL at 01:26

## 2020-01-06 RX ADMIN — OXYCODONE HYDROCHLORIDE 5 MG: 5 TABLET ORAL at 05:24

## 2020-01-06 RX ADMIN — SODIUM CHLORIDE, POTASSIUM CHLORIDE, SODIUM LACTATE AND CALCIUM CHLORIDE 250 ML: 600; 310; 30; 20 INJECTION, SOLUTION INTRAVENOUS at 05:54

## 2020-01-06 RX ADMIN — DORZOLAMIDE HYDROCHLORIDE AND TIMOLOL MALEATE 2 DROP: 20; 5 SOLUTION/ DROPS OPHTHALMIC at 21:04

## 2020-01-06 RX ADMIN — FAMOTIDINE 20 MG: 10 TABLET, COATED ORAL at 19:31

## 2020-01-06 RX ADMIN — DORZOLAMIDE HYDROCHLORIDE AND TIMOLOL MALEATE 2 DROP: 20; 5 SOLUTION/ DROPS OPHTHALMIC at 08:54

## 2020-01-06 RX ADMIN — OXYCODONE HYDROCHLORIDE 5 MG: 5 TABLET ORAL at 16:53

## 2020-01-06 RX ADMIN — SODIUM CHLORIDE: 9 INJECTION, SOLUTION INTRAVENOUS at 12:51

## 2020-01-06 RX ADMIN — OXYCODONE HYDROCHLORIDE 5 MG: 5 TABLET ORAL at 08:54

## 2020-01-06 RX ADMIN — SENNOSIDES AND DOCUSATE SODIUM 1 TABLET: 8.6; 5 TABLET ORAL at 07:57

## 2020-01-06 RX ADMIN — ACETAMINOPHEN 975 MG: 325 TABLET, FILM COATED ORAL at 09:40

## 2020-01-06 RX ADMIN — Medication 5000 UNITS: at 07:57

## 2020-01-06 RX ADMIN — BRIMONIDINE TARTRATE 2 DROP: 2 SOLUTION/ DROPS OPHTHALMIC at 21:04

## 2020-01-06 RX ADMIN — ACETAMINOPHEN 975 MG: 325 TABLET, FILM COATED ORAL at 19:31

## 2020-01-06 RX ADMIN — CEFAZOLIN 1 G: 1 INJECTION, POWDER, FOR SOLUTION INTRAMUSCULAR; INTRAVENOUS at 12:48

## 2020-01-06 RX ADMIN — OXYCODONE HYDROCHLORIDE 5 MG: 5 TABLET ORAL at 01:26

## 2020-01-06 RX ADMIN — SENNOSIDES AND DOCUSATE SODIUM 2 TABLET: 8.6; 5 TABLET ORAL at 19:30

## 2020-01-06 RX ADMIN — CALCIUM 500 MG: 500 TABLET ORAL at 19:30

## 2020-01-06 RX ADMIN — CEFAZOLIN 1 G: 1 INJECTION, POWDER, FOR SOLUTION INTRAMUSCULAR; INTRAVENOUS at 19:33

## 2020-01-06 ASSESSMENT — ACTIVITIES OF DAILY LIVING (ADL)
ADLS_ACUITY_SCORE: 16
ADLS_ACUITY_SCORE: 16
ADLS_ACUITY_SCORE: 18
ADLS_ACUITY_SCORE: 16

## 2020-01-06 NOTE — PROGRESS NOTES
"Neurosurgery Daily Progress Note  01/06/2020     Overnight events/subjective: Dressing c/d/i, no leaking noted. Pain controlled with medicine.      O: /54 (BP Location: Left arm)   Pulse 84   Temp 97.7  F (36.5  C) (Oral)   Resp 13   Ht 1.594 m (5' 2.75\")   Wt 57.6 kg (127 lb)   SpO2 99%   Breastfeeding No   BMI 22.68 kg/m        Exam:   Awake, alert, oriented x2.  PERRL, EOMI. Face symmetric, tongue midline uvula midline and palate elevated symmetrically  5/5 in bilateral upper and lower extremities  Sensation was intact in the distal hands and legs to light touch and pinprick  Light touch sensation and pinprick was intact in the duane-anal area  No clonus or valerio. DTRs 2+ throughout.     LABS: reviewed     A/P: 89 year old F POD #1 s/p spinopelvic fixation with orthopedic surgery colleagues.      - No CSF leak noted, please contact if any concerns should arise or if new weakness/numbness/tingling or bowel/bladder symptoms should occur.   - Will sign off, please contact with any questions or concerns.         Please contact the neurosurgery resident on call with questions by dialing * [phone], then entering [phone] when prompted        Yolanda Hall MD  Neurosurgery PGY5    Neurosurgery Faculty Note  Agree with above examination. I will continue to check in with the patient and the family in terms of her progress.    "

## 2020-01-06 NOTE — OR NURSING
PACU to Inpatient Nursing Handoff    Patient Jyoti De Jesus is a 89 year old female who speaks English.   Procedure Procedure(s):  OPEN REDUCTION INTERNAL FIXATION, FRACTURE, PELVIS, spinal instrumentation L4-S1, cement augmentation L4-L5, image guided surgery   Surgeon(s) Primary: Wai Mason MD  Assisting: Charanjit Lew MD  Resident - Assisting: Rudolph Mukherjee MD     No Known Allergies    Isolation  [unfilled]     Past Medical History   has no past medical history on file.    Anesthesia General   Dermatome Level     Preop Meds Not applicable   Nerve block Not applicable   Intraop Meds fentanyl (Sublimaze): 100 mcg total  ketamine (Ketalar): 28 mg given  ondansetron (Zofran): last given at 1641  lidocaine gtt running at 1mg/kg/hr   Local Meds Yes   Antibiotics cefazolin (Ancef) - last given at 1545     Pain Patient Currently in Pain: yes  Comfort: tolerable with discomfort  Pain Control: partially effective   PACU meds  fentanyl (Sublimaze): 100 mcg (total dose) last given at 1739   hydromorphone (Dilaudid): 03 mg (total dose) last given at 1754   oxycodone (Roxicodone): 5 mg (total dose) last given at 1819    PCA / epidural No   Capnography     Telemetry     Inpatient Telemetry Monitor Ordered? No        Labs Glucose Lab Results   Component Value Date    GLC 95 01/04/2020       Hgb Lab Results   Component Value Date    HGB 12.0 01/04/2020       INR Lab Results   Component Value Date    INR 1.06 01/04/2020      PACU Imaging Not applicable     Wound/Incision Incision/Surgical Site 01/05/20 Midline;Lower Back (Active)   Incision Assessment UT 1/5/2020  6:15 PM   Closure ALEKS 1/5/2020  6:15 PM   Incision Drainage Amount None 1/5/2020  6:15 PM   Dressing Intervention Clean, dry, intact 1/5/2020  6:15 PM   Number of days: 0       Incision/Surgical Site 01/05/20 Bilateral Back (Active)   Incision Assessment UTV 1/5/2020  6:15 PM   Closure ALEKS 1/5/2020  6:15 PM   Incision Drainage  Amount None 1/5/2020  6:15 PM   Dressing Intervention Clean, dry, intact 1/5/2020  6:15 PM   Number of days: 0      CMS        Equipment ice pack   Other LDA       IV Access Peripheral IV 01/04/20 Right Upper forearm (Active)   Site Assessment WDL 1/5/2020  6:00 PM   Line Status Infusing 1/5/2020  6:00 PM   Phlebitis Scale 0-->no symptoms 1/5/2020  6:00 PM   Infiltration Scale 0 1/5/2020  6:00 PM   Extravasation? No 1/5/2020  3:00 AM   Number of days: 1       Arterial Line 01/05/20 Radial (Active)   Number of days: 0      Blood Products Not applicable  mL   Intake/Output Date 01/05/20 0700 - 01/06/20 0659   Shift 1457-5196 6429-0208 7513-0045 24 Hour Total   INTAKE   I.V. 300 1300  1600   Shift Total(mL/kg) 300(5.21) 1300(22.57)  1600(27.77)   OUTPUT   Urine 600 450  1050   Blood  275  275   Shift Total(mL/kg) 600(10.42) 725(12.59)  1325(23)   Weight (kg) 57.61 57.61 57.61 57.61      Drains / Last Closed/Suction Drain 1 Right;Superior Back Accordion 10 Khmer (Active)   Site Description UTV 1/5/2020  6:15 PM   Dressing Status Normal: Clean, Dry & Intact 1/5/2020  6:15 PM   Drainage Appearance Bloody/Bright Red 1/5/2020  6:15 PM   Status To bulb suction 1/5/2020  6:15 PM   Number of days: 0       Urethral Catheter (Active)   Tube Description Positional 1/5/2020  8:25 AM   Collection Container Standard 1/5/2020  6:15 PM   Securement Method Securing device (Describe) 1/5/2020  6:15 PM   Rationale for Continued Use Wound Healing 1/5/2020  5:14 PM   Urine Output 600 mL 1/5/2020  9:00 AM   Number of days: 0      Time of void PreOp Void Prior to Procedure: 1130(last emptied) (01/05/20 1238)    PostOp Voided (mL): 600 mL (01/04/20 2047)    Diapered? No   Bladder Scan Bladder Scan Volume (mL): 22 ml (01/04/20 2047)   PO (sips with medication/NPO for procedure) (01/05/20 0825)  tolerating sips and apple sauce     Vitals    B/P: 110/65  T: 97.6  F (36.4  C)    Temp src: Axillary  P:  Pulse: 84 (01/05/20 1718)    Heart  Rate: 71 (01/05/20 1815)     R: 11  O2:  SpO2: 99 %    O2 Device: Other (Comments)(blow by) (01/05/20 1815)              Family/support present significant other and children and grandchildren   Patient belongings     Patient transported on cart and air mat   DC meds/scripts (obs/outpt) Not applicable   Inpatient Pain Meds Released? Yes       Special needs/considerations None   Tasks needing completion None       Fani Hansen RN  [facility]COM [id number]

## 2020-01-06 NOTE — PROGRESS NOTES
[facility]    Medicine Progress Note - Hospitalist Service       Date of Admission:  1/4/2020  Assessment & Plan       Jyoti De Jesus is a 89 year old female admitted on 1/4/2020 s/p following ORIF of fracture pelvis and spinal instrumentation L4-S1, cement augmentation L4-l5    She has a known H/O Hypothyroidism and glaucoma.   Internal medicine consulted for post operative co management.  Individual problems and their management are outlined below      S/P  ORIF of fracture pelvis and spinal instrumentation L4-S1, cement augmentation L4-l5, POD#1    EBL at 275 ml. Postyop Hgb at 8.2 ( Pre op at 12.0) . No requirement for transfusion   Post op management by Spine team including pain management  Stop IV fluids as patient is tolerating diet  DVT prophylaxis with PCD's  PT/OT as per protocol   Patient will come off Telemetry    # Hypothyroidism:   Continue PTA Levothyroxine 75 mcg/day    # Glaucoma:   Ct PTA eye drops.            Diet: Regular Diet Adult    DVT Prophylaxis: Pneumatic Compression Devices  Pino Catheter: in place, indication: Wound Healing  Code Status: Full Code      Disposition Plan   Expected discharge:  To be determined by primary team   Entered: Karl Amador MD 01/06/2020, 9:08 AM       The patient's care was discussed with the Bedside Nurse, Care Coordinator/, Patient, Patient's Family and Primary team.    Karl Amador MD  Hospitalist Service  [facility]    ______________________________________________________________________    Interval History   Jyoti feels well this morning. She was ready to have her breakfast   grand daughter ( RN) at bedside   No fevers or chills   Kwaku tingling or numbness in the feet  Pain well controlled   No nausea, vomiting or diarrhea         Data reviewed today: I reviewed all medications, new labs and imaging results over the last 24  hours. I personally reviewed no images or EKG's today.    Physical Exam   Vital Signs: Temp: 97.7  F (36.5  C) Temp src: Oral BP: 107/54 Pulse: 84 Heart Rate: 77 Resp: 13 SpO2: 99 % O2 Device: Nasal cannula Oxygen Delivery: 2 LPM  Weight: 127 lbs 0 oz  General Appearance: Awake, alert and not in distress  Respiratory: Clear breath sounds bilaterally  Cardiovascular: Normal heart sounds. No murmurs   GI: Soft, non tender. Normal bowel sounds   MSK : Unable to examine the back. No lower extremity edema. Drains noted from spine incision   Other: Awake, alert and orientated X 3.    Data   Recent Labs   Lab 01/06/20  0811 01/04/20  2200   WBC 8.0 9.3   HGB 8.2* 12.0   * 105*   * 210   INR  --  1.06    133   POTASSIUM 4.1 4.1   CHLORIDE 104 101   CO2 21 27   BUN 15 19   CR 0.73 0.92   ANIONGAP 11 5   SAMMIE 7.3* 9.2   GLC 89 95   ALBUMIN  --  3.5   PROTTOTAL  --  7.0   BILITOTAL  --  0.5   ALKPHOS  --  118   ALT  --  21   AST  --  18

## 2020-01-06 NOTE — PROGRESS NOTES
"Orthopaedic Surgery Progress Note:  01/06/2020     Subjective:   POD #1.  No acute events overnight.  Pain is well controlled.  Tolerating a diet.  Pino in place, no BM.  Denies chest pain, shortness of breath, nausea, vomiting.     Objective:   BP 95/54 (BP Location: Left arm)   Pulse 84   Temp 97.7  F (36.5  C) (Oral)   Resp 10   Ht 1.594 m (5' 2.75\")   Wt 57.6 kg (127 lb)   SpO2 99%   Breastfeeding No   BMI 22.68 kg/m    I/O this shift:  In: -   Out: 335 [Urine:295; Drains:40]  Gen: NAD. Resting comfortably in bed  Resp: Breathing comfortably on RA  Drain:   Superficial drain output of 20 at 24/7 hours, respectively.  Musculoskeletal: Dressing is C/D/I.      Lower extremities:  Motor Strength Right Left   Hip flexion: L1, L2, L3 4/5 4/5   Hip adduction: L2, L3 5/5 5/5   Knee flexion: S1 5/5 5/5   Knee extension: L3, L4 5/5 5/5   Ankle dosiflexion: L4, L5 5/5 5/5   EHL: L5 4/5 4/5   Ankle plantarflexion: S1 5/5 5/5     Sensation from L1-S2 is preserved.    Labs:  Lab Results   Component Value Date    WBC 9.3 01/04/2020    HGB 12.0 01/04/2020     01/04/2020    INR 1.06 01/04/2020        Assessment & Plan:   Jyoti De Jesus is a 89 year old female now s/p SpinoPelvic fixation on 1/5 with Dr. Mason.     Orthopedics Primary  Activity: No HOB restrictions.  Bed to Chair only x3 days.  Then up with assistive device.  No excessive bending or twisting. No lifting >10 lbs x 6 weeks. No Juan lift for transfers.   Weight bearing status: Protected WB  Pain management: Transition from IV to PO as tolerated. No NSAIDs   Antibiotics: Ancef x24 hours  Diet: Begin with clear fluids and progress diet as tolerated.   DVT prophylaxis: SCDs only. No chemical DVT ppx needed.  Imaging: XR Upright PTDC - ordered.  Labs: Labs PRN  Bracing/Splinting: None.  Dressings: Keep Aquacel c/d/i x 7 days.  Drains: Document output per shift, will be discontinued at Orthopedic Surgery discretion.  Pino catheter: Remove " POD#3.   Physical Therapy/Occupational Therapy: Eval and treat.  Cultures: None  Consults: Hospitalist.  Follow-up: Clinic with Dr. Mason in 6 weeks with repeat x-rays.   Disposition: Pending progress with therapies, pain control on orals, and medical stability, anticipate discharge to rehab vs home on POD #~7.    Orthopaedics surgery staff for this patient is Dr. Mason.    ------------------------------------------------------------------------------------------      Rudolph Mukherjee MD  Orthopedic Surgery, PGY-4  Pager: [phone]        Please contact me directly regarding this patient during normal business hours Mon-Fri before 5pm @ [phone].  IF it is Night, a Weekend, or there is No Response, then please page the Orthopaedic Resident On Call in [facility]. Thank you!        FOLLOWUP:    No future appointments.

## 2020-01-06 NOTE — OP NOTE
Procedure Date: 01/05/2020      PREOPERATIVE DIAGNOSIS:  Sacral insufficiency fracture, closed, with instability and possible cauda equina syndrome, Lumbar 5 transverse process fractures     POSTOPERATIVE DIAGNOSIS:   Sacral insufficiency fracture, closed, with instability and possible cauda equina syndrome, Lumbar 5 transverse process fractures       PROCEDURES:   1.  Pelvic fixation.   2.  Segmental spinal instrumentation L4 to S1.   3.  Cement augmentation L4 and L5.   4.  Image-guided surgery.      SURGEON:  Wai Mason Jr.       COSURGEON: (For the pelvic fixation portion) Charanjit Lew MD      ASSISTANT:  Rudolph Mukherjee MD      INDICATION FOR OPERATION:  The patient is a 89-year-old female who fell approximately 2 weeks ago and, over the last few days, has had progressive worsening of pelvic pain and change in bowel/bladder control.  She also noted that when she wiped her butt that she did not feel the sensation normally.  She then presented to the emergency room, where she was evaluated by Neurosurgery and by Orthopedic Surgery.  She was found to have a sacral H-type insufficiency fracture on CT and MRI.  Of note is there also appeared to be a fluid mass around the S2-S3 level that was dorsal, and it is unclear exactly the etiology of this.      I was called by the orthopedic team, asking me to take a look at this.  I reviewed the imaging this morning at 6:45 a.m., helped develop a plan, spoke with the Neurosurgery attending, who saw the patient as well, Dr. Rey Taylor.  I went in and saw the patient and reviewed this with her , son, daughter and granddaughter.  My recommendation was that we fix this, as it appeared that there is a dynamic component to her instability that when she is supine her rectal exam appears to be relatively intact, but when she is sitting upright is when she seems to have her bowel/bladder function and sensory changes.  We extensively discussed the alternative  strategies, the risks, benefits and expected outcomes, and that no matter how this is treated, it is challenging because it is an osteoporotic insufficiency fracture.  I measured her vertebral body bone mineral density on the CT scan, and it was approximately 70 Hounsfield units; 135 Hounsfield units is normal.  Given this degree of osteoporosis, healing and mobilization is a challenge.  So, we discussed the potential strategies, and ultimately the family decided for surgery.      DESCRIPTION OF TECHNICAL PROCEDURES:  The OR team was briefed about the plan, and the patient was brought to the operating room and underwent the induction of adequate general anesthesia and was turned in position prone on a 4-poster frame.  She was then prepared and draped in the usual sterile fashion.  A timeout was done, confirming the site and type of surgery.  She did receive prophylactic antibiotics.      The spine was now exposed from L4 through S3, and a spinous process tracking arc was placed on the spinous process of L5.  Intraoperative 3D images were obtained with the O-arm and transferred to the Stealth image-guided workstation.  This was now used to place pedicle screws in a navigated fashion at L4 and L5.  These were from the [employer] Solera system.  These were fenestrated screws, with the anticipation of placing cement augmentation into them.  The screws were placed by identifying the start point, creating a tract and using the navigated taps and navigated screw placement.  At L4, we placed 5.5 x 40 bilaterally; at L5, 6.5 x 40 bilaterally.  We moved the reference arc to the spinous process of L4 and obtained a spin to use for navigation of the pelvis.  The pedicle screws were well positioned, and we then used the scan to map out trajectories for 2 through-and-through screws at S1 and a through-and-through screw at S2.  These were iliosacral screws, and they were Synthes 6.5 mm outer diameter cannulated fully threaded  titanium screws.  Utilizing navigation, we placed a K-wire at S1 from left to right in the cephalad portion of S1, and then 1 from right to left and the caudad portion of S1, and then from left to right in the S2 segment.  We obtained a spin to check this, and because of the stainless steel K-wires, these were challenging to interpret, but it appeared that they were in the right location.  These were then drilled and screws placed and checked under fluoroscopy.  Under fluoroscopy, they looked okay.  A 3D spin was done which confirmed good positioning.  This 3D spin was now used to place S3 alar iliac screws that were 9.5 x 90 mm in length.  This was done by creating a tract, utilizing a navigated tap to tap to the nominal diameter and then navigated ballast screw placement.  These were ballast screws from [employer] titanium screws with cobalt chrome heads.  We were then able to place S1 pedicle screws around the S1 through-and-through screws, and these were 5.5 x 35 mm multiaxial screws from the Solera system, and we did place approximately 1 to  1.5 mL of cement in each of the L4 and L5 screws.  We then cut and contoured 2 titanium rods and seated these into place.  Of note is that we did not disrupt the facet capsules at L3-L4, L4-L5 or L5-S1, so that if desired, we could remove these pedicle screws down the road, since we did not fuse the spine, once the sacral pelvis has healed; although given the patient's advanced age, my anticipation is that she will not necessarily need these screws removed ever.  The wound was irrigated out copiously.  Final 2D and 3D images were obtained, which showed optimal fixation and instrumentation, and the wound was closed in layers.  A gram of vancomycin powder was placed deep in the wound.  Interrupted #1 absorbables were used to close the thoracolumbar fascia, then a #1 runner was used over top of this.  An 1/8 inch Hemovac drain was placed superficial to the fascia and brought  out proximally on the right side.  Deep reapproximation sutures were used to close the subcutaneous tissue, then a subcutaneous running suture and then an intradermal suture.  The percutaneous incisions for the through-and-through screws were closed with 2-0 interrupted sutures, followed by 4-0 Monocryl, followed by Dermabond on the skin.      Upon completion of the key and critical portions of the case, I went and spoke to the family and provided them copies of the imaging.  Estimated blood loss for this case was 275 mL.  For pain control, we did use intraoperative IV lidocaine on the patient, and I did use Marcaine 0.25% with epinephrine 50 mL for duane-incisional injections.      In terms of weightbearing plan, we will let her sit up to 30 degrees in bed over the next day or two and then hopefully progress to bed-to-chair ambulation in the room. and then depending on her pain status, we may progress to a walker, but we will see her on this.      Of note is I coordinated this case with Dr. Rey Taylor from Neurosurgery.  I was concerned that we were going to need to do a sacral laminectomy and that there was a high probability of a dural tear if that fluid accumulation was a pseudomeningocele, and he was prepared to support this.  Once we had the instrumentation in place, in directly looking at the dorsal sacrum, there was not evidence of the fracture line; it must have been more ventral.  The fracture was clearly present, but it did not have a dorsal pseudomeningocele that was obvious.  Dr. Taylor came into the OR and reviewed the intraoperative CTs with me, and we jointly made the decision that we would see how she was doing with the fixation rather than aggressively decompressing this.      All of this was communicated to the family as well.         CORA ROMAN JR, MD             D: 01/05/2020   T: 01/05/2020   MT: IMAN      Name:     SHAHNAZ LARA   MRN:      [id number]        Account:        [id number]    :      1928           Procedure Date: 2020      Document: [id number]       cc: Charanjit Matt MD

## 2020-01-06 NOTE — PROGRESS NOTES
"CLINICAL NUTRITION SERVICES - ASSESSMENT NOTE     Nutrition Prescription    RECOMMENDATIONS FOR MDs/PROVIDERS TO ORDER:  None at this time    Malnutrition Status:    Patient does not meet two of the established criteria necessary for diagnosing malnutrition but is at risk for malnutrition    Recommendations already ordered by Registered Dietitian (RD):  Magic cup (chocolate) at HS snack  Gelatein (cherry) at 2 pm snack    Future/Additional Recommendations:  Monitor PO intake and wt trends  Monitor acceptance of nutrition supplements      REASON FOR ASSESSMENT  Jyoti De Jesus is a/an 89 year old female assessed by the dietitian for Admission Nutrition Risk Screen for reduced oral intake over the last month and provider order- Dietitian to Assess and Order per Nutrition Protocol.    NUTRITION HISTORY  Pt reports that she usually eats 3 meals a day. Breakfast, a light lunch, and then dinner. Pt reports that pain has caused her to eat less than usual the past week.     CURRENT NUTRITION ORDERS  Diet: Regular  Intake/Tolerance: Pt reports that this morning she had orange juice, a few bites of egg, crackers, and was working on eating some cereal still.     LABS  Labs reviewed    MEDICATIONS  Medications reviewed  Calcium carbonate  Vit D3  Thera-vit-m  Senokot  Lactated ringers infusion @ 75 mL/hr    ANTHROPOMETRICS  Height: 159.4 cm (5' 2.75\")  Most Recent Weight: 57.6 kg (127 lb)    IBW: 51.7 kg (112%)   BMI: Normal BMI  Weight History: Pt reports a UBW of 127 lbs.   Wt Readings from Last 10 Encounters:   01/04/20 57.6 kg (127 lb)     Dosing Weight: 58 kg    ASSESSED NUTRITION NEEDS  Estimated Energy Needs: 1935-4590 kcals/day (25 - 30 kcals/kg)  Justification: Maintenance  Estimated Protein Needs: 58-70 grams protein/day (1 - 1.2 grams of pro/kg)  Justification: Preservation of LBM  Estimated Fluid Needs: (1 mL/kcal)   Justification: Maintenance    PHYSICAL FINDINGS  See malnutrition section " below.    MALNUTRITION  % Intake: < 75% for > 7 days (non-severe)  % Weight Loss: None noted  Subcutaneous Fat Loss: None observed  Muscle Loss: Temporal: Mild (likely due to aging)   Fluid Accumulation/Edema: None noted  Malnutrition Diagnosis: Patient does not meet two of the established criteria necessary for diagnosing malnutrition but is at risk for malnutrition    NUTRITION DIAGNOSIS  Inadequate oral intake related to decreased appetite 2/2 pain as evidenced by pt report of eating less than baseline over the past week      INTERVENTIONS  Implementation  Nutrition Education: Discussed current PO/appetite and role of RD. Encouraged pt to eat meals TID and 2 nutrition supplements daily. Discussed the importance of adequate nutrition for energy and healing.    Medical food supplement therapy: See above      Goals  Patient to consume % of nutritionally adequate meal trays TID, or the equivalent with supplements/snacks.     Monitoring/Evaluation  Progress toward goals will be monitored and evaluated per protocol.    Julienne Gomez RD,LD  Unit Pager: [phone]

## 2020-01-06 NOTE — CONSULTS
Endocrinology Consult     Jyoti De Jesus MRN:[id number] YOB: 1928  Date of Admission:1/4/2020  Primary care provider: Jet Matt  Reason for visit/consult: Sacral fracture  Requesting physician: Dr. Matt           Assessment and Plan:   Jyoti De Jesus is a 89 year old female with a history of hypothyroidism and osteoporosis, admitted with new bowel and bladder incontinence and worsening low back pain following a fall from standing height resulting in a comminuted sacral fracture, now s/p ORIF with pelvic fixation. She has a history of osteoporosis, previously treated with bisphosphonate for ~5 years, currently on calcium and vitamin D supplementation at home. No recent or prior DEXA scan results are available for review. Recent TSH is in normal range and last vitamin D level demonstrates deficiency is appropriately treated. Anemia appears consistent with acute blood loss and not a chronic issues and renal function is fairly normal. No obvious signs or symptoms to suggest secondary causes of osteoporosis (malabsorption, hypercalciuria, myeloma or Cushing syndrome).       1. Osteoporosis with fragility (sacral) fracture  - Check Vitamin D, PTH, phosphorus, magnesium, calcium with next lab draw   - Obtain non-urgent DEXA scan to obtain baseline bone mineral density- can be completed after discharge but should be done prior to endocrine follow up appointment  - Will defer evaluation for myeloma, malabsorption or hypercalciuria at this time pending initial workup outlined above  - Recommend total of 1200mg calcium daily through diet or supplements. Would continue calcium carbonate or citrate supplement on discharge (in place of home calcium acetate)  - Recommend continuing home vitamin D supplement 5000 international unit(s) daily   - Encouraged continuing weight bearing exercises for bone strength pending recovery from fracture  - Follow up in endocrine clinic with  probable plan for treatment with Reclast 5mg IV annually (pending labs and DEXA results)    2. Hypothyroidism  Last TSH in Care Everywhere 11/2019 in normal range and has been stable for years.  - Recommend continuing home levothyroxine dose 75mcg PO daily in AM on empty stomach. - Ensure calcium supplement not taken at same time.   - Recommend outpatient follow up with PCP after discharge for ongoing management of hypothyroidism.    We will attempt to follow up with patient again during her hospital stay pending results of testing listed above.     Patient was seen and examined with attending physician, Dr. Salgado.     Kristin Lucas MD, MPAS  Endocrine consults   PGY3, Internal Medicine   [phone]      Physician Attestation   I saw this patient with the resident and agree with the findings and plan of care as documented in the note.      89 year old woman with osteoporosis and new fragility fracture; she also has an old compression fracture sustained without known trauma seen on imaging. Agree with further lab work-up, DEXA, and follow up in endocrine clinic with plan to start osteoporosis medication at that time. Continue calcium and vitamin D supplementation.     Date of Service (when I saw the patient): 1/6/2020    Karen Salgado MD  Endocrinology and Diabetes   [phone]        HPI:  Jyoti De Jesus is a 89 year old female with a history of hypothyroidism and osteoporosis, admitted on 1/4/20 with new bowel and bladder incontinence and worsening low back pain following a fall resulting in a sacral fracture on 12/14/19. She initially fell on the garage floor after slipping on water going to get her newspaper. She was able to ambulate but pain progressively worsened and she was seen in [facility] on 12/26. She subsequently developed worsening pain, bowel and bladder incontinence and decreased perineal sensation and required a walker to ambulate. She was seen by her PCP on Friday, underwent MRI  spine followed by CT pelvis, demonstrating demonstrated comminuted sacral fracture involving both sacral ala. Her PCP instructed her to be evaluated at [facility] ED, where she presented on Saturday evening.  MRI sacrum confirmed findings and she was admitted to orthopedic surgery, taken to the OR on 1/5 for ORIF and pelvic fixation without complications.     She reports a history of osteoporosis diagnosed on bone density scan ~10 years ago. She was treated with fosamax for ~5 years, then required dental work and fosamax was stopped for 3 months prior and 3 months following. At that time, she and her PCP elected to discontinue fosamax and started vitamin D supplementation along with daily calcium for osteoporosis. She reports jaw 'cracking' sensation while taking fosamax which has since resolved. She denies any history or current symptoms of GERD or dysphagia. She denies any prior known fractures but has had a few falls. She takes levothyroxine for hypothyroidism for years with no change in dosing and annual thyroid function tests and vitamin D levels. She walks for minimum of 50 minutes daily with her  for the past 30 years. She lost ~8lbs recently following the death of her brother but no other significant weight changes. Energy is good and she denies any mood changes, weight gain, abdominal pain, diarrhea, abdominal surgeries, problems with absorption or history of kidney stones. No history of radiation therapy or radiation to bones.     OSTEOPOROSIS risk factors (nonmodifiable)  Personal Hx of fracture as an adult: Yes: L5 compression fracture  Hx of fracture in first-degree relative: No   race: Yes     OSTEOPOROSIS risk factors (potentially modifiable):  Prolonged or chronic steroid use: No  Tobacco use: Yes: remote  Low body weight (<127 lbs): Yes  Estrogen deficiency     early menopause (age <45) or bilateral ovariectomy: No     prolonged premenopausal amenorrhea (>1 yr)  No  Low calcium intake  (lifelong): No  Alcoholism: No  Recurrent falls: Yes  Inadequate physical activity: No    Current calcium and Vit D intake:  Supplements: Calcium acetate 500mg daily,  Vit D3 5000 units daily  Diet: One 8 oz glass of milk daily       ROS:  10 point negative unless mentioned in HPI. +constipation. +falls. +perineal numbness.     Past Medical/Surgical History:  History reviewed. No pertinent past medical history.  Past Surgical History:   Procedure Laterality Date     OPEN REDUCTION INTERNAL FIXATION PELVIS N/A 1/5/2020    Procedure: OPEN REDUCTION INTERNAL FIXATION, FRACTURE, PELVIS, spinal instrumentation L4-S1, cement augmentation L4-L5, image guided surgery;  Surgeon: Wai Mason MD;  Location: [facility] OR       Allergies:  No Known Allergies    PTA Meds:  Prior to Admission medications    Medication Sig Last Dose Taking? Auth Provider   aspirin 81 MG EC tablet Take 81 mg by mouth daily Past Week at Unknown time Yes Reported, Patient   brimonidine (ALPHAGAN-P) 0.15 % ophthalmic solution Place 2 drops into both eyes 2 times daily  Yes Reported, Patient   calcium acetate (PHOSLO) 667 MG CAPS capsule Take 500 mg by mouth daily  Yes Reported, Patient   cholecalciferol (VITAMIN D3) 5000 units (125 mcg) capsule Take 5,000 Units by mouth daily  Yes Reported, Patient   dorzolamide-timolol (COSOPT) 2-0.5 % ophthalmic solution Place 2 drops into the right eye 2 times daily  Yes Reported, Patient   levothyroxine (SYNTHROID/LEVOTHROID) 75 MCG tablet Take 75 mcg by mouth daily  Yes Reported, Patient   multivitamin w/minerals (MULTI-VITAMIN) tablet Take 1 tablet by mouth daily  Yes Reported, Patient        Current Medications:   Current Facility-Administered Medications   Medication     [START ON 1/8/2020] acetaminophen (TYLENOL) tablet 650 mg     acetaminophen (TYLENOL) tablet 975 mg     benzocaine-menthol (CEPACOL) 15-3.6 MG lozenge 1 lozenge     bisacodyl (DULCOLAX) Suppository 10 mg     brimonidine (ALPHAGAN) 0.2 %  "ophthalmic solution 2 drop     calcium carbonate 500 mg (elemental) (OSCAL;OYSTER SHELL CALCIUM) tablet 500 mg     ceFAZolin (ANCEF) 1 g vial to attach to  ml bag for ADULT or 50 ml bag for PEDS     cholecalciferol (VITAMIN D3) 5000 units (125 mcg) capsule 5,000 Units     dorzolamide-timolol (COSOPT) ophthalmic solution 2 drop     famotidine (PEPCID) tablet 20 mg    Or     famotidine (PEPCID) infusion 20 mg     HYDROmorphone (PF) (DILAUDID) injection 0.2 mg     lactated ringers infusion     lactated ringers injection     levothyroxine (SYNTHROID/LEVOTHROID) tablet 75 mcg     lidocaine (LMX4) cream     lidocaine 1 % 1 mL     lidocaine 400 mg in D5W 50 ml (ADULT STD) mixture - for ANALGESIA     LORazepam (ATIVAN) tablet 0.5 mg     melatonin tablet 3 mg     multivitamin w/minerals (THERA-VIT-M) tablet 1 tablet     naloxone (NARCAN) injection 0.1-0.4 mg     ondansetron (ZOFRAN-ODT) ODT tab 4 mg    Or     ondansetron (ZOFRAN) injection 4 mg     oxyCODONE (ROXICODONE) tablet 5 mg     polyethylene glycol (MIRALAX/GLYCOLAX) Packet 17 g     polyethylene glycol (MIRALAX/GLYCOLAX) Packet 17 g     senna-docusate (SENOKOT-S/PERICOLACE) 8.6-50 MG per tablet 1-2 tablet     sodium chloride (PF) 0.9% PF flush 3 mL     sodium chloride (PF) 0.9% PF flush 3 mL     sodium chloride 0.9% infusion     [START ON 1/7/2020] sodium phosphate (FLEET ENEMA) 1 enema       Family History:  History reviewed. No pertinent family history.     Social History:  Lives at home in [facility] with her . Quit smoking >50 years ago.   Social History     Tobacco Use     Smoking status: Former Smoker     Smokeless tobacco: Never Used   Substance Use Topics     Alcohol use: Yes     Comment: daily         Exam:  /54 (BP Location: Left arm)   Pulse 84   Temp 97.7  F (36.5  C) (Oral)   Resp 13   Ht 1.594 m (5' 2.75\")   Wt 57.6 kg (127 lb)   SpO2 99%   Breastfeeding No   BMI 22.68 kg/m    Vitals:    01/04/20 1807   Weight: 57.6 kg (127 lb) "     General: Alert, sitting in bed, in no acute distress.   Lymph: No cervical, axillary, or supraclavicular adenopathy. No thyromegaly or masses.   Skin: Warm, dry, no rashes or lesions on limited exam, no abdominal striae   HEENT:  EOMI, anicteric sclera. Pupils equal. Oral mucosa pink and moist with no lesions.  Respiratory: Non-labored breathing, symmetric air entry, no rales or wheezing.   Cardiovascular: Regular rate and rhythm. S1S2. No murmur or rub.   Gastrointestinal:  Abdomen soft, non-distended, nontender. No palpable masses or organomegaly.   : last with pale michelle urine.   Extremities: No extremity edema. Warm, dry. Normal tone.  MSK: No deformities, strength grossly normal BLE on DF/PF/HF.   Neurologic: A&O x 3, speech normal, memory intact, no tremor on outstretched hands. Biceps and patellar DTRs 2+ bilaterally. Sensation intact BLE to light touch.   Psych: appropriate mood and affect, cooperative and pleasant     Endocrine Labs/Imaging:  Reviewed in EPIC    TSH 11/7/19  1.67  Vit D 11/2016   44.7    CT pelvis 1/4/20:  1.  Acute comminuted bilateral sacral alar fractures.  2.  Acute bilateral L5 transverse process fractures.   3.  Age-indeterminate fracture at the S2-S3 junction.  4.  Age-indeterminate L5 vertebral body compression fracture.  5.  Other ancillary findings as described above    MRI Sacrum 1/5/20:  1.  Acute fractures of both sacral wings and the S3 sacral body. There is mild narrowing of the sacral central canal due to angulation of the sacral body fracture.  2.  Acute fractures of the transverse processes of L5, and likely the left pubic bone.  3.  No hematoma. No specific evidence for sacral nerve compression.  4.  Mild marrow edema involving the left lesser trochanter could be related to contusion or stress injury.

## 2020-01-06 NOTE — ANESTHESIA POSTPROCEDURE EVALUATION
Anesthesia POST Procedure Evaluation    Patient: Jyoti De Jesus   MRN:     [id number] Gender:   female   Age:    89 year old :      1928        Preoperative Diagnosis: * No pre-op diagnosis entered *   Procedure(s):  OPEN REDUCTION INTERNAL FIXATION, FRACTURE, PELVIS, spinal instrumentation L4-S1, cement augmentation L4-L5, image guided surgery   Postop Comments: No value filed.       Anesthesia Type:  Not documented  General    Reportable Event: NO     PAIN: Uncomplicated   Sign Out status: Comfortable, Well controlled pain     PONV: No PONV   Sign Out status:  No Nausea or Vomiting     Neuro/Psych: Uneventful perioperative course   Sign Out Status: Preoperative baseline; Age appropriate mentation     Airway/Resp.: Uneventful perioperative course   Sign Out Status: Non labored breathing, age appropriate RR; Resp. Status within EXPECTED Parameters     CV: Uneventful perioperative course   Sign Out status: Appropriate BP and perfusion indices; Appropriate HR/Rhythm     Disposition:   Sign Out in:  PACU  Disposition:  Floor  Recovery Course: Uneventful  Follow-Up: Not required           Last Anesthesia Record Vitals:  CRNA VITALS  2020 1640 - 2020 1740      2020             Resp Rate (observed):  (!) 1          Last PACU Vitals:  Vitals Value Taken Time   BP     Temp     Pulse     Resp 18 2020  6:21 PM   SpO2 99 % 2020  6:21 PM   Temp src     NIBP     Pulse     SpO2     Resp     Temp     Ht Rate     Temp 2     Vitals shown include unvalidated device data.      Electronically Signed By: Antonino Stiles MD, 2020, 6:22 PM

## 2020-01-06 NOTE — PLAN OF CARE
"  Pt arrived on unit @ 1915. Transferred to bed via hovermat without incident.  VS:   /50 (BP Location: Right arm)   Pulse 84   Temp 97.6  F (36.4  C) (Oral)   Resp 11   Ht 1.594 m (5' 2.75\")   Wt 57.6 kg (127 lb)   SpO2 100%   Breastfeeding No   BMI 22.68 kg/m    NC @ 2L   Output:   Last putting out adequate urine. Denies passing gas, hypoactive bowel sounds.    Lungs CTA, diminished in bases   Activity:   Bedrest with head of bed <30 degrees for 2 days. Repositions with A-1.    Skin: Small bruising on arms, skin tear on R arm covered with tegaderm. Skin is fragile.   Pain:   Denies pain, only when moving. Took 5mg Oxycodone to stay ahead of it.    Neuro/CMS:   A&Ox3, denies numbness and tingling   Dressing(s):   Very small shadowing on midline dressing, otherwise CDI   Diet:   NPO-paged MD to advance   LDA:   L PIV infusing, last, hemo   Equipment:   IV pole, PCD's, hot ice machine, NC   Plan:   Continue to monitor and follow plan of care. Able to make needs known and call light within reach   Additional Info:   Granddaughter in room       "

## 2020-01-06 NOTE — PLAN OF CARE
"  VS:    /54 (BP Location: Left arm)   Pulse 84   Temp 97.7  F (36.5  C) (Oral)   Resp 13   Ht 1.594 m (5' 2.75\")   Wt 57.6 kg (127 lb)   SpO2 99%   Breastfeeding No   BMI 22.68 kg/m  Stable no abnormalities noted. No reports of SOB, CP, or dizziness noted. LS diminished/clear, on RA   Output:    Voids in last catheter - patent, continue to monitor low urine output - remove POD#3. Last BM 1/3/20, no passing gas yet    Activity:    Off restrictions, bed to chair only    Skin: Intact w/ the exception of incisions/drains/devices/skin tear (R forearm)    Pain:    Minimal. Has available schedule tylenol and PRN oxy 5mg q3h    Neuro/CMS:    A&Ox4   Denies any numbness/tingling    Dressing(s):       Diet:    Regular, tolerating well   Equipment:    Hemovac   IV's/Drains:    PIV L hand: infusing LR @ 75mL/hr    Plan:    Continue to monitor, discharge to TCU once medically stable?   Additional Info:    Josie @ bedside today           "

## 2020-01-06 NOTE — CONSULTS
HOSPITALIST CONSULTATION     REQUESTING PHYSICIAN: Wai Mason MD    REASON FOR CONSULTATION: Evaluation, Recommendations and Co-management of Medical Comorbidities.     ASSESSMENT & PLAN :     Jyoti De Jesus is a 89 year old female admitted on 1/4/2020 s/p following procedure:     PREOPERATIVE DIAGNOSIS:  Sacral insufficiency fracture, closed, with instability and possible cauda equina syndrome.        Procedure:      Procedure(s):  OPEN REDUCTION INTERNAL FIXATION, FRACTURE, PELVIS, spinal instrumentation L4-S1, cement augmentation L4-l5, image guided surgery  Surgeon:    * Wai Mason MD - Primary  Anesthesia:     General             Estimated blood loss:  275 mL  Complications:            None.      PMH, Pre Op eval reviewed. Currently doing well. Pain controlled.   No fever or chills. Denies cp or sob. No LH. No NV.   Family at bedside.         # Orthopedic Surgery:     Post Op Management per Primary team.     Hemodynamics: stable. Continue on gentle IV fluids, until adequate PO. Monitor I/o.   Post op capnography per protocol.   Pain control- per Primary team.  Minimize use of narcotics as able. Consider bowel regimen with narcotics.   Encourage Incentive spirometry to prevent atelectasis.  DVT prophylaxis- per Primary team  Activity, antibiotics, wound and/or drain care - per Primary team.     Anemia of acute blood loss: Pre op Hgb 12.0. Monitor hgb for anemia of acute blood loss. Transfuse for hgb <7.0    # Hypothyroidism: Continue PTA Levothyroxine 75 mcg/day  # Glaucoma: Ct PTA eye drops.     No other significant cardiopulmonary problem.     Thank you for the consultation. Please page with any question. Hospitalist team to follow.      Og Reid MD   Hospitalist, Internal Medicine  Pager: [phone].     CHIEF COMPLAINT: Post op. Doing well.     HISTORY OF PRESENT ILLNESS: Obtained from the patient and chart review including Pre op  evaluation, procedure note.    89 year old year old female  with above discussed medical problems s/p above procedure admitted on 1/4/2020  for post op care and monitoring  (for further details for indication of surgery and operative note, please refer to Wai Mason MD note). Medicine consulted to evaluate, recommend and/or co manage medical co morbidities.   No documented hypotension, hypoxemia or other significant complications intra or post operative.   Currently: Incisional Pain controlled. Denies any chest pain, shortness of breath or LH or palpitations. Denies any nausea, vomiting or pain abdomen. No fever or chills. Denies any dysuria.  Denies any new rash.   Medical issues as discussed above.   Denies any other medical concern.     PAST MEDICAL HISTORY:   Reviewed.   Hypothyroidism  Glaucoma.     PAST SURGICAL HISTORY:   History reviewed. No pertinent surgical history.       FH: reviewed.     History reviewed. No pertinent family history.     SH: reviewed.     Social History     Socioeconomic History     Marital status:      Spouse name: None     Number of children: None     Years of education: None     Highest education level: None   Occupational History     None   Social Needs     Financial resource strain: None     Food insecurity:     Worry: None     Inability: None     Transportation needs:     Medical: None     Non-medical: None   Tobacco Use     Smoking status: Former Smoker     Smokeless tobacco: Never Used   Substance and Sexual Activity     Alcohol use: Yes     Comment: daily     Drug use: Not Currently     Sexual activity: None   Lifestyle     Physical activity:     Days per week: None     Minutes per session: None     Stress: None   Relationships     Social connections:     Talks on phone: None     Gets together: None     Attends [employer] service: None     Active member of club or organization: None     Attends meetings of clubs or organizations: None     Relationship status: None      Intimate partner violence:     Fear of current or ex partner: None     Emotionally abused: None     Physically abused: None     Forced sexual activity: None   Other Topics Concern     None   Social History Narrative     None       ALLERGIES:   No Known Allergies      HOME MEDICATIONS:     Prior to Admission medications    Medication Sig Start Date End Date Taking? Authorizing Provider   aspirin 81 MG EC tablet Take 81 mg by mouth daily   Yes Reported, Patient   brimonidine (ALPHAGAN-P) 0.15 % ophthalmic solution Place 2 drops into both eyes 2 times daily   Yes Reported, Patient   calcium acetate (PHOSLO) 667 MG CAPS capsule Take 500 mg by mouth daily   Yes Reported, Patient   cholecalciferol (VITAMIN D3) 5000 units (125 mcg) capsule Take 5,000 Units by mouth daily   Yes Reported, Patient   dorzolamide-timolol (COSOPT) 2-0.5 % ophthalmic solution Place 2 drops into the right eye 2 times daily   Yes Reported, Patient   levothyroxine (SYNTHROID/LEVOTHROID) 75 MCG tablet Take 75 mcg by mouth daily   Yes Reported, Patient   multivitamin w/minerals (MULTI-VITAMIN) tablet Take 1 tablet by mouth daily   Yes Reported, Patient       CURRENT MEDICATIONS:    Current Facility-Administered Medications   Medication     [START ON 1/8/2020] acetaminophen (TYLENOL) tablet 650 mg     acetaminophen (TYLENOL) tablet 975 mg     brimonidine (ALPHAGAN) 0.2 % ophthalmic solution 2 drop     calcium carbonate 500 mg (elemental) (OSCAL;OYSTER SHELL CALCIUM) tablet 500 mg     cholecalciferol (VITAMIN D3) 5000 units (125 mcg) capsule 5,000 Units     dorzolamide-timolol (COSOPT) ophthalmic solution 2 drop     HYDROmorphone (PF) (DILAUDID) injection 0.2 mg     lactated ringers infusion     levothyroxine (SYNTHROID/LEVOTHROID) tablet 75 mcg     lidocaine (LMX4) cream     lidocaine 1 % 1 mL     lidocaine 400 mg in D5W 50 ml (ADULT STD) mixture - for ANALGESIA     melatonin tablet 3 mg     multivitamin w/minerals (THERA-VIT-M) tablet 1 tablet      "naloxone (NARCAN) injection 0.1-0.4 mg     naloxone (NARCAN) injection 0.1-0.4 mg     ondansetron (ZOFRAN-ODT) ODT tab 4 mg    Or     ondansetron (ZOFRAN) injection 4 mg     oxyCODONE (ROXICODONE) tablet 5 mg     polyethylene glycol (MIRALAX/GLYCOLAX) Packet 17 g     senna-docusate (SENOKOT-S/PERICOLACE) 8.6-50 MG per tablet 1-2 tablet     sodium chloride (PF) 0.9% PF flush 3 mL     sodium chloride (PF) 0.9% PF flush 3 mL         ROS: 10 point ROS neg other than the symptoms noted above in the HPI.    PHYSICAL EXAMINATION:     /53 (BP Location: Right arm)   Pulse 84   Temp 97.6  F (36.4  C) (Oral)   Resp 14   Ht 1.594 m (5' 2.75\")   Wt 57.6 kg (127 lb)   SpO2 94%   Breastfeeding No   BMI 22.68 kg/m    Temp (24hrs), Av.9  F (36.6  C), Min:97.6  F (36.4  C), Max:98.5  F (36.9  C)      BMI= Body mass index is 22.68 kg/m .      Intake/Output Summary (Last 24 hours) at 2020  Last data filed at 2020 1900  Gross per 24 hour   Intake 2220 ml   Output 2425 ml   Net -205 ml       General: Alert, interactive, NAD.   HEENT: AT/NC. Anicteric.Moist MM.    Neck: Supple. No LAD   Heart/CVS: Normal S1 and S2. Regular.   Chest/Respi: Non labored breathing. CTA BL.   Abdomen/GI: Soft, non distended, non tender. No g/r.  Extremities/MSK: Distal pulses 2+, well perfused. Rest per ortho.   Neuro: Alert and oriented x4. Cranial nerves II-XII are grossly intact.    Skin:  No new rash over exposed areas.       LABORATORY DATA: reviewed.     Recent Results (from the past 24 hour(s))   CBC with platelets differential    Collection Time: 20 10:00 PM   Result Value Ref Range    WBC 9.3 4.0 - 11.0 10e9/L    RBC Count 3.41 (L) 3.8 - 5.2 10e12/L    Hemoglobin 12.0 11.7 - 15.7 g/dL    Hematocrit 35.7 35.0 - 47.0 %     (H) 78 - 100 fl    MCH 35.2 (H) 26.5 - 33.0 pg    MCHC 33.6 31.5 - 36.5 g/dL    RDW 12.6 10.0 - 15.0 %    Platelet Count 210 150 - 450 10e9/L    Diff Method Automated Method     % " Neutrophils 67.7 %    % Lymphocytes 17.2 %    % Monocytes 10.6 %    % Eosinophils 2.3 %    % Basophils 1.0 %    % Immature Granulocytes 1.2 %    Nucleated RBCs 0 0 /100    Absolute Neutrophil 6.3 1.6 - 8.3 10e9/L    Absolute Lymphocytes 1.6 0.8 - 5.3 10e9/L    Absolute Monocytes 1.0 0.0 - 1.3 10e9/L    Absolute Eosinophils 0.2 0.0 - 0.7 10e9/L    Absolute Basophils 0.1 0.0 - 0.2 10e9/L    Abs Immature Granulocytes 0.1 0 - 0.4 10e9/L    Absolute Nucleated RBC 0.0    ABO/Rh type and screen    Collection Time: 01/04/20 10:00 PM   Result Value Ref Range    ABO O     RH(D) Pos     Antibody Screen Neg     Test Valid Only At          [facility]    Specimen Expires 01/07/2020    INR    Collection Time: 01/04/20 10:00 PM   Result Value Ref Range    INR 1.06 0.86 - 1.14   Comprehensive metabolic panel    Collection Time: 01/04/20 10:00 PM   Result Value Ref Range    Sodium 133 133 - 144 mmol/L    Potassium 4.1 3.4 - 5.3 mmol/L    Chloride 101 94 - 109 mmol/L    Carbon Dioxide 27 20 - 32 mmol/L    Anion Gap 5 3 - 14 mmol/L    Glucose 95 70 - 99 mg/dL    Urea Nitrogen 19 7 - 30 mg/dL    Creatinine 0.92 0.52 - 1.04 mg/dL    GFR Estimate 55 (L) >60 mL/min/[1.73_m2]    GFR Estimate If Black 63 >60 mL/min/[1.73_m2]    Calcium 9.2 8.5 - 10.1 mg/dL    Bilirubin Total 0.5 0.2 - 1.3 mg/dL    Albumin 3.5 3.4 - 5.0 g/dL    Protein Total 7.0 6.8 - 8.8 g/dL    Alkaline Phosphatase 118 40 - 150 U/L    ALT 21 0 - 50 U/L    AST 18 0 - 45 U/L   UA reflex to Microscopic and Culture    Collection Time: 01/05/20  2:30 AM   Result Value Ref Range    Color Urine Light Yellow     Appearance Urine Clear     Glucose Urine Negative NEG^Negative mg/dL    Bilirubin Urine Negative NEG^Negative    Ketones Urine 10 (A) NEG^Negative mg/dL    Specific Gravity Urine 1.008 1.003 - 1.035    Blood Urine Negative NEG^Negative    pH Urine 5.5 5.0 - 7.0 pH    Protein Albumin Urine Negative NEG^Negative mg/dL     Urobilinogen mg/dL Normal 0.0 - 2.0 mg/dL    Nitrite Urine Negative NEG^Negative    Leukocyte Esterase Urine Negative NEG^Negative    Source Catheterized Urine        Recent Results (from the past 24 hour(s))   CT Pelvis Bone wo Contrast    Narrative    EXAM: CT PELVIS BONE WO CONTRAST  LOCATION: [facility]  DATE/TIME: 1/4/2020 9:05 PM    INDICATION: Fall, possible sacrum fracture.  COMPARISON: None.  TECHNIQUE: CT scan of the pelvis was performed without IV contrast. Multiplanar reformats were obtained. Dose reduction techniques were used.  CONTRAST: None.    FINDINGS:  Acute comminuted bilateral sacral alar fractures without significant displacement. The fractures involve the anterior cortex at the level of S1-S2, with extension to the bilateral sacroiliac joints and S1 and S2 neural foramen. Age-indeterminate fracture   at the S2-S3 junction with acute angulation on lateral view.    Acute bilateral L5 transverse process fractures. Age-indeterminate L5 superior endplate compression fracture which involves less than 50% of the vertebral body height. Mild degenerative changes of the lumbar spine.    Normal joint alignment maintained. There is no sacroiliac joint or pubic symphysis widening. Mild bilateral hip and sacroiliac joint degenerative changes. Diffuse osteopenia.    No hematoma. Colonic diverticulosis. Small bowel containing right inguinal hernia. No evidence of bowel obstruction. Atherosclerosis.      Impression    IMPRESSION:  1.  Acute comminuted bilateral sacral alar fractures.  2.  Acute bilateral L5 transverse process fractures.   3.  Age-indeterminate fracture at the S2-S3 junction.  4.  Age-indeterminate L5 vertebral body compression fracture.  5.  Other ancillary findings as described above.     MR Thoracic Spine w/o Contrast    Narrative    EXAM: MR THORACIC SPINE W/O CONTRAST  LOCATION: [facility]  DATE/TIME: 1/5/2020 12:10 AM    INDICATION: Thoracic back pain with  bowel and bladder incontinence.  COMPARISON: None.  TECHNIQUE: Without IV contrast.    FINDINGS:   There is good anatomic alignment of the thoracic alignment with no evidence of subluxation. The vertebral body heights are well-maintained throughout and have appropriate signal characteristics for the patient's age. There is no abnormal signal or change   in size of the thoracic spinal cord. There is no evidence of an intracanal mass or hemorrhage. The paraspinal soft tissues are unremarkable. There is no significant canal compromise or neural foraminal narrowing noted throughout the thoracic region.       Impression    IMPRESSION:  1.  Good anatomic alignment and vertebral body heights maintained.  2.  No abnormal signal within thoracic spinal cord or spinal canal.  3.  No significant canal compromise or neural foraminal narrowing throughout thoracic region.   MR Sacrum and Coccyx w/o Contrast    Narrative    FINAL REPORT    EXAM: MR SACRUM AND COCCYX WITHOUT CONTRAST  LOCATION: [facility]  DATE/TIME: 01/05/2020, 12:51 AM    INDICATION: Sacral fractures. Possible cauda equina syndrome, sacral nerve root injury.  COMPARISON: CT 01/04/2020.  TECHNIQUE: Unenhanced.    FINDINGS:   Acute nondisplaced of the right and left sacral wings with predominant vertical orientation. Associated marrow edema. Confluent transversely oriented fracture extending through the S3 sacral body with apex dorsal angulation. This results in mild   narrowing of the sacral central canal, better demonstrated on the sagittal images from the MRI lumbar spine. There is associated presacral soft tissue edema. No hematoma. Likely incidental perineural cyst along the left S2-S3 sacral nerve roots.    There is marrow edema in the left pubic bone body extending into adjacent inferior and superior pubic rami probably representing a nondisplaced fracture.    Acute fractures of the right and left transverse processes of L5. Chronic superior  endplate compression deformity of L5 vertebral body.    There is likely mild marrow edema involving left lesser trochanter.    Small effusions in the SI joints bilaterally.    Edema in the piriformis muscles related to the sacral fractures. Mild edema deep aspect of both iliacus muscles. Mild edema along the adductor muscles bilaterally. Distended urinary bladder. Small right inguinal hernia containing a short segment of small   bowel.      Impression    IMPRESSION:  1.  Acute fractures of both sacral wings and the S3 sacral body. There is mild narrowing of the sacral central canal due to angulation of the sacral body fracture.  2.  Acute fractures of the transverse processes of L5, and likely the left pubic bone.  3.  No hematoma. No specific evidence for sacral nerve compression.  4.  Mild marrow edema involving the left lesser trochanter could be related to contusion or stress injury.    Final Interpretation Dictated By: Johnny Elaine MD  Date: 01/05/2020       PRELIMINARY REPORT    EXAM: MR SACRUM AND COCCYX W/O CONTRAST  LOCATION: [facility]  DATE/TIME: 1/5/2020 12:51 AM    COMPARISON: None.  TECHNIQUE: Unenhanced.    FINDINGS:   Bilateral sacral ala fractures as seen on CT. Small Tarlov cyst in the left sacrum. No mass or large hematoma. The neural foramina appear widely patent. Trace amount of free fluid in the pelvis.    Preliminary Report Interpreted By: Jorge Sepulveda MD  Date: 1/5/2020     MR Lumbar Spine w/o Contrast    Addendum: 1/5/2020    There is mild edema visualized on the STIR images when the sacrum at the S2-S4 levels.  This may represent an insufficiency fracture of the sacrum.  Please refer to the MR of the sacrum for further information.  There is no discrete mass in the presacral   region.  There is a small amount of fluid anterior to the sacrum nonspecific in nature.    These findings were discussed with Dr. Maya at 2:23 am on 1/5/2020.      Narrative    EXAM: MR LUMBAR  SPINE W/O CONTRAST  LOCATION: [facility]  DATE/TIME: 1/5/2020 12:51 AM    INDICATION: Lumbar back pain along with bowel and bladder incontinence.  COMPARISON: None.  TECHNIQUE: Without IV contrast    FINDINGS:   Nomenclature is based on 5 lumbar type vertebral bodies. There is a slight anterior listhesis of L4 in relation to L5 with the rest of the lumbar spine having good anatomic alignment. The vertebral body heights are well-maintained throughout except for   chronic mild loss of height along the superior endplates of the L2 and L5 vertebral bodies with Schmorl's node formations. Normal distal spinal cord and cauda equina with conus medullaris at T12. There is no abnormal signal or change in size of the conus   medullaris. There is no evidence of an intracanal mass or hemorrhage.. The paraspinal soft tissues are unremarkable. Unremarkable visualized bony pelvis.    T12-L1: Normal disc height and signal. No herniation. Normal facets. No spinal canal or neural foraminal stenosis.     L1-L2: There is a slight loss of disc space height and signal. There is a minimal diffuse annular bulge with no evidence of canal compromise. There is mild facet arthropathy but the neural foramen are patent bilaterally.    The L2-L3 and the L3-L4 disc space levels have a slight loss of disc space height and signal. There are mild diffuse annular bulges more prominent to the posterior lateral regions but there is no evidence of canal compromise. There is mild facet   arthropathy but the lateral recesses and the neural foramen are patent bilaterally.     L3-L4: Normal disc height and signal. No herniation. Normal facets. No spinal canal or neural foraminal stenosis.    L4-L5: There is ballooning of disc space due to Schmorl's node formation. There is a mild diffuse annular bulge but no evidence of canal compromise. There is facet arthropathy but the lateral recesses and the neural foramen are patent bilaterally.    L5-S1:  Normal disc height and signal. No herniation. Normal facets. No spinal canal or neural foraminal stenosis.      Impression    IMPRESSION:  1.  Slight anterior listhesis of L4 in relation to L5.    2.  No significant canal compromise or neural foraminal narrowing throughout cervical region.    3.  No abnormal signal noted within lumbar spinal canal.   XR Surgery BONNIE G/T 5 Min Fluoro    Narrative    This exam was marked as non-reportable because it will not be read by a   radiologist or a [place] non-radiologist provider.                     Og Reid MD

## 2020-01-06 NOTE — PLAN OF CARE
"    VS:   BP 95/54 (BP Location: Left arm)   Pulse 84   Temp 97.7  F (36.5  C) (Oral)   Resp 10   Ht 1.594 m (5' 2.75\")   Wt 57.6 kg (127 lb)   SpO2 99%   Breastfeeding No   BMI 22.68 kg/m      Output:   Hemovac emptied a total of 20ml this this shift.    Pino with very minimal output,house doctor notified at 3AM for an output of 50ml since 11pm,states to continue monitor.@5AM,urine output was only 20ml inspite manipulating catheter for  good flow.House MD order IV bolus 250ml/hr x2 hours    No passing gas yet.Denies nausea.Pt on regular diet.   Activity:   Bedrest overnoc with HOB<30 degrees per ortho.  She will be okay to be up with PT today  per ortho.   Skin: Intact except L lateral hip incision on lower midback incision,UTV,covered with drsgs.R lower forearm has skin tear which was preexisting.   Pain:   Very minimal pain though family and pt okay to be taking oxycodone 5mg atleast 3-4hrs.  Ice machine in use.   Neuro/CMS:   Good strengths on all extremities.No numbness/tingling sensation.   Dressing(s):   Primapore drsg on L lateral hip and Aquacel drsg on lower midback all CDI.   Diet:   Regular diet.   LDA:   PIV line L hand with IV fluid infusing   Equipment:   Incentive spirometry reinforced,reaching up to 1000 level.  PCDs in use overnoc.  Telemonitor,NSR entirety the night.Denying any chest pain.   Plan:   Seen by ortho this morning,okay to get up today with therapy.Aw   Additional Info:         "

## 2020-01-07 ENCOUNTER — APPOINTMENT (OUTPATIENT)
Dept: PHYSICAL THERAPY | Facility: CLINIC | Age: 85
DRG: 516 | End: 2020-01-07
Payer: COMMERCIAL

## 2020-01-07 LAB
CALCIUM SERPL-MCNC: 7.2 MG/DL (ref 8.5–10.1)
DEPRECATED CALCIDIOL+CALCIFEROL SERPL-MC: 41 UG/L (ref 20–75)
GLUCOSE SERPL-MCNC: 109 MG/DL (ref 70–99)
MAGNESIUM SERPL-MCNC: 1.9 MG/DL (ref 1.6–2.3)
PHOSPHATE SERPL-MCNC: 2.3 MG/DL (ref 2.5–4.5)
PTH-INTACT SERPL-MCNC: 33 PG/ML (ref 18–80)

## 2020-01-07 PROCEDURE — 25000132 ZZH RX MED GY IP 250 OP 250 PS 637: Performed by: STUDENT IN AN ORGANIZED HEALTH CARE EDUCATION/TRAINING PROGRAM

## 2020-01-07 PROCEDURE — 97530 THERAPEUTIC ACTIVITIES: CPT | Mod: GP

## 2020-01-07 PROCEDURE — 83735 ASSAY OF MAGNESIUM: CPT | Performed by: CLINICAL NURSE SPECIALIST

## 2020-01-07 PROCEDURE — 84100 ASSAY OF PHOSPHORUS: CPT | Performed by: CLINICAL NURSE SPECIALIST

## 2020-01-07 PROCEDURE — 25000125 ZZHC RX 250: Performed by: INTERNAL MEDICINE

## 2020-01-07 PROCEDURE — 99232 SBSQ HOSP IP/OBS MODERATE 35: CPT | Performed by: INTERNAL MEDICINE

## 2020-01-07 PROCEDURE — 82306 VITAMIN D 25 HYDROXY: CPT | Performed by: CLINICAL NURSE SPECIALIST

## 2020-01-07 PROCEDURE — 97110 THERAPEUTIC EXERCISES: CPT | Mod: GP

## 2020-01-07 PROCEDURE — 82310 ASSAY OF CALCIUM: CPT | Performed by: CLINICAL NURSE SPECIALIST

## 2020-01-07 PROCEDURE — 97162 PT EVAL MOD COMPLEX 30 MIN: CPT | Mod: GP

## 2020-01-07 PROCEDURE — 25800030 ZZH RX IP 258 OP 636: Performed by: STUDENT IN AN ORGANIZED HEALTH CARE EDUCATION/TRAINING PROGRAM

## 2020-01-07 PROCEDURE — 25800030 ZZH RX IP 258 OP 636: Performed by: INTERNAL MEDICINE

## 2020-01-07 PROCEDURE — 12000001 ZZH R&B MED SURG/OB UMMC

## 2020-01-07 PROCEDURE — 82947 ASSAY GLUCOSE BLOOD QUANT: CPT | Performed by: INTERNAL MEDICINE

## 2020-01-07 PROCEDURE — 36415 COLL VENOUS BLD VENIPUNCTURE: CPT | Performed by: CLINICAL NURSE SPECIALIST

## 2020-01-07 PROCEDURE — 25000132 ZZH RX MED GY IP 250 OP 250 PS 637: Performed by: INTERNAL MEDICINE

## 2020-01-07 PROCEDURE — 83970 ASSAY OF PARATHORMONE: CPT | Performed by: CLINICAL NURSE SPECIALIST

## 2020-01-07 PROCEDURE — 82947 ASSAY GLUCOSE BLOOD QUANT: CPT | Performed by: CLINICAL NURSE SPECIALIST

## 2020-01-07 RX ORDER — CALCIUM CARBONATE 500(1250)
500 TABLET ORAL
Status: DISCONTINUED | OUTPATIENT
Start: 2020-01-07 | End: 2020-01-14 | Stop reason: HOSPADM

## 2020-01-07 RX ORDER — FAMOTIDINE 10 MG
20 TABLET ORAL EVERY 24 HOURS
Status: DISCONTINUED | OUTPATIENT
Start: 2020-01-08 | End: 2020-01-14 | Stop reason: HOSPADM

## 2020-01-07 RX ADMIN — DORZOLAMIDE HYDROCHLORIDE AND TIMOLOL MALEATE 2 DROP: 20; 5 SOLUTION/ DROPS OPHTHALMIC at 08:37

## 2020-01-07 RX ADMIN — OXYCODONE HYDROCHLORIDE 5 MG: 5 TABLET ORAL at 19:27

## 2020-01-07 RX ADMIN — ACETAMINOPHEN 975 MG: 325 TABLET, FILM COATED ORAL at 04:56

## 2020-01-07 RX ADMIN — CALCIUM 500 MG: 500 TABLET ORAL at 10:40

## 2020-01-07 RX ADMIN — ACETAMINOPHEN 975 MG: 325 TABLET, FILM COATED ORAL at 19:28

## 2020-01-07 RX ADMIN — OXYCODONE HYDROCHLORIDE 5 MG: 5 TABLET ORAL at 01:16

## 2020-01-07 RX ADMIN — SENNOSIDES AND DOCUSATE SODIUM 2 TABLET: 8.6; 5 TABLET ORAL at 19:39

## 2020-01-07 RX ADMIN — ACETAMINOPHEN 975 MG: 325 TABLET, FILM COATED ORAL at 12:18

## 2020-01-07 RX ADMIN — BRIMONIDINE TARTRATE 2 DROP: 2 SOLUTION/ DROPS OPHTHALMIC at 19:48

## 2020-01-07 RX ADMIN — POTASSIUM PHOSPHATE, MONOBASIC AND POTASSIUM PHOSPHATE, DIBASIC 15 MMOL: 224; 236 INJECTION, SOLUTION INTRAVENOUS at 10:50

## 2020-01-07 RX ADMIN — SODIUM CHLORIDE 85 ML/HR: 9 INJECTION, SOLUTION INTRAVENOUS at 17:32

## 2020-01-07 RX ADMIN — CALCIUM 500 MG: 500 TABLET ORAL at 17:20

## 2020-01-07 RX ADMIN — BRIMONIDINE TARTRATE 2 DROP: 2 SOLUTION/ DROPS OPHTHALMIC at 08:38

## 2020-01-07 RX ADMIN — OXYCODONE HYDROCHLORIDE 5 MG: 5 TABLET ORAL at 15:23

## 2020-01-07 RX ADMIN — SODIUM CHLORIDE: 9 INJECTION, SOLUTION INTRAVENOUS at 01:18

## 2020-01-07 RX ADMIN — DORZOLAMIDE HYDROCHLORIDE AND TIMOLOL MALEATE 2 DROP: 20; 5 SOLUTION/ DROPS OPHTHALMIC at 19:48

## 2020-01-07 RX ADMIN — POLYETHYLENE GLYCOL 3350 17 G: 17 POWDER, FOR SOLUTION ORAL at 08:42

## 2020-01-07 RX ADMIN — SENNOSIDES AND DOCUSATE SODIUM 2 TABLET: 8.6; 5 TABLET ORAL at 08:40

## 2020-01-07 RX ADMIN — LEVOTHYROXINE SODIUM 75 MCG: 25 TABLET ORAL at 08:40

## 2020-01-07 RX ADMIN — Medication 5000 UNITS: at 08:40

## 2020-01-07 RX ADMIN — OXYCODONE HYDROCHLORIDE 5 MG: 5 TABLET ORAL at 08:35

## 2020-01-07 RX ADMIN — OXYCODONE HYDROCHLORIDE 5 MG: 5 TABLET ORAL at 22:57

## 2020-01-07 RX ADMIN — MULTIPLE VITAMINS W/ MINERALS TAB 1 TABLET: TAB at 08:41

## 2020-01-07 RX ADMIN — OXYCODONE HYDROCHLORIDE 5 MG: 5 TABLET ORAL at 04:56

## 2020-01-07 RX ADMIN — FAMOTIDINE 20 MG: 10 TABLET, COATED ORAL at 08:40

## 2020-01-07 ASSESSMENT — ACTIVITIES OF DAILY LIVING (ADL)
ADLS_ACUITY_SCORE: 16
ADLS_ACUITY_SCORE: 16
ADLS_ACUITY_SCORE: 15
ADLS_ACUITY_SCORE: 15
ADLS_ACUITY_SCORE: 16
ADLS_ACUITY_SCORE: 15

## 2020-01-07 NOTE — PLAN OF CARE
Pt A&O x's 4. VSS. Afebrile. 02 sats in the 90s on 1 LPM. Lungs clear. Denies SOB, CP and nausea. Tolerating regular diet. Bowel sound active in all quadrants. No BM but passing gas. Pino in and draining well. Pain well managed with  current pain regimen CMS intact, denies N/T.back dressing clean dry and intact. Hemovac drained 5cc. PIV patent and infusing.Pt slept between care and is able to make needs known, call light with in reach. Will continue to monitor.

## 2020-01-07 NOTE — PLAN OF CARE
Pt A&O x's 4. VSS. Afebrile. 02 sats in the 90s on RA.  Lungs clear. Denies SOB, CP and nausea. Tolerating regular diet. Bowel sound active in all quadrants. No BM but passing gas. Voiding into the bed pan. Pt encouraged to get up to the commode/ toilet.  Pain is tolerable , pt declined pain meds. CMS intact, denies N/T. Right are cast clean, dry and intact. Pt using sling. Able to wiggle fingers.  Pt slept between care and is .able to make needs known, call light with in reach. Will continue to monitor.

## 2020-01-07 NOTE — PROGRESS NOTES
Orthopaedic Surgery Progress Note 01/07/2020    E: No acute events overnight.    S: POD2. Pain controlled. Denies numbness or tingling. Denies cp/sob/n/v. Tolerating oral diet. -BM +flatus. Voiding via last. Plan of care reviewed and questions answered.    O:  Temp: 97.7  F (36.5  C) Temp src: Oral BP: 107/54   Heart Rate: 77 Resp: 13 SpO2: 99 % O2 Device: Nasal cannula Oxygen Delivery: 2 LPM    Exam:  Gen: NAD. Resting comfortably in bed  Resp: Breathing comfortably on RA  Drain:   Superficial drain output 50/5/5 ml serosanguinous last 3 shifts    Musculoskeletal: Dressing is c/d/i      Lower extremities:  Motor Strength Right Left   Hip flexion: L1, L2, L3 4/5 4/5   Hip adduction: L2, L3 5/5 5/5   Knee flexion: S1 5/5 5/5   Knee extension: L3, L4 5/5 5/5   Ankle dosiflexion: L4, L5 5/5 5/5   EHL: L5 4/5 4/5   Ankle plantarflexion: S1 5/5 5/5      Sensation from L1-S2 is preserved.      Recent Labs   Lab 01/06/20  0811 01/04/20  2200   WBC 8.0 9.3   HGB 8.2* 12.0   * 210       Assessment and Plan: Jyoti De Jesus is a 89 year old female now s/p spino-pelvic fixation on 1/5/2020 with Dr. Mason.      Goals Today:  - Discontinue drain  - Discontinue Last  - PT/OT  - X-rays (AP/inlet/outlet/latera) when tolerated    Orthopedics Primary  Activity: No HOB restrictions.  Bed to chair only x3 days.  Then up with assistive device.  No excessive bending or twisting. No lifting >10 lbs x 6 weeks. No Juan lift for transfers.   Weight bearing status: Protected WB.  Pain management: Transition from IV to PO as tolerated. No NSAIDs.   Antibiotics: Ancef x24 hours - completed.  Diet: Begin with clear fluids and progress diet as tolerated.   DVT prophylaxis: SCDs only. No chemical DVT ppx needed.  Imaging: XR Upright PTDC - ordered.  Labs: Labs PRN.  Bracing/Splinting: None.  Dressings: Keep Aquacel c/d/i x 7 days.  Drains: Document output per shift, will be discontinued at Orthopedic Surgery discretion.  Last  catheter: Remove POD#2-3.   Physical Therapy/Occupational Therapy: Eval and treat.  Cultures: None.  Consults: Hospitalist.  Follow-up: Clinic with Dr. Mason in 6 weeks with repeat x-rays.   Disposition: Pending progress with therapies, pain control on orals, and medical stability, anticipate discharge to rehab vs home on POD #~7.    Future Appointments   Date Time Provider Department Center   1/7/2020  9:15 AM Veronica Perez, SREE [facility] [place]   1/7/2020  3:45 PM Ngoc Melendez PT [facility] [place]   1/8/2020  8:45 AM Yecenia Silva OT [facility]       Patient discussed with Dr. Isabella Tijerina MD, PhD  Orthopedic Surgery PGY4  Pager [phone]    Please page me directly with any questions/concerns during regular weekday hours before 5pm. If there is no response, if it is a weekend, or if it is during evening hours then please page the orthopaedic surgery resident on call.

## 2020-01-07 NOTE — PROGRESS NOTES
"Social Work Services Progress Note    Hospital Day: 4  Collaborated with:  Pt, pt spouse Abdelrahman, medical team    Data:  Pt is a 89 year old female admitted on 1/4/2020 s/p following ORIF of fracture pelvis and spinal instrumentation L4-S1, cement augmentation L4-l5.    Intervention:  Per medical team, pt will most likely need TCU. Therapy notes reflect a duo recommendation currently home with assist vs TCU.    SW met with pt and spouse to discuss discharge planning. Pt goes by \"Pat.\" Pt spouse, Abdelrahman, present at bedside-supportive of wife. Pt and spouse agreeable to TCU if recommended.     Pt/family was given the Medicare Compare list for SNF, with associated star ratings to assist with choice for referrals/discharge planning. Education was given to pt/family that star ratings are updated/maintained by Medicare and can be reviewed by visiting www.medicare.gov.    Pt and spouse will review possible preferences with their daughter. SW to collect preferences tomorrow or 1/9.     Assessment:  Pt and spouse agreeable to TCU if needed    Plan:    Anticipated Disposition:  Facility:  TCU v home with assist    Barriers to d/c plan:  Medical stability     Follow Up:  SW to continue following for discharge planning    YUDI Corral  5A/10A Ortho/Med/Surg & [facility] [place] [facility]  Phone: [phone] Pager: [phone]        "

## 2020-01-07 NOTE — PLAN OF CARE
Discharge Planner PT   Patient plan for discharge: TBD  Current status: PT evaluation completed and treatment initiated.  Pt educated on spinal precautions, use of log roll, and bed<>chair per ortho till POD 3.  Pt needing Lico for bed mobility and CGA with walker for transfer to chair.  Continue to encourage sitting up in chair.  Pt lives at home with  in single level condo, has family around to assist as well.  Barriers to return to prior living situation: Need for increased assist with bed mobility and ambulation  Recommendations for discharge: Home with assist vs TCU  Rationale for recommendations: Pending progress with therapies       Entered by: Veronica Perez 01/07/2020 9:44 AM

## 2020-01-07 NOTE — PROGRESS NOTES
01/07/20 0900   Quick Adds   Type of Visit Initial PT Evaluation   Living Environment   Lives With spouse   Living Arrangements condominium   Home Accessibility no concerns   Transportation Anticipated family or friend will provide   Living Environment Comment Pt lives in single level condo with no stairs to enter,  lives ther with her, daughter lives close by.    Self-Care   Usual Activity Tolerance good   Current Activity Tolerance moderate   Regular Exercise No   Equipment Currently Used at Home cane, straight;walker, rolling;shower chair   Activity/Exercise/Self-Care Comment Per pt prior to her fall in December she was IND with all mobility and ADL's.  Since then has been using a shower chair and walker for mobility due to weakness/pain.   Was driving and completing house hold chores/errands.    Functional Level Prior   Ambulation 1-->assistive equipment   Transferring 1-->assistive equipment   Toileting 0-->independent   Bathing 1-->assistive equipment   Communication 0-->understands/communicates without difficulty   Swallowing 0-->swallows foods/liquids without difficulty   Cognition 0 - no cognition issues reported   Fall history within last six months yes   Number of times patient has fallen within last six months 1   Which of the above functional risks had a recent onset or change? ambulation;transferring   Prior Functional Level Comment Pt was IND using a walker more recently.    General Information   Onset of Illness/Injury or Date of Surgery - Date 01/04/20   Referring Physician Rudolph Mukherjee MD   Patient/Family Goals Statement Pt wants to get stronger.    Pertinent History of Current Problem (include personal factors and/or comorbidities that impact the POC) 89 year old female now s/p spino-pelvic fixation on 1/5/2020 with Dr. Mason.    Precautions/Limitations fall precautions;spinal precautions   Weight-Bearing Status - LUE full weight-bearing   Weight-Bearing Status - RUE full  weight-bearing   Weight-Bearing Status - LLE full weight-bearing   Weight-Bearing Status - RLE full weight-bearing   General Observations On RA, bed<>chair only till POD #3, last, MATT   General Info Comments Activit: up in chair   Cognitive Status Examination   Orientation orientation to person, place and time   Level of Consciousness alert   Follows Commands and Answers Questions 100% of the time   Personal Safety and Judgment intact   Memory intact   Pain Assessment   Patient Currently in Pain Yes, see Vital Sign flowsheet  (Pain well controlled)   Integumentary/Edema   Integumentary/Edema Comments Incision covered with bandage, drain   Posture    Posture Not impaired   Range of Motion (ROM)   ROM Comment ROM WFL   Strength   Strength Comments B LE's grossly 4+/5   Bed Mobility   Bed Mobility Comments Cielo for bed mob with use of log roll   Transfer Skills   Transfer Comments CGA with use of walker   Gait   Gait Comments Able to take steps from bed>chair with walker and CGA   Balance   Balance Comments Impaired standing balance, need for AD; IND with sitting balance   Sensory Examination   Sensory Perception no deficits were identified   Coordination   Coordination no deficits were identified   Muscle Tone   Muscle Tone no deficits were identified   General Therapy Interventions   Planned Therapy Interventions balance training;bed mobility training;gait training;strengthening;transfer training;risk factor education;home program guidelines;progressive activity/exercise   Clinical Impression   Criteria for Skilled Therapeutic Intervention yes, treatment indicated   PT Diagnosis Impaired functional mobility   Influenced by the following impairments Decreased LE strength, impaired balance   Functional limitations due to impairments Inability to complete functional mobility at baseline level of IND functioning   Clinical Presentation Stable/Uncomplicated   Clinical Presentation Rationale Medically stable, on RA  "  Clinical Decision Making (Complexity) Moderate complexity   Therapy Frequency 2x/day   Predicted Duration of Therapy Intervention (days/wks) 5 days   Anticipated Equipment Needs at Discharge   (Has walker, cane and shower chair at home )   Anticipated Discharge Disposition Home with Home Therapy;Transitional Care Facility   Risk & Benefits of therapy have been explained Yes   Patient, Family & other staff in agreement with plan of care Yes   Clinical Impression Comments Pt would benefit from IP PT to progress strength and IND with functional mobility within precautions.    [facility] AM-PAC TM \"6 Clicks\"   2016, Trustees of [facility], under license to [employer].  All rights reserved.   6 Clicks Short Forms Basic Mobility Inpatient Short Form   [facility] AM-PAC  \"6 Clicks\" V.2 Basic Mobility Inpatient Short Form   1. Turning from your back to your side while in a flat bed without using bedrails? 3 - A Little   2. Moving from lying on your back to sitting on the side of a flat bed without using bedrails? 3 - A Little   3. Moving to and from a bed to a chair (including a wheelchair)? 3 - A Little   4. Standing up from a chair using your arms (e.g., wheelchair, or bedside chair)? 3 - A Little   5. To walk in hospital room? 3 - A Little   6. Climbing 3-5 steps with a railing? 2 - A Lot   Basic Mobility Raw Score (Score out of 24.Lower scores equate to lower levels of function) 17   Total Evaluation Time   Total Evaluation Time (Minutes) 10     "

## 2020-01-07 NOTE — PLAN OF CARE
"VS:    /59 (BP Location: Left arm)   Pulse 78   Temp 99.1  F (37.3  C) (Oral)   Resp 18   Ht 1.594 m (5' 2.75\")   Wt 57.6 kg (127 lb)   SpO2 97%   Breastfeeding No   BMI 22.68 kg/m    CSS on RA    Output: Urethral catheter: patent.  Last BM 1/2 per pt report. Passing flatus      Lungs: On RA, LS clear/diminished at bases. No sob/cp    Activity: BED TO CHAIR ONLY- Pivot to chair/commode Ax1   Skin: Intact ex skin tear R arm, hemovac drain and incisions   Pain: Incisional, L side. Pt states pain has been minimal- using scheduled Tylenol and occasional 5mg oxycodone    Neuro/CMS: AOx4, denies N/T. CMS intact.    Dressing(s): L lateral hip primapore and mid lower back aquacel CDI    Diet: Regular, tolerating PO intake well.   Nausea with emesis X1 this shift- denied zofran intervention    LDA: PIV L hand infusing NS at 85 mL/hr, Hemovac, last.   Equipment: PCDs, IV pole, hemovac, IS, spine chair, commode,walker   Plan: Continue POC, Able to make needs known. Call light within reach.   Plan to discharge to TCU when medically stable    Additional Info: Family at bedside.     Replaced Phosphorus this shift- recheck AM 1/8/2020      "

## 2020-01-07 NOTE — PLAN OF CARE
"  VS:   /54 (BP Location: Left arm)   Pulse 84   Temp 97.7  F (36.5  C) (Oral)   Resp 13   Ht 1.594 m (5' 2.75\")   Wt 57.6 kg (127 lb)   SpO2 99%   Breastfeeding No   BMI 22.68 kg/m     Output: Voiding in last. 900 mL clear yellow out. Last BM 1/3. +gas.    Lungs: >94% on 1.5 LPM NC. Tried to wean off but sats drop to 82-85%   Activity: Pivot to chair/commode AO2, reposition in bed with AO1   Skin: Intact ex skin tear R arm, hemovac drain and incisions   Pain: Incisional, L side. Pt states pain control has been good this shift with scheduled tylenol and PRN 5mg Oxy.   Neuro/CMS: AOx4, denies N/T. Pulses +2, sensation intact   Dressing(s): L lateral hip primapore and mid lower back aquacel c/d/i   Diet: Regular, tolerating PO intake well.    LDA: PIV L hand infusing NS at 85 mL/hr, Hemovac, last.   Equipment: PCDs, IV pole, hemovac drain, spine chair, commode, last, walker   Plan: Possible transfer to TCU.   Additional Info: Family visiting today, daughter in room overnight     "

## 2020-01-07 NOTE — PROGRESS NOTES
"[facility], [place]   Internal Medicine Daily Note           Interval History/Events     Hand off taken from Dr. Reid  Overnight events reviewed  Reports doing well  Pain controlled  No chest pain, shortness of breath  Had BM  No fever,chills         Review of Systems        4 point ROS including Respiratory, CV, GI and , other than that noted above is negative      Medications   I have reviewed current medications  in the \"current medication\" section of Epic.  Relevant changes include:     Physical Exam   General:       Vital signs:    Blood pressure 115/62, pulse 70, temperature 98.8  F (37.1  C), temperature source Oral, resp. rate 18, height 1.594 m (5' 2.75\"), weight 57.6 kg (127 lb), SpO2 98 %, not currently breastfeeding.  Estimated body mass index is 22.68 kg/m  as calculated from the following:    Height as of this encounter: 1.594 m (5' 2.75\").    Weight as of this encounter: 57.6 kg (127 lb).      Intake/Output Summary (Last 24 hours) at 1/7/2020 1233  Last data filed at 1/7/2020 0941  Gross per 24 hour   Intake 360 ml   Output 2370 ml   Net -2010 ml      HEENT: No icterus, no pallor  Cardiovascular: S1, S2 normal  Respiratory:  B/L CTA  GI/Abdomen: Soft, NT, BS+  Neurology: Alert, awake,and oriented. No tremors.   Extremities: No pretibial edema.   Skin:      Laboratory and Imaging Studies     I have reviewed  laboratory and imaging studies in the Epic. Pertinent findings are as below:    BMP  Recent Labs   Lab 01/07/20  0558 01/06/20  0811 01/04/20  2200   NA  --  136 133   POTASSIUM  --  4.1 4.1   CHLORIDE  --  104 101   SAMMIE 7.2* 7.3* 9.2   CO2  --  21 27   BUN  --  15 19   CR  --  0.73 0.92   GLC  --  89 95     CBC  Recent Labs   Lab 01/06/20  0811 01/04/20  2200   WBC 8.0 9.3   RBC 2.36* 3.41*   HGB 8.2* 12.0   HCT 25.5* 35.7   * 105*   MCH 34.7* 35.2*   MCHC 32.2 33.6   RDW 12.8 12.6   * 210     INR  Recent Labs   Lab 01/04/20  2200   INR 1.06 "     LFTs  Recent Labs   Lab 01/04/20  2200   ALKPHOS 118   AST 18   ALT 21   BILITOTAL 0.5   PROTTOTAL 7.0   ALBUMIN 3.5      PANCNo lab results found in last 7 days.        Impression/Plan          89 year old female admitted on 1/4/2020 s/p following ORIF of fracture pelvis and spinal instrumentation L4-S1, cement augmentation L4-l5    She has a known H/O Hypothyroidism and glaucoma.   Internal medicine consulted for post operative co management.  Individual problems and their management are outlined below     # s/p  ORIF of fracture pelvis and spinal instrumentation L4-S1, cement augmentation L4-L5 on 01/05/2020  EBL at 275 ml.  Management primarily per Ortho team.  - Wound cares, Dressings, Surgical pain management, DVT Prophylaxis  per primary team.   - Monitor anemia, hemodynamics, Input/Output, Capnography (for 24 hours)  - Encourage Incentive spirometry  - Laxatives for constipation prophylaxis     # Anemia: Most likely due to Hemodilution, blood loss, and post surgical inflammation.  Hg 8.2 gm/dl on 01/07/2020  Asymptomatic   - Transfuse if Hg < 7 gm/dl      # Hypothyroidism:   Continue PTA Levothyroxine 75 mcg/day     # Glaucoma:   Ct PTA eye drops.     # Hypocalcemia: Albumin normal. Calcium 7.2 on 01/07.   Increase Calcium to TID from BID    # Hypophosphatemia: level 2.3  - Replace per protocol ordered    # Thrombocytopenia: most likely d/t surgery  - monitor     # Diet: Regular Diet Adult    # DVT Prophylaxis: Pneumatic Compression Devices  # Pino Catheter: in place, indication: Wound Healing  # Code Status: Full Code         Pt's care was discussed with bedside RN, patient and  during Care Team Rounds.               Roger Gomez MD  Hospitalist ( Internal medicine)  Pager: [phone]

## 2020-01-08 ENCOUNTER — APPOINTMENT (OUTPATIENT)
Dept: GENERAL RADIOLOGY | Facility: CLINIC | Age: 85
DRG: 516 | End: 2020-01-08
Attending: CLINICAL NURSE SPECIALIST
Payer: COMMERCIAL

## 2020-01-08 ENCOUNTER — APPOINTMENT (OUTPATIENT)
Dept: PHYSICAL THERAPY | Facility: CLINIC | Age: 85
DRG: 516 | End: 2020-01-08
Payer: COMMERCIAL

## 2020-01-08 ENCOUNTER — APPOINTMENT (OUTPATIENT)
Dept: OCCUPATIONAL THERAPY | Facility: CLINIC | Age: 85
DRG: 516 | End: 2020-01-08
Payer: COMMERCIAL

## 2020-01-08 LAB
ANION GAP SERPL CALCULATED.3IONS-SCNC: 5 MMOL/L (ref 3–14)
BASOPHILS # BLD AUTO: 0 10E9/L (ref 0–0.2)
BASOPHILS NFR BLD AUTO: 0.5 %
BUN SERPL-MCNC: 14 MG/DL (ref 7–30)
CALCIUM SERPL-MCNC: 8 MG/DL (ref 8.5–10.1)
CHLORIDE SERPL-SCNC: 107 MMOL/L (ref 94–109)
CO2 SERPL-SCNC: 25 MMOL/L (ref 20–32)
CREAT SERPL-MCNC: 0.85 MG/DL (ref 0.52–1.04)
DIFFERENTIAL METHOD BLD: ABNORMAL
EOSINOPHIL # BLD AUTO: 0.1 10E9/L (ref 0–0.7)
EOSINOPHIL NFR BLD AUTO: 1.5 %
ERYTHROCYTE [DISTWIDTH] IN BLOOD BY AUTOMATED COUNT: 12.8 % (ref 10–15)
GFR SERPL CREATININE-BSD FRML MDRD: 60 ML/MIN/{1.73_M2}
GLUCOSE SERPL-MCNC: 90 MG/DL (ref 70–99)
HCT VFR BLD AUTO: 26.1 % (ref 35–47)
HGB BLD-MCNC: 8.5 G/DL (ref 11.7–15.7)
IMM GRANULOCYTES # BLD: 0.1 10E9/L (ref 0–0.4)
IMM GRANULOCYTES NFR BLD: 1.5 %
LYMPHOCYTES # BLD AUTO: 1.4 10E9/L (ref 0.8–5.3)
LYMPHOCYTES NFR BLD AUTO: 16.1 %
MCH RBC QN AUTO: 35.3 PG (ref 26.5–33)
MCHC RBC AUTO-ENTMCNC: 32.6 G/DL (ref 31.5–36.5)
MCV RBC AUTO: 108 FL (ref 78–100)
MONOCYTES # BLD AUTO: 0.7 10E9/L (ref 0–1.3)
MONOCYTES NFR BLD AUTO: 8.4 %
NEUTROPHILS # BLD AUTO: 6.1 10E9/L (ref 1.6–8.3)
NEUTROPHILS NFR BLD AUTO: 72 %
NRBC # BLD AUTO: 0 10*3/UL
NRBC BLD AUTO-RTO: 0 /100
PHOSPHATE SERPL-MCNC: 2.1 MG/DL (ref 2.5–4.5)
PLATELET # BLD AUTO: 150 10E9/L (ref 150–450)
POTASSIUM SERPL-SCNC: 4.2 MMOL/L (ref 3.4–5.3)
RBC # BLD AUTO: 2.41 10E12/L (ref 3.8–5.2)
SODIUM SERPL-SCNC: 137 MMOL/L (ref 133–144)
WBC # BLD AUTO: 8.5 10E9/L (ref 4–11)

## 2020-01-08 PROCEDURE — 97535 SELF CARE MNGMENT TRAINING: CPT | Mod: GO | Performed by: OCCUPATIONAL THERAPIST

## 2020-01-08 PROCEDURE — 25000132 ZZH RX MED GY IP 250 OP 250 PS 637: Performed by: STUDENT IN AN ORGANIZED HEALTH CARE EDUCATION/TRAINING PROGRAM

## 2020-01-08 PROCEDURE — 97530 THERAPEUTIC ACTIVITIES: CPT | Mod: GP | Performed by: PHYSICAL THERAPIST

## 2020-01-08 PROCEDURE — 99232 SBSQ HOSP IP/OBS MODERATE 35: CPT | Performed by: INTERNAL MEDICINE

## 2020-01-08 PROCEDURE — 72100 X-RAY EXAM L-S SPINE 2/3 VWS: CPT

## 2020-01-08 PROCEDURE — 97110 THERAPEUTIC EXERCISES: CPT | Mod: GP | Performed by: PHYSICAL THERAPIST

## 2020-01-08 PROCEDURE — 36415 COLL VENOUS BLD VENIPUNCTURE: CPT | Performed by: INTERNAL MEDICINE

## 2020-01-08 PROCEDURE — 97165 OT EVAL LOW COMPLEX 30 MIN: CPT | Mod: GO | Performed by: OCCUPATIONAL THERAPIST

## 2020-01-08 PROCEDURE — 12000001 ZZH R&B MED SURG/OB UMMC

## 2020-01-08 PROCEDURE — 85025 COMPLETE CBC W/AUTO DIFF WBC: CPT | Performed by: INTERNAL MEDICINE

## 2020-01-08 PROCEDURE — 25000132 ZZH RX MED GY IP 250 OP 250 PS 637: Performed by: INTERNAL MEDICINE

## 2020-01-08 PROCEDURE — 84100 ASSAY OF PHOSPHORUS: CPT | Performed by: INTERNAL MEDICINE

## 2020-01-08 PROCEDURE — 80048 BASIC METABOLIC PNL TOTAL CA: CPT | Performed by: INTERNAL MEDICINE

## 2020-01-08 RX ADMIN — OXYCODONE HYDROCHLORIDE 5 MG: 5 TABLET ORAL at 06:46

## 2020-01-08 RX ADMIN — CALCIUM 500 MG: 500 TABLET ORAL at 09:02

## 2020-01-08 RX ADMIN — BRIMONIDINE TARTRATE 2 DROP: 2 SOLUTION/ DROPS OPHTHALMIC at 09:05

## 2020-01-08 RX ADMIN — CALCIUM 500 MG: 500 TABLET ORAL at 17:45

## 2020-01-08 RX ADMIN — POTASSIUM & SODIUM PHOSPHATES POWDER PACK 280-160-250 MG 1 PACKET: 280-160-250 PACK at 13:20

## 2020-01-08 RX ADMIN — ACETAMINOPHEN 650 MG: 325 TABLET, FILM COATED ORAL at 16:59

## 2020-01-08 RX ADMIN — OXYCODONE HYDROCHLORIDE 5 MG: 5 TABLET ORAL at 20:08

## 2020-01-08 RX ADMIN — BISACODYL 10 MG: 10 SUPPOSITORY RECTAL at 16:59

## 2020-01-08 RX ADMIN — DORZOLAMIDE HYDROCHLORIDE AND TIMOLOL MALEATE 2 DROP: 20; 5 SOLUTION/ DROPS OPHTHALMIC at 20:09

## 2020-01-08 RX ADMIN — LEVOTHYROXINE SODIUM 75 MCG: 25 TABLET ORAL at 11:00

## 2020-01-08 RX ADMIN — POTASSIUM & SODIUM PHOSPHATES POWDER PACK 280-160-250 MG 1 PACKET: 280-160-250 PACK at 08:59

## 2020-01-08 RX ADMIN — SENNOSIDES AND DOCUSATE SODIUM 2 TABLET: 8.6; 5 TABLET ORAL at 17:45

## 2020-01-08 RX ADMIN — MULTIPLE VITAMINS W/ MINERALS TAB 1 TABLET: TAB at 17:45

## 2020-01-08 RX ADMIN — DORZOLAMIDE HYDROCHLORIDE AND TIMOLOL MALEATE 2 DROP: 20; 5 SOLUTION/ DROPS OPHTHALMIC at 08:57

## 2020-01-08 RX ADMIN — Medication 5000 UNITS: at 09:02

## 2020-01-08 RX ADMIN — POTASSIUM & SODIUM PHOSPHATES POWDER PACK 280-160-250 MG 1 PACKET: 280-160-250 PACK at 20:37

## 2020-01-08 RX ADMIN — CALCIUM 500 MG: 500 TABLET ORAL at 13:19

## 2020-01-08 RX ADMIN — POLYETHYLENE GLYCOL 3350 17 G: 17 POWDER, FOR SOLUTION ORAL at 11:00

## 2020-01-08 RX ADMIN — BRIMONIDINE TARTRATE 2 DROP: 2 SOLUTION/ DROPS OPHTHALMIC at 20:09

## 2020-01-08 RX ADMIN — ACETAMINOPHEN 975 MG: 325 TABLET, FILM COATED ORAL at 02:56

## 2020-01-08 RX ADMIN — OXYCODONE HYDROCHLORIDE 5 MG: 5 TABLET ORAL at 02:58

## 2020-01-08 RX ADMIN — ACETAMINOPHEN 975 MG: 325 TABLET, FILM COATED ORAL at 11:00

## 2020-01-08 RX ADMIN — SENNOSIDES AND DOCUSATE SODIUM 2 TABLET: 8.6; 5 TABLET ORAL at 09:02

## 2020-01-08 ASSESSMENT — ACTIVITIES OF DAILY LIVING (ADL)
ADLS_ACUITY_SCORE: 12
ADLS_ACUITY_SCORE: 15
ADLS_ACUITY_SCORE: 12
ADLS_ACUITY_SCORE: 15

## 2020-01-08 NOTE — PROGRESS NOTES
01/08/20 1425   Quick Adds   Type of Visit Initial Occupational Therapy Evaluation   Living Environment   Lives With spouse   Living Arrangements condominium   Home Accessibility no concerns   Transportation Anticipated car, drives self;family or friend will provide   Living Environment Comment Lives in single level condo; has walk-in shower, shower chair and higher toilets   Self-Care   Usual Activity Tolerance good   Current Activity Tolerance moderate   Regular Exercise No   Equipment Currently Used at Home none   Activity/Exercise/Self-Care Comment Patient independent in ADLs/mobility without AE prior to December 14 fall   Functional Level   Ambulation 0-->independent   Transferring 0-->independent   Toileting 0-->independent   Bathing 0-->independent   Dressing 0-->independent   Eating 0-->independent   Communication 0-->understands/communicates without difficulty   Swallowing 0-->swallows foods/liquids without difficulty   Cognition 0 - no cognition issues reported   Fall history within last six months yes   Number of times patient has fallen within last six months 1   Prior Functional Level Comment Patient independent in ADLs/mobility without AE prior to December 14 fall   General Information   Onset of Illness/Injury or Date of Surgery - Date 01/05/20   Referring Physician Dr. Mason   Patient/Family Goals Statement return home to baseline   Additional Occupational Profile Info/Pertinent History of Current Problem Jyoti De Jesus is a 89 year old female now s/p spino-pelvic fixation on 1/5/2020 with Dr. Mason   Precautions/Limitations spinal precautions   General Observations On RA, alert   Cognitive Status Examination   Cognitive Comment No cognitive concerns   Sensory Examination   Sensory Comments No N/T noted   Pain Assessment   Patient Currently in Pain Yes, see Vital Sign flowsheet   Range of Motion (ROM)   ROM Comment B UE AROM WFL   Mobility   Bed Mobility Bed mobility skill: Supine to sit    Bed Mobility Skill: Supine to Sit   Level of Moore: Supine/Sit minimum assist (75% patients effort)   Assistive Device: Supine/Sit bedrail   Transfer Skill: Bed to Chair/Chair to Bed   Level of Moore: Bed to Chair minimum assist (75% patients effort)   Assistive Device - Transfer Skill Bed to Chair Chair to Bed Rehab Eval standard walker   Transfer Skill: Sit to Stand   Level of Moore: Sit/Stand minimum assist (75% patients effort)   Assistive Device for Transfer: Sit/Stand standard walker   Transfer Skill: Toilet Transfer   Level of Moore: Toilet stand-by assist   Assistive Device standard walker;other (see comments)  (commode)   Bathing   Level of Moore unable to perform   Upper Body Dressing   Level of Moore: Dress Upper Body independent   Lower Body Dressing   Level of Moore: Dress Lower Body maximum assist (25% patients effort)   Toileting   Level of Moore: Toilet minimum assist (75% patients effort)   Grooming   Level of Moore: Grooming stand-by assist   Eating/Self Feeding   Level of Moore: Eating independent   Activities of Daily Living Analysis   Impairments Contributing to Impaired Activities of Daily Living pain;post surgical precautions;ROM decreased;strength decreased   General Therapy Interventions   Planned Therapy Interventions ADL retraining   Clinical Impression   Criteria for Skilled Therapeutic Interventions Met yes, treatment indicated   OT Diagnosis impaired self-cares/mobility   Influenced by the following impairments pain; decreased ROM   Assessment of Occupational Performance 1-3 Performance Deficits   Identified Performance Deficits dressing, bathing, toileting   Clinical Decision Making (Complexity) Low complexity   Therapy Frequency Daily   Predicted Duration of Therapy Intervention (days/wks) 2-3 days   Anticipated Discharge Disposition Transitional Care Facility   Risks and Benefits of Treatment have been explained.  "Yes   Patient, Family & other staff in agreement with plan of care Yes   [facility] AM-PAC TM \"6 Clicks\"   2016, Trustees of [facility], under license to [employer].  All rights reserved.   6 Clicks Short Forms Daily Activity Inpatient Short Form   [facility] AM-PAC  \"6 Clicks\" Daily Activity Inpatient Short Form   1. Putting on and taking off regular lower body clothing? 2 - A Lot   2. Bathing (including washing, rinsing, drying)? 2 - A Lot   3. Toileting, which includes using toilet, bedpan or urinal? 3 - A Little   4. Putting on and taking off regular upper body clothing? 3 - A Little   5. Taking care of personal grooming such as brushing teeth? 3 - A Little   6. Eating meals? 4 - None   Daily Activity Raw Score (Score out of 24.Lower scores equate to lower levels of function) 17   Total Evaluation Time   Total Evaluation Time (Minutes) 8     "

## 2020-01-08 NOTE — PROGRESS NOTES
Orthopaedic Surgery Progress Note 01/08/2020    E: No acute events overnight.    S: POD3. Working with PT and has gotten out of bed to commode. Pain controlled. Denies numbness or tingling. Denies cp/sob/n/v. Tolerating oral diet. -BM +flatus. Voiding spontaneously. Plan of care reviewed and questions answered. Drain removed this AM.    O:  Temp: 99.1  F (37.3  C) Temp src: Oral BP: 100/59 Pulse: 78 Heart Rate: 84 Resp: 18 SpO2: 97 % O2 Device: None (Room air)      Exam:  Gen: NAD. Resting comfortably in bed  Resp: Breathing comfortably on RA  Drain:   Superficial drain output 5/5/0 ml serosanguinous last 3 shifts     Musculoskeletal: Dressing is c/d/i      Lower extremities:  Motor Strength Right Left   Hip flexion: L1, L2, L3 4/5 4/5   Hip adduction: L2, L3 5/5 5/5   Knee flexion: S1 5/5 5/5   Knee extension: L3, L4 5/5 5/5   Ankle dosiflexion: L4, L5 5/5 5/5   EHL: L5 4/5 4/5   Ankle plantarflexion: S1 5/5 5/5      Sensation from L1-S2 is preserved.      Recent Labs   Lab 01/06/20  0811 01/04/20  2200   WBC 8.0 9.3   HGB 8.2* 12.0   * 210       Assessment and Plan: Jyoti De Jesus is a 89 year old female now s/p spino-pelvic fixation on 1/5/2020 with Dr. Mason.      Goals Today:  - PT/OT only as tolerated  - X-rays (AP/inlet/outlet/lateral) when tolerated  - Endocrinology consult (consider Forteo)     Orthopedics Primary  Activity: No HOB restrictions.  Bed to chair only x3 days.  Then up with assistive device.  No excessive bending or twisting. No lifting >10 lbs x 6 weeks. No Juan lift for transfers.   Weight bearing status: Protected WB.  Pain management: Transition from IV to PO as tolerated. No NSAIDs.   Antibiotics: Ancef x24 hours - completed.  Diet: Begin with clear fluids and progress diet as tolerated.   DVT prophylaxis: SCDs only. No chemical DVT ppx needed.  Imaging: XR Upright PTDC - ordered.  Labs: Labs PRN.  Bracing/Splinting: None.  Dressings: Keep Aquacel c/d/i x 7 days.  Drains:  HV removed 1/8/2020.  Pino catheter: Removed per protocol.   Physical Therapy/Occupational Therapy: Eval and treat.  Cultures: None.  Consults: Hospitalist.  Follow-up: Clinic with Dr. Mason in 6 weeks with repeat x-rays.   Disposition: Pending progress with therapies, pain control on orals, and medical stability, anticipate discharge to rehab vs home on POD #5-7.    Future Appointments   Date Time Provider Department Center   1/8/2020  9:00 AM Yecenia Silva OT [facility] [place]   1/8/2020 10:00 AM Marbin Oshea PT [facility] [place]   1/8/2020  2:30 PM Marbin Oshea PT [facility] [place]       Patient discussed with Dr. Isabella Tijerina MD, PhD  Orthopedic Surgery PGY4  Pager [phone]    Please page me directly with any questions/concerns during regular weekday hours before 5pm. If there is no response, if it is a weekend, or if it is during evening hours then please page the orthopaedic surgery resident on call.

## 2020-01-08 NOTE — PROGRESS NOTES
"[facility], [place]   Internal Medicine Daily Note           Interval History/Events     Overnight events reviewed  Reports doing well  Denies any chest pain, shortness of breath, lightheadedness or dizziness  No fever, chills ,nauesa, vomiting  No BM yet. Passing gas  No burning urination         Review of Systems        4 point ROS including Respiratory, CV, GI and , other than that noted above is negative      Medications   I have reviewed current medications  in the \"current medication\" section of Epic.  Relevant changes include:     Physical Exam   General:       Vital signs:    Blood pressure 129/65, pulse 84, temperature 98.5  F (36.9  C), temperature source Oral, resp. rate 16, height 1.594 m (5' 2.75\"), weight 57.6 kg (127 lb), SpO2 97 %, not currently breastfeeding.  Estimated body mass index is 22.68 kg/m  as calculated from the following:    Height as of this encounter: 1.594 m (5' 2.75\").    Weight as of this encounter: 57.6 kg (127 lb).      Intake/Output Summary (Last 24 hours) at 1/7/2020 1233  Last data filed at 1/7/2020 0941  Gross per 24 hour   Intake 360 ml   Output 2370 ml   Net -2010 ml      HEENT: No icterus, no pallor  Cardiovascular: S1, S2 normal  Respiratory:  B/L CTA  GI/Abdomen: Soft, NT, BS+  Neurology: Alert, awake,and oriented. No tremors.   Extremities: No pretibial edema.   Skin:      Laboratory and Imaging Studies     I have reviewed  laboratory and imaging studies in the Epic. Pertinent findings are as below:    BMP  Recent Labs   Lab 01/08/20  0638 01/07/20  0558 01/06/20  0811 01/04/20  2200     --  136 133   POTASSIUM 4.2  --  4.1 4.1   CHLORIDE 107  --  104 101   SAMMIE 8.0* 7.2* 7.3* 9.2   CO2 25  --  21 27   BUN 14  --  15 19   CR 0.85  --  0.73 0.92   GLC 90 109* 89 95     CBC  Recent Labs   Lab 01/08/20  0638 01/06/20  0811 01/04/20  2200   WBC 8.5 8.0 9.3   RBC 2.41* 2.36* 3.41*   HGB 8.5* 8.2* 12.0   HCT 26.1* 25.5* 35.7   * 108* " 105*   MCH 35.3* 34.7* 35.2*   MCHC 32.6 32.2 33.6   RDW 12.8 12.8 12.6    139* 210     INR  Recent Labs   Lab 01/04/20  2200   INR 1.06     LFTs  Recent Labs   Lab 01/04/20  2200   ALKPHOS 118   AST 18   ALT 21   BILITOTAL 0.5   PROTTOTAL 7.0   ALBUMIN 3.5      PANCNo lab results found in last 7 days.        Impression/Plan          89 year old female admitted on 1/4/2020 s/p following ORIF of fracture pelvis and spinal instrumentation L4-S1, cement augmentation L4-l5    She has a known H/O Hypothyroidism and glaucoma.  Internal medicine consulted for post operative co management.  Individual problems and their management are outlined below     # s/p  ORIF of fracture pelvis and spinal instrumentation L4-S1, cement augmentation L4-L5 on 01/05/2020  EBL at 275 ml.  Management primarily per Ortho team.  - Wound cares, Dressings, Surgical pain management, DVT Prophylaxis  per primary team.   - Monitor anemia, hemodynamics, Input/Output, Capnography (for 24 hours)  - Encourage Incentive spirometry  - Laxatives for constipation prophylaxis     # Anemia: Most likely due to Hemodilution, blood loss, and post surgical inflammation.  Hg 8.5 gm/dl on 01/08/2020  Asymptomatic   - Transfuse if Hg < 7 gm/dl      # Hypothyroidism:   Continue PTA Levothyroxine 75 mcg/day     # Glaucoma:   Ct PTA eye drops.     # Hypocalcemia: Albumin normal. Calcium 7.2 on 01/07.   Calcium carbonate increased to 500 mg TID from BID.   Calcium level 8 on 01/08  - Continue Calcium carbonate 500 mg TID    # Hypophosphatemia: level 2.3 on 01/08. Placed on protocol, and replaced  Phosphorus level 2.1  - Will add Neutraphos 1 pack TID  - Replace per protocol     # Thrombocytopenia: most likely d/t surgery  Level normal on 01/08  - monitor     # Diet: Regular Diet Adult    # DVT Prophylaxis: Pneumatic Compression Devices  # Pino Catheter: in place, indication: Wound Healing  # Code Status: Full Code         Pt's care was discussed with bedside  RN, patient and  during Care Team Rounds.               Roger Gomez MD  Hospitalist ( Internal medicine)  Pager: [phone]

## 2020-01-08 NOTE — PLAN OF CARE
"  VS:    /65 (BP Location: Left arm)   Pulse 79   Temp 98.8  F (37.1  C) (Oral)   Resp 16   Ht 1.594 m (5' 2.75\")   Wt 57.6 kg (127 lb)   SpO2 97%   Breastfeeding No   BMI 22.68 kg/m       Output:    Catheter removed this shift. Hemovac drain. Last bowel movement 01/02/19. Bowel sounds audible and active in all 4 quadrants.    Activity:     Pt up at bedside. Assist of 1. Pt only to ambulate to chair and commode.    Skin: Bruising on all extremities. Skin tear on R arm. Incision on back.    Pain:    Pain tolerable with prn oxycodone q4.    Neuro/CMS:    Denies numbness or tingling.    Dressing(s):     Spinal dressing clean, dry, intact, and without drainage.    Diet:    Regular. Tolerated 50% of dinner.   Equipment:     Icy hot machine, IV pole. Recliner, commode.    IV's/Drains:    PIV in L hand discontinued due to leaking.    Plan:    PIV insertion on 01/08/20 in am. Monitor for bowel movement and assess for further interventions.    Additional Info:     Pt concerned about possible incontinence of urine. As of this time pt has been continent.             "

## 2020-01-08 NOTE — CONSULTS
Social Work Services Progress Note    Hospital Day: 4  Collaborated with:  Pt, Pt's spouse, pt's son, medical team and chart review     Data:  Pt is a 89 year old female admitted on 1/4/2020 s/p following ORIF of fracture pelvis and spinal instrumentation L4-S1, cement augmentation L4-l5.    Intervention:      SW met with pt, pt spouse and son at bedside. SW reviewed duo recommendation for home vs. TCU. Family indicated they are open to TCU if that is the recommendation.     Pt/family choices follow in order of preference - All referrals sent    1) [facility]   [address] [place], [place] [id number] [phone]    2) [facility]   [address] [place], [place] [id number] [phone]    3) [facility]  [address] [place], [place] [id number] [phone]    4) [facility] restorative suites [address] [address]  [phone]  Accepted - They will have a private room with a $20 a day fee.     Assessment:  Pt meets criteria for inpatient hospitalization     Plan:    Anticipated Disposition:  TCU vs. Home with assist     Barriers to d/c plan:  Medical stability     Follow Up:  SW to follow up for discharge needs    MIMA Ivy, [facility]  Acute Care Float   [facility]  Pager: [phone]

## 2020-01-08 NOTE — PLAN OF CARE
"      VS:   /65 (BP Location: Left arm)   Pulse 79   Temp 98.8  F (37.1  C) (Oral)   Resp 16   Ht 1.594 m (5' 2.75\")   Wt 57.6 kg (127 lb)   SpO2 97%   Breastfeeding No   BMI 22.68 kg/m       Output:   2 void occurrences since last removed. Toileting q3 hr. Slight incontinence. Last BM 01/02/2020   Activity:   Assist x1, pivot to bedside commode,walker   Skin: Skin tear on R forearm covered with clear dressing. Bruising on both arms. Incision on back and hemovac.   Pain:   Pain controlled with oral oxy q 3-4 hr, repositioning, and hot ice machine   Neuro/CMS:   A&O x 4, denies numbness and tingling    Dressing(s):   Aquacel CDI, L lateral hip dressing CDI   Diet:   Regular    LDA:   Hemovac removed, no IV   Equipment:   Hot ice machine, bed alarm, commode, walker, PCD's   Plan:   Continue to follow POC   Additional Info:   Phosphorus result 2.1. Dr. Gomez is aware       "

## 2020-01-08 NOTE — PLAN OF CARE
Discharge Planner PT   Patient plan for discharge: not discussed  Current status: BED to Chair mobility til POD3. Pt SBA for bed mobility and transfers to/from bed and bed side commode. Pt was able to sit up in spine chair and perform seated exercises with LE's without increase in pain.   Barriers to return to prior living situation: spousal support, WB tolerance  Recommendations for discharge: Home versus TCU  Rationale for recommendations: Pt should progress well with therapies once she is cleared for gait. She does appear to be the caregiver for her  thus TCU may be beneficial.        Entered by: Marbin Oshea 01/08/2020 11:46 AM

## 2020-01-08 NOTE — PLAN OF CARE
"VS:    /59 (BP Location: Left arm)   Pulse 78   Temp 99.1  F (37.3  C) (Oral)   Resp 18   Ht 1.594 m (5' 2.75\")   Wt 57.6 kg (127 lb)   SpO2 97%   Breastfeeding No   BMI 22.68 kg/m    CSS on RA    Output: Voiding Spontaneously. Last BM 1/2 per pt report. Passing flatus.       Lungs: On RA, LS clear. No sob/cp    Activity: BED TO CHAIR ONLY- Pivot to chair/commode Ax1   Skin: Intact ex Incisions, skin tear R arm   Pain: Minimal pain- managed with scheduled Tylenol and has PRN oxycodone available    Neuro/CMS: AOx4, denies N/T. CMS intact.    Dressing(s): L lateral hip primapore and mid lower back aquacel CDI    Diet: Regular, tolerating PO intake well.      LDA: none   Equipment: PCDs, IS, spine chair, commode,walker, Hot ice machine   Plan: Continue POC, Able to make needs known. Call light within reach.   Plan to discharge to TCU when medically stable    Additional Info: Family at bedside.     Pt had Supine Xrays done this afternoon       "

## 2020-01-08 NOTE — PLAN OF CARE
Discharge Planner OT   Patient plan for discharge: TCU per patient and daughter.  At baseline, spouse and patient live in one level condo and patient was independent in ADLs/mobility without AE prior to fall Dec. 14.  Current status: Patient alert and oriented; family and spouse present.  Min-Mod A for bed mobility (needed assist for LB with sit to supine), Min A for transfers bed<>commode<>chair, Min A for toilet hygiene.  Educated in spinal precautions and AE for LB dressing.  Barriers to return to prior living situation: Pain, weakness  Recommendations for discharge: TCU  Rationale for recommendations: Continued daily OT for maximum independence in ADLs/mobility       Entered by: Caroline Hooks 01/08/2020 2:36 PM

## 2020-01-09 ENCOUNTER — APPOINTMENT (OUTPATIENT)
Dept: OCCUPATIONAL THERAPY | Facility: CLINIC | Age: 85
DRG: 516 | End: 2020-01-09
Payer: COMMERCIAL

## 2020-01-09 ENCOUNTER — APPOINTMENT (OUTPATIENT)
Dept: GENERAL RADIOLOGY | Facility: CLINIC | Age: 85
DRG: 516 | End: 2020-01-09
Attending: CLINICAL NURSE SPECIALIST
Payer: COMMERCIAL

## 2020-01-09 ENCOUNTER — APPOINTMENT (OUTPATIENT)
Dept: PHYSICAL THERAPY | Facility: CLINIC | Age: 85
DRG: 516 | End: 2020-01-09
Payer: COMMERCIAL

## 2020-01-09 LAB — PHOSPHATE SERPL-MCNC: 2.6 MG/DL (ref 2.5–4.5)

## 2020-01-09 PROCEDURE — 97530 THERAPEUTIC ACTIVITIES: CPT | Mod: GP | Performed by: PHYSICAL THERAPIST

## 2020-01-09 PROCEDURE — 25000132 ZZH RX MED GY IP 250 OP 250 PS 637: Performed by: ORTHOPAEDIC SURGERY

## 2020-01-09 PROCEDURE — 82040 ASSAY OF SERUM ALBUMIN: CPT | Performed by: INTERNAL MEDICINE

## 2020-01-09 PROCEDURE — 84100 ASSAY OF PHOSPHORUS: CPT | Performed by: INTERNAL MEDICINE

## 2020-01-09 PROCEDURE — 72170 X-RAY EXAM OF PELVIS: CPT

## 2020-01-09 PROCEDURE — 25000132 ZZH RX MED GY IP 250 OP 250 PS 637: Performed by: STUDENT IN AN ORGANIZED HEALTH CARE EDUCATION/TRAINING PROGRAM

## 2020-01-09 PROCEDURE — 12000001 ZZH R&B MED SURG/OB UMMC

## 2020-01-09 PROCEDURE — 25000132 ZZH RX MED GY IP 250 OP 250 PS 637: Performed by: INTERNAL MEDICINE

## 2020-01-09 PROCEDURE — 97110 THERAPEUTIC EXERCISES: CPT | Mod: GP | Performed by: PHYSICAL THERAPIST

## 2020-01-09 PROCEDURE — 97535 SELF CARE MNGMENT TRAINING: CPT | Mod: GO | Performed by: OCCUPATIONAL THERAPIST

## 2020-01-09 PROCEDURE — 99232 SBSQ HOSP IP/OBS MODERATE 35: CPT | Performed by: INTERNAL MEDICINE

## 2020-01-09 PROCEDURE — 36415 COLL VENOUS BLD VENIPUNCTURE: CPT | Performed by: INTERNAL MEDICINE

## 2020-01-09 RX ORDER — OXYCODONE HYDROCHLORIDE 5 MG/1
5 TABLET ORAL EVERY 4 HOURS PRN
Qty: 60 TABLET | Refills: 0 | Status: SHIPPED | OUTPATIENT
Start: 2020-01-09 | End: 2020-01-10

## 2020-01-09 RX ORDER — AMOXICILLIN 250 MG
1-2 CAPSULE ORAL 2 TIMES DAILY PRN
Qty: 28 TABLET | Refills: 0 | Status: SHIPPED | OUTPATIENT
Start: 2020-01-09

## 2020-01-09 RX ADMIN — BRIMONIDINE TARTRATE 2 DROP: 2 SOLUTION/ DROPS OPHTHALMIC at 08:30

## 2020-01-09 RX ADMIN — Medication 5000 UNITS: at 08:28

## 2020-01-09 RX ADMIN — OXYCODONE HYDROCHLORIDE 5 MG: 5 TABLET ORAL at 00:22

## 2020-01-09 RX ADMIN — ACETAMINOPHEN 650 MG: 325 TABLET, FILM COATED ORAL at 06:56

## 2020-01-09 RX ADMIN — DORZOLAMIDE HYDROCHLORIDE AND TIMOLOL MALEATE 2 DROP: 20; 5 SOLUTION/ DROPS OPHTHALMIC at 19:59

## 2020-01-09 RX ADMIN — BISACODYL 10 MG: 10 SUPPOSITORY RECTAL at 10:34

## 2020-01-09 RX ADMIN — BRIMONIDINE TARTRATE 2 DROP: 2 SOLUTION/ DROPS OPHTHALMIC at 19:59

## 2020-01-09 RX ADMIN — DORZOLAMIDE HYDROCHLORIDE AND TIMOLOL MALEATE 2 DROP: 20; 5 SOLUTION/ DROPS OPHTHALMIC at 08:30

## 2020-01-09 RX ADMIN — LEVOTHYROXINE SODIUM 75 MCG: 25 TABLET ORAL at 08:28

## 2020-01-09 RX ADMIN — POLYETHYLENE GLYCOL 3350 17 G: 17 POWDER, FOR SOLUTION ORAL at 08:28

## 2020-01-09 RX ADMIN — CALCIUM 500 MG: 500 TABLET ORAL at 18:33

## 2020-01-09 RX ADMIN — CALCIUM 500 MG: 500 TABLET ORAL at 12:30

## 2020-01-09 RX ADMIN — ACETAMINOPHEN 650 MG: 325 TABLET, FILM COATED ORAL at 19:59

## 2020-01-09 RX ADMIN — DIBASIC SODIUM PHOSPHATE, MONOBASIC POTASSIUM PHOSPHATE AND MONOBASIC SODIUM PHOSPHATE 250 MG: 852; 155; 130 TABLET ORAL at 14:41

## 2020-01-09 RX ADMIN — SENNOSIDES AND DOCUSATE SODIUM 2 TABLET: 8.6; 5 TABLET ORAL at 19:59

## 2020-01-09 RX ADMIN — OXYCODONE HYDROCHLORIDE 5 MG: 5 TABLET ORAL at 22:31

## 2020-01-09 RX ADMIN — DIBASIC SODIUM PHOSPHATE, MONOBASIC POTASSIUM PHOSPHATE AND MONOBASIC SODIUM PHOSPHATE 250 MG: 852; 155; 130 TABLET ORAL at 10:33

## 2020-01-09 RX ADMIN — MULTIPLE VITAMINS W/ MINERALS TAB 1 TABLET: TAB at 19:59

## 2020-01-09 RX ADMIN — SENNOSIDES AND DOCUSATE SODIUM 2 TABLET: 8.6; 5 TABLET ORAL at 08:29

## 2020-01-09 RX ADMIN — FAMOTIDINE 20 MG: 10 TABLET, COATED ORAL at 08:28

## 2020-01-09 RX ADMIN — CALCIUM 500 MG: 500 TABLET ORAL at 08:29

## 2020-01-09 ASSESSMENT — ACTIVITIES OF DAILY LIVING (ADL)
ADLS_ACUITY_SCORE: 12

## 2020-01-09 NOTE — PROGRESS NOTES
Accepted to [facility] and [facility] both.    Social Work Services Progress Note    Hospital Day: 6  Collaborated with:  Pt, pt spouse, pt granddaughter, chart review    Data:  SW following for discharge planning, per provider pt will likely be here through the weekend. SW anticipating Monday admission.    Intervention:  Referrals in process    Pt/family choices follow in order of preference - preferences updated     1) [facility]   [address] [place] [address]  [phone]  Accepted - They will have a private room with a $20 a day fee. SW updated admissions that family is agreeable to this-[facility] will tent plan on Monday    2) [facility] - REVIEWING and submitting auth  [address] [place] [address]  [phone]     3) [facility] - DECLINED, no openings  [address] [place] [address]  [phone]    (2nd choice but not available) [facility] - NOT CURRENTLY ACCEPTING NEW ADMITS DUE TO GI OUTBREAK. Unsure if they will be taking pt's on Monday.   [address] [place], [place] [id number] [phone]     SW updated family of [facility] acceptance. Pt's daughter and granddaughter want pt to select [facility] as a top 2 or 3 choice. Pt and family agreeable to fee for a private room.     Assessment:  TCU recommended, pt not medically clear    Plan:    Anticipated Disposition:  Facility:  TBD    Barriers to d/c plan:  Bed availability, medical stability    Follow Up:  CHAN following for discharge planning    YUDI Corral  5A/10A Ortho/Med/Surg & [facility] Adult ED  Phone: [phone] Pager: [phone]

## 2020-01-09 NOTE — PROGRESS NOTES
Orthopaedic Surgery Progress Note 01/09/2020    E: No acute events overnight.    S: POD4. Working with PT/OT recommending TCU. Pain controlled. Denies numbness or tingling. Denies cp/sob/n/v. Tolerating oral diet. -BM +flatus. Voiding spontaneously. Plan of care reviewed and questions answered.    O:  Temp: 98.1  F (36.7  C) Temp src: Oral BP: 129/65 Pulse: 96   Resp: 16 SpO2: 97 % O2 Device: None (Room air)      Exam:    Gen: NAD. Resting comfortably in bed  Resp: Breathing comfortably on RA     Musculoskeletal: Dressing is c/d/i      Lower extremities:  Motor Strength Right Left   Hip flexion: L1, L2, L3 4/5 4/5   Hip adduction: L2, L3 5/5 5/5   Knee flexion: S1 5/5 5/5   Knee extension: L3, L4 5/5 5/5   Ankle dosiflexion: L4, L5 5/5 5/5   EHL: L5 4/5 4/5   Ankle plantarflexion: S1 5/5 5/5      Sensation from L1-S2 is preserved.       Recent Labs   Lab 01/08/20  0638 01/06/20  0811 01/04/20  2200   WBC 8.5 8.0 9.3   HGB 8.5* 8.2* 12.0    139* 210       Assessment and Plan: Jyoti De Jesus is a 89 year old female now s/p spino-pelvic fixation on 1/5/2020 with Dr. Mason.      Goals Today:  - PT/OT as tolerated  - XR inlet and outlet pelvis  - Endocrinology consult (consider Forteo)      Orthopedics Primary  Activity: No HOB restrictions.  Bed to chair only x3 days.  Then up with assistive device (walker).  No excessive bending or twisting. No lifting >10 lbs x 6 weeks. No Juan lift for transfers.   Weight bearing status: Protected WB.  Pain management: Transition from IV to PO as tolerated. No NSAIDs.   Antibiotics: Ancef x24 hours - completed.  Diet: Begin with clear fluids and progress diet as tolerated.   DVT prophylaxis: SCDs only. No chemical DVT ppx needed.  Imaging: XR Upright PTDC - need inlet/outlet.  Labs: Labs PRN.  Bracing/Splinting: None.  Dressings: Keep Aquacel c/d/i x 7 days.  Drains: HV removed 1/8/2020.  Pino catheter: Removed per protocol.   Physical Therapy/Occupational Therapy:  Eval and treat.  Cultures: None.  Consults: Hospitalist, Endocrinology - appreciate assistance in caring for patient.  Follow-up: Clinic with Dr. Mason in 6 weeks with repeat x-rays.   Disposition: Pending progress with therapies, pain control on orals, and medical stability, anticipate discharge to rehab on POD #5-7.       Future Appointments   Date Time Provider Department Center   1/9/2020  8:00 AM Mabrin Oshea, PT URPT [place]   1/9/2020  1:30 PM Caroline Hooks, OT [facility]OT [place]   1/9/2020  3:15 PM Marbin Oshea, PT [facility] [place]   2/20/2020 12:30 PM Wai Mason MD [facility]   3/19/2020  2:45 PM Wai Mason MD [facility]       Patient discussed with Dr. Isabella Tijerina MD, PhD  Orthopedic Surgery PGY4  Pager [phone]    Please page me directly with any questions/concerns during regular weekday hours before 5pm. If there is no response, if it is a weekend, or if it is during evening hours then please page the orthopaedic surgery resident on call.

## 2020-01-09 NOTE — PLAN OF CARE
Pt sitting in chair for meals. Transfers with stand by assist of 1/gait belt/walker. Gait steady.Refused pain medications/denies pain. Dressing CDI. Appetite good. Denies nausea. Abd soft. Passing flatus but no BM. Dulcolax suppository given. Pt able to pass flatus but no BM passed. Plan to do standing x-rays today. Call light within reach.

## 2020-01-09 NOTE — PLAN OF CARE
"      VS:   /65 (BP Location: Left arm)   Pulse 96   Temp 98.1  F (36.7  C) (Oral)   Resp 16   Ht 1.594 m (5' 2.75\")   Wt 57.6 kg (127 lb)   SpO2 97%   Breastfeeding No   BMI 22.68 kg/m       Output:   Voiding spontaneously without difficulty. Suppository given-awaiting results. Passing gas   Lungs CTA   Activity:   UP with pivot transfer SBA to commode and chair   Skin: Skin tear on arm, scabs on leg and incision   Pain:   Small pain when turning otherwise denies. PRN tylenol given   Neuro/CMS:   A&Ox3, denies numbness and tingling   Dressing(s):   CDI   Diet:   Regular-tolerating   LDA:   none   Equipment:   walker   Plan:   Continue to monitor and follow plan of care. Able to make needs known and call light within reach.   Additional Info:   Discharge to TCU when medically stable       "

## 2020-01-09 NOTE — PROGRESS NOTES
"[facility], [place]   Internal Medicine Daily Note           Interval History/Events     Overnight events reviewed  Reports doing well  No nausea, vomiting  Passing gas, but no BM yet  No abdominal pain, tolerating diet well  No fever, chills  No lightheadedness or dizziness  No burning urination  No cough, chest pain, shortness of breaht         Review of Systems        4 point ROS including Respiratory, CV, GI and , other than that noted above is negative      Medications   I have reviewed current medications  in the \"current medication\" section of Epic.  Relevant changes include:     Physical Exam   General:       Vital signs:    Blood pressure 102/76, pulse 85, temperature 98.8  F (37.1  C), temperature source Oral, resp. rate 16, height 1.594 m (5' 2.75\"), weight 57.6 kg (127 lb), SpO2 96 %, not currently breastfeeding.  Estimated body mass index is 22.68 kg/m  as calculated from the following:    Height as of this encounter: 1.594 m (5' 2.75\").    Weight as of this encounter: 57.6 kg (127 lb).      Intake/Output Summary (Last 24 hours) at 1/7/2020 1233  Last data filed at 1/7/2020 0941  Gross per 24 hour   Intake 360 ml   Output 2370 ml   Net -2010 ml      Constitutional: Laying on bed in no acute distress  Eye: No icterus, no pallor  Mouth/ENT: Moist oral mucosa  Respiratory: B/l  CTA  Cardiovascular: S1, S2 normal. No pretibial edema  GI: Soft, NT, BS+  : No last's catheter  Neuro: Alert, awake, and conversing. Normal strength in all four extremities  Psych: Normal mood  Integumentary: No rashes  MSK:  Heme/Lymph:     Laboratory and Imaging Studies     I have reviewed  laboratory and imaging studies in the Epic. Pertinent findings are as below:    BMP  Recent Labs   Lab 01/08/20  0638 01/07/20  0558 01/06/20  0811 01/04/20  2200     --  136 133   POTASSIUM 4.2  --  4.1 4.1   CHLORIDE 107  --  104 101   SAMMIE 8.0* 7.2* 7.3* 9.2   CO2 25  --  21 27   BUN 14  --  15 19   CR " 0.85  --  0.73 0.92   GLC 90 109* 89 95     CBC  Recent Labs   Lab 01/08/20  0638 01/06/20  0811 01/04/20  2200   WBC 8.5 8.0 9.3   RBC 2.41* 2.36* 3.41*   HGB 8.5* 8.2* 12.0   HCT 26.1* 25.5* 35.7   * 108* 105*   MCH 35.3* 34.7* 35.2*   MCHC 32.6 32.2 33.6   RDW 12.8 12.8 12.6    139* 210     INR  Recent Labs   Lab 01/04/20  2200   INR 1.06     LFTs  Recent Labs   Lab 01/04/20  2200   ALKPHOS 118   AST 18   ALT 21   BILITOTAL 0.5   PROTTOTAL 7.0   ALBUMIN 3.5      PANCNo lab results found in last 7 days.        Impression/Plan          89 year old female admitted on 1/4/2020 s/p following ORIF of fracture pelvis and spinal instrumentation L4-S1, cement augmentation L4-l5    She has a known H/O Hypothyroidism and glaucoma.  Internal medicine consulted for post operative co management.  Individual problems and their management are outlined below     # s/p  ORIF of fracture pelvis and spinal instrumentation L4-S1, cement augmentation L4-L5 on 01/05/2020  EBL at 275 ml.  Management primarily per Ortho team.  - Wound cares, Dressings, Surgical pain management, DVT Prophylaxis  per primary team.   - Monitor anemia, hemodynamics, Input/Output, Capnography (for 24 hours)  - Encourage Incentive spirometry  - Laxatives for constipation prophylaxis     # Anemia: Most likely due to Hemodilution, blood loss, and post surgical inflammation.  Hg 8.5 gm/dl on 01/08/2020  Asymptomatic   - Transfuse if Hg < 7 gm/dl      # Hypothyroidism:   Continue PTA Levothyroxine 75 mcg/day     # Glaucoma:   Ct PTA eye drops.     # Hypocalcemia: Albumin normal. Calcium 7.2 on 01/07.   Calcium carbonate increased to 500 mg TID from BID.   Calcium level 8 on 01/08  - Continue Calcium carbonate 500 mg TID    # Hypophosphatemia: level 2.3 on 01/08. Placed on protocol, and replaced  Phosphorus level 2.1 on 01/08 ,and normal level on 2.6 on 01/09  She was started on Neutraphos 1 pack TID  - Replace per protocol     # Thrombocytopenia:  most likely d/t surgery  Level normal on 01/08  - monitor     # Diet: Regular Diet Adult    # DVT Prophylaxis: Pneumatic Compression Devices  # Pino Catheter: in place, indication: Wound Healing  # Code Status: Full Code         Pt's care was discussed with bedside RN, patient and  during Care Team Rounds.               Roger Gomez MD  Hospitalist ( Internal medicine)  Pager: [phone]

## 2020-01-09 NOTE — PLAN OF CARE
Discharge Planner PT   Patient plan for discharge: TCU  Current status: Weight bearing status bed to chair. Patient completes transfers with supervision and completes seated exercise without complaints of pain or dizziness. Pt currently still Bed to Chair orders. Holding on gait training until cleared by MD.  Barriers to return to prior living situation: Increased pain from baseline and limitations in weight bearing status  Recommendations for discharge: TCU  Rationale for recommendations: To progress functional mobility and core strength and stability.       Entered by: Caity Moran 01/09/2020 9:09 AM

## 2020-01-09 NOTE — PROGRESS NOTES
Endocrine Consult Follow Up   Patient: Jyoti De Jesus   MRN: [id number]  Date of Service: 1/9/2020    Assessment:  Jyoti De Jesus is a 89 year old woman with history of hypothyroidism and osteoporosis now admitted after ORIF with pelvic fixation for sacral fracture. Fracture occurred after a fall from standing height and is consistent with a fragility fracture. She also has an age-indeterminate compression fracture seen on imaging, sustained without known trauma.     Osteoporosis was previously treated for ~5 years with oral bisphosphonate followed by drug holiday; treatment was now several years ago. Currently taking calcium and vitamin D.     I discussed lab results and available therapies in detail with patient and family today. Options include teriparatide/abaloparatide, bisphosphonate (particularly zoledronic acid), denosumab, romosozumab. An anabolic therapy (teriparatide/abaloparatide or romosozumab) is a favorable option because of recent fragility fracture and desire to build bone mass. She is willing to consider a daily injection therapy but would like to think about this more and talk to her other doctors before deciding. Risks and benefits of various therapies were reviewed. She is concerned about price and insurance coverage as she considers options.      Recommendations:   - Recommend collection of 24 hour urine sample for calcium and creatinine   - Patient and family are going to consider therapy options and discuss with the orthopedic team  - If urine calcium is not elevated, and patient agreeable, it would be reasonable to start therapy with an anabolic agent while inpatient. Teriparatide has the longest track record of safety. Recommend checking insurance coverage prior to starting. Abaloparatide is a good alternative if favored by insurance.   - Monitor for orthostatic hypotension with initial doses  - Continue calcium and vitamin D supplements   - She will need a DEXA to  establish baseline bone mineral density - ideally this would be completed as soon as possible but does not need to delay initiation of treatment   - If she decides against anabolic therapy would consider annual zolendronic acid next; this could be arranged through our clinic    Our team will follow and assist with arranging endocrine clinic follow up when patient ready for discharge.     Karen Salgado MD  Endocrinology and Diabetes   [phone]      Subjective:  Feeling fine. Pino has been removed, reports constipation. Planning for rehab, likely next week.     No history of radiation to bone.  No personal or family history of osteosarcoma.   Has never had a kidney stone.     Medications:  Current Facility-Administered Medications   Medication     acetaminophen (TYLENOL) tablet 650 mg     benzocaine-menthol (CEPACOL) 15-3.6 MG lozenge 1 lozenge     bisacodyl (DULCOLAX) Suppository 10 mg     brimonidine (ALPHAGAN) 0.2 % ophthalmic solution 2 drop     calcium carbonate 500 mg (elemental) (OSCAL;OYSTER SHELL CALCIUM) tablet 500 mg     cholecalciferol (VITAMIN D3) 5000 units (125 mcg) capsule 5,000 Units     dorzolamide-timolol (COSOPT) ophthalmic solution 2 drop     famotidine (PEPCID) tablet 20 mg     HYDROmorphone (PF) (DILAUDID) injection 0.2 mg     lactated ringers infusion     levothyroxine (SYNTHROID/LEVOTHROID) tablet 75 mcg     lidocaine (LMX4) cream     lidocaine 1 % 1 mL     LORazepam (ATIVAN) tablet 0.5 mg     melatonin tablet 3 mg     multivitamin w/minerals (THERA-VIT-M) tablet 1 tablet     naloxone (NARCAN) injection 0.1-0.4 mg     ondansetron (ZOFRAN-ODT) ODT tab 4 mg    Or     ondansetron (ZOFRAN) injection 4 mg     oxyCODONE (ROXICODONE) tablet 5 mg     phosphorus tablet 250 mg (PHOSPHA 250 NEUTRAL) per tablet 250 mg     polyethylene glycol (MIRALAX/GLYCOLAX) Packet 17 g     polyethylene glycol (MIRALAX/GLYCOLAX) Packet 17 g     potassium phosphate 15 mmol in D5W 250 mL intermittent infusion      "potassium phosphate 20 mmol in D5W 250 mL intermittent infusion     potassium phosphate 20 mmol in D5W 500 mL intermittent infusion     potassium phosphate 25 mmol in D5W 500 mL intermittent infusion     senna-docusate (SENOKOT-S/PERICOLACE) 8.6-50 MG per tablet 1-2 tablet     sodium chloride (PF) 0.9% PF flush 3 mL     sodium chloride (PF) 0.9% PF flush 3 mL     sodium chloride 0.9% infusion     sodium phosphate (FLEET ENEMA) 1 enema       Physical Examination:  Blood pressure 102/76, pulse 85, temperature 98.8  F (37.1  C), temperature source Oral, resp. rate 16, height 1.594 m (5' 2.75\"), weight 57.6 kg (127 lb), SpO2 96 %, not currently breastfeeding.  GEN: Awake, alert, no distress. Lying in bed.   HEENT: EOMI.  RESP: Breathing comfortably.   NEURO: Oriented and appropriate.     Labs:   ENDO CALCIUM LABS-UMP Latest Ref Rng & Units 1/9/2020 1/8/2020   CALCIUM 8.5 - 10.1 mg/dL  8.0 (L)   PHOSPHOROUS 2.5 - 4.5 mg/dL 2.6 2.1 (L)   MAGNESIUM 1.6 - 2.3 mg/dL     ALBUMIN 3.4 - 5.0 g/dL     BUN 7 - 30 mg/dL  14   CREATININE 0.52 - 1.04 mg/dL  0.85   PARATHYROID HORMONE INTACT 18 - 80 pg/mL     ALKPHOS 40 - 150 U/L     VITAMIN D DEFICIENCY SCREENING 20 - 75 ug/L     PROTEIN, TOTAL 6.8 - 8.8 g/dL       ENDO CALCIUM LABS-UMP Latest Ref Rng & Units 1/7/2020 1/6/2020   CALCIUM 8.5 - 10.1 mg/dL 7.2 (L) 7.3 (L)   PHOSPHOROUS 2.5 - 4.5 mg/dL 2.3 (L)    MAGNESIUM 1.6 - 2.3 mg/dL 1.9    ALBUMIN 3.4 - 5.0 g/dL     BUN 7 - 30 mg/dL  15   CREATININE 0.52 - 1.04 mg/dL  0.73   PARATHYROID HORMONE INTACT 18 - 80 pg/mL 33    ALKPHOS 40 - 150 U/L     VITAMIN D DEFICIENCY SCREENING 20 - 75 ug/L 41    PROTEIN, TOTAL 6.8 - 8.8 g/dL       ENDO CALCIUM LABS-UMP Latest Ref Rng & Units 1/4/2020   CALCIUM 8.5 - 10.1 mg/dL 9.2   PHOSPHOROUS 2.5 - 4.5 mg/dL    MAGNESIUM 1.6 - 2.3 mg/dL    ALBUMIN 3.4 - 5.0 g/dL 3.5   BUN 7 - 30 mg/dL 19   CREATININE 0.52 - 1.04 mg/dL 0.92   PARATHYROID HORMONE INTACT 18 - 80 pg/mL    ALKPHOS 40 - 150 U/L 118 "   VITAMIN D DEFICIENCY SCREENING 20 - 75 ug/L    PROTEIN, TOTAL 6.8 - 8.8 g/dL 7.0

## 2020-01-09 NOTE — PLAN OF CARE
"      VS:   /71   Pulse 96   Temp 98.8  F (37.1  C) (Oral)   Resp 16   Ht 1.594 m (5' 2.75\")   Wt 57.6 kg (127 lb)   SpO2 96%   Breastfeeding No   BMI 22.68 kg/m       Output:   3 occurrence voiding during shift. Last BM prior to surgery. Bowel sounds active and passing gas.    Activity:   Assist X 1 pivot transfer commode and chair   Skin: Intact ex incision. Aquacel CDI, skin tear on R forearm covered with clear thin Tegaderm.      Pain:   Controlled with PRN tylenol and oxycodone   Neuro/CMS:   A&O x 4, denies any numbness or tingling    Dressing(s):   Aquacel over incision. Bilateral gluteal primapore CDI.    Diet:   Regular   LDA:   None   Equipment:   Walker, PCD's, commode   Plan:   Continue plan of care, pt is able to make needs known, call light within reach.    Additional Info:   Pt had suppository on 01/8/20 without success, pt has ordered prune juice with breakfast     "

## 2020-01-09 NOTE — PLAN OF CARE
Discharge Planner OT   Patient plan for discharge: TCU   Current status: Patient pleasant and alert.  SBA for bed mobility with cues.  Patient reviewed LB dressing with AE, completed g/h tasks EOB.  OOB activity not completed as patient was just recently up in chair and is still bed<>chair transfers only.  Barriers to return to prior living situation: Pain, limited mobility, decreased ROM  Recommendations for discharge: TCU anticipated but may progress to home once activity is able to progress  Rationale for recommendations: Continued daily OT for maximum independence in ADLs/mobility       Entered by: Caroline Hooks 01/09/2020 10:17 AM

## 2020-01-10 ENCOUNTER — TELEPHONE (OUTPATIENT)
Dept: ENDOCRINOLOGY | Facility: CLINIC | Age: 85
End: 2020-01-10

## 2020-01-10 ENCOUNTER — APPOINTMENT (OUTPATIENT)
Dept: PHYSICAL THERAPY | Facility: CLINIC | Age: 85
DRG: 516 | End: 2020-01-10
Payer: COMMERCIAL

## 2020-01-10 ENCOUNTER — APPOINTMENT (OUTPATIENT)
Dept: OCCUPATIONAL THERAPY | Facility: CLINIC | Age: 85
DRG: 516 | End: 2020-01-10
Payer: COMMERCIAL

## 2020-01-10 DIAGNOSIS — M81.0 OSTEOPOROSIS: Primary | ICD-10-CM

## 2020-01-10 DIAGNOSIS — M84.48XA SACRAL INSUFFICIENCY FRACTURE: ICD-10-CM

## 2020-01-10 DIAGNOSIS — S32.000A LUMBAR COMPRESSION FRACTURE (H): ICD-10-CM

## 2020-01-10 DIAGNOSIS — Z98.890 S/P SPINAL SURGERY: ICD-10-CM

## 2020-01-10 LAB
ALBUMIN SERPL-MCNC: 2.6 G/DL (ref 3.4–5)
ALBUMIN UR-MCNC: NEGATIVE MG/DL
APPEARANCE UR: CLEAR
BACTERIA #/AREA URNS HPF: ABNORMAL /HPF
BILIRUB UR QL STRIP: NEGATIVE
COLOR UR AUTO: YELLOW
GLUCOSE UR STRIP-MCNC: NEGATIVE MG/DL
HGB UR QL STRIP: NEGATIVE
KETONES UR STRIP-MCNC: NEGATIVE MG/DL
LEUKOCYTE ESTERASE UR QL STRIP: NEGATIVE
MUCOUS THREADS #/AREA URNS LPF: PRESENT /LPF
NITRATE UR QL: NEGATIVE
PH UR STRIP: 7.5 PH (ref 5–7)
RBC #/AREA URNS AUTO: 2 /HPF (ref 0–2)
SOURCE: ABNORMAL
SP GR UR STRIP: 1.01 (ref 1–1.03)
SQUAMOUS #/AREA URNS AUTO: <1 /HPF (ref 0–1)
UROBILINOGEN UR STRIP-MCNC: NORMAL MG/DL (ref 0–2)
WBC #/AREA URNS AUTO: 2 /HPF (ref 0–5)

## 2020-01-10 PROCEDURE — 99232 SBSQ HOSP IP/OBS MODERATE 35: CPT | Performed by: INTERNAL MEDICINE

## 2020-01-10 PROCEDURE — 97116 GAIT TRAINING THERAPY: CPT | Mod: GP | Performed by: PHYSICAL THERAPIST

## 2020-01-10 PROCEDURE — 25000132 ZZH RX MED GY IP 250 OP 250 PS 637: Performed by: ORTHOPAEDIC SURGERY

## 2020-01-10 PROCEDURE — 82040 ASSAY OF SERUM ALBUMIN: CPT | Performed by: INTERNAL MEDICINE

## 2020-01-10 PROCEDURE — 25000132 ZZH RX MED GY IP 250 OP 250 PS 637: Performed by: INTERNAL MEDICINE

## 2020-01-10 PROCEDURE — 25000132 ZZH RX MED GY IP 250 OP 250 PS 637: Performed by: STUDENT IN AN ORGANIZED HEALTH CARE EDUCATION/TRAINING PROGRAM

## 2020-01-10 PROCEDURE — 12000001 ZZH R&B MED SURG/OB UMMC

## 2020-01-10 PROCEDURE — 97535 SELF CARE MNGMENT TRAINING: CPT | Mod: GO | Performed by: OCCUPATIONAL THERAPIST

## 2020-01-10 PROCEDURE — 81001 URINALYSIS AUTO W/SCOPE: CPT | Performed by: STUDENT IN AN ORGANIZED HEALTH CARE EDUCATION/TRAINING PROGRAM

## 2020-01-10 PROCEDURE — 97110 THERAPEUTIC EXERCISES: CPT | Mod: GP | Performed by: PHYSICAL THERAPIST

## 2020-01-10 RX ORDER — OXYCODONE HYDROCHLORIDE 5 MG/1
5 TABLET ORAL EVERY 4 HOURS PRN
Qty: 60 TABLET | Refills: 0 | Status: SHIPPED | OUTPATIENT
Start: 2020-01-10 | End: 2020-01-14

## 2020-01-10 RX ADMIN — BRIMONIDINE TARTRATE 2 DROP: 2 SOLUTION/ DROPS OPHTHALMIC at 19:03

## 2020-01-10 RX ADMIN — CALCIUM 500 MG: 500 TABLET ORAL at 19:07

## 2020-01-10 RX ADMIN — BRIMONIDINE TARTRATE 2 DROP: 2 SOLUTION/ DROPS OPHTHALMIC at 09:47

## 2020-01-10 RX ADMIN — LEVOTHYROXINE SODIUM 75 MCG: 25 TABLET ORAL at 09:45

## 2020-01-10 RX ADMIN — MULTIPLE VITAMINS W/ MINERALS TAB 1 TABLET: TAB at 19:07

## 2020-01-10 RX ADMIN — ACETAMINOPHEN 650 MG: 325 TABLET, FILM COATED ORAL at 16:36

## 2020-01-10 RX ADMIN — DORZOLAMIDE HYDROCHLORIDE AND TIMOLOL MALEATE 2 DROP: 20; 5 SOLUTION/ DROPS OPHTHALMIC at 09:43

## 2020-01-10 RX ADMIN — Medication 5000 UNITS: at 09:44

## 2020-01-10 RX ADMIN — DORZOLAMIDE HYDROCHLORIDE AND TIMOLOL MALEATE 2 DROP: 20; 5 SOLUTION/ DROPS OPHTHALMIC at 19:03

## 2020-01-10 RX ADMIN — FAMOTIDINE 20 MG: 10 TABLET, COATED ORAL at 09:45

## 2020-01-10 RX ADMIN — CALCIUM 500 MG: 500 TABLET ORAL at 12:07

## 2020-01-10 RX ADMIN — CALCIUM 500 MG: 500 TABLET ORAL at 09:45

## 2020-01-10 RX ADMIN — ACETAMINOPHEN 650 MG: 325 TABLET, FILM COATED ORAL at 21:52

## 2020-01-10 RX ADMIN — ACETAMINOPHEN 650 MG: 325 TABLET, FILM COATED ORAL at 12:08

## 2020-01-10 RX ADMIN — LORAZEPAM 0.5 MG: 0.5 TABLET ORAL at 19:07

## 2020-01-10 ASSESSMENT — ACTIVITIES OF DAILY LIVING (ADL)
ADLS_ACUITY_SCORE: 12
ADLS_ACUITY_SCORE: 16
ADLS_ACUITY_SCORE: 16
ADLS_ACUITY_SCORE: 12

## 2020-01-10 NOTE — PROGRESS NOTES
"SPIRITUAL HEALTH SERVICES  SPIRITUAL ASSESSMENT Progress Note  [facility] ([facility]) 10A     REFERRAL SOURCE: Duration of Stay    During this visit Jyoti's  was visiting. Pt expressed feeling better and, \"God is helping me in these challenges.\" I listened reflectively to pt spiritual-social narrative, \"I have lived with my  for 66 years, and happy my family are supportive in this healing.\"   In regard to pt request for prayer, pt identified as Lutheran inquiring, \"Can I say a Kika Nguyen prayer\"   I assessed pt spiritual needs, offered spiritual support, and ended visit with a \"Hail Wendy\" prayer recited by pt.    PLAN: [facility] will follow-up once a week to support Pt ongoing spiritual needs on Unit.    Sujey Martinez  Chaplain Resident  Pager [phone]  "

## 2020-01-10 NOTE — PROGRESS NOTES
Evaluated patient.  Pt tells me that she is able to move around with less pain than before surgery.  Post-operative course going well.  She had 5-6 BM last evening, pt indicated she had gotten a suppository and has been stooling since.  She can feel when she is moving stool.  She feels that she is passing gas and seems to have stool with it.  For urination, she indicates that she can urinate on a commode but seems to have to bear down to go.  She feels that she is emptying. She does have some dribbling when she stands.  It seems a similar picture to when she presented initially.  She was able to urinate (what she says fully) yesterday on the commode).  She indicates that it seemed that her sensation is better.       Patients's exam consistent with the exam I obtained Sunday 1/5.  Full strength in lowers, pin prick intact in legs, genital area, and perianal area.  Decreased soft touch in genital area, perianal area (baseline).  Rectal tone intact, able to bear down (stool in vault).      Extensive discussion with pt,  and daughter.  At this time, we feel that her symptoms are no worse than when she presented. The stooling is confounded by stool softeners/suppositories.  At this time we would continue to observe.  The family indicated they would likely defer another surgery as the other options would be continuing diapers and/or catheterizations. Will update Dr. Taylor.     Leny Castillo, PGY7  Neurosurgery Chief Resident  Neuro-endovascular fellow    Please, for questions or concerns, do not hesitate to call Neurosurgery on call pager.    * * * [phone] then job code [id number]

## 2020-01-10 NOTE — CONSULTS
Urology Consult    Name: Jyoti De Jesus    MRN: [id number]   YOB: 1928               Chief Complaint:   Urinary Incontinence    History is obtained from the patient and chart review          History of Present Illness:   Jyoti De Jesus is a 89 year old female with no prior urologic history who initially presented to the ED after a fall from standing on 12/14/2019. She was evaluated clinically and radiographically at this time with no evidence of injury and she was cleared to begin ambulation. She had severe low back pain with ambulation for the next few weeks and required a walked. Approximately 2 weeks after her fall she noted acute onset urinary incontinence, and perineal numbness. She denies any urinary complaints prior to her fall. She ultimately presented to the ED on 1/4/2020 for further evaluation.     Neurologic exam in the ED was unremarkable and perineal sensation and rectal tone were reportedly intact. PVR was 20 mL. A CT was performed which demonstrated bilateral sacral alar fractures that extended to the neuroforamina of S1 and S2 on CT. MRI was also performed which demonstrated a CSF leak adjacent to the S2-3 nerve root. Neurosurgery was consulted and did not feel her urinary symptoms were attributable to cauda equina syndrome. She was taken to the OR on 1/5 for ORIF of her sacrum and L4 cement augmentation. There were no apparent intraoperative complications. A last catheter was placed intraoperatively which was discontinued on 1/8/2020.      Since the catheter was removed, Mrs. De Jesus reports her incontinence has returned- the same symptoms as prior to surgery. She is not able to tell when her bladder is full and she finds herself spontaneously leaking. When she sits on the toilet she feels like she has to strain to get the urine out. UA was not consistent with infection. PVR this afternoon was 371 mL.         Past Medical History:   History reviewed. No pertinent  past medical history.         Past Surgical History:     Past Surgical History:   Procedure Laterality Date     OPEN REDUCTION INTERNAL FIXATION PELVIS N/A 1/5/2020    Procedure: OPEN REDUCTION INTERNAL FIXATION, FRACTURE, PELVIS, spinal instrumentation L4-S1, cement augmentation L4-L5, image guided surgery;  Surgeon: Wai Masno MD;  Location:  OR            Social History:     Social History     Tobacco Use     Smoking status: Former Smoker     Smokeless tobacco: Never Used   Substance Use Topics     Alcohol use: Yes     Comment: daily            Family History:   History reviewed. No pertinent family history.         Allergies:   No Known Allergies         Medications:     Current Facility-Administered Medications   Medication     acetaminophen (TYLENOL) tablet 650 mg     benzocaine-menthol (CEPACOL) 15-3.6 MG lozenge 1 lozenge     bisacodyl (DULCOLAX) Suppository 10 mg     brimonidine (ALPHAGAN) 0.2 % ophthalmic solution 2 drop     calcium carbonate 500 mg (elemental) (OSCAL;OYSTER SHELL CALCIUM) tablet 500 mg     cholecalciferol (VITAMIN D3) 5000 units (125 mcg) capsule 5,000 Units     dorzolamide-timolol (COSOPT) ophthalmic solution 2 drop     famotidine (PEPCID) tablet 20 mg     HYDROmorphone (PF) (DILAUDID) injection 0.2 mg     lactated ringers infusion     levothyroxine (SYNTHROID/LEVOTHROID) tablet 75 mcg     lidocaine (LMX4) cream     lidocaine 1 % 1 mL     LORazepam (ATIVAN) tablet 0.5 mg     melatonin tablet 3 mg     multivitamin w/minerals (THERA-VIT-M) tablet 1 tablet     naloxone (NARCAN) injection 0.1-0.4 mg     ondansetron (ZOFRAN-ODT) ODT tab 4 mg    Or     ondansetron (ZOFRAN) injection 4 mg     oxyCODONE (ROXICODONE) tablet 5 mg     polyethylene glycol (MIRALAX/GLYCOLAX) Packet 17 g     polyethylene glycol (MIRALAX/GLYCOLAX) Packet 17 g     potassium phosphate 15 mmol in D5W 250 mL intermittent infusion     potassium phosphate 20 mmol in D5W 250 mL intermittent infusion     potassium  phosphate 20 mmol in D5W 500 mL intermittent infusion     potassium phosphate 25 mmol in D5W 500 mL intermittent infusion     senna-docusate (SENOKOT-S/PERICOLACE) 8.6-50 MG per tablet 1-2 tablet     sodium chloride (PF) 0.9% PF flush 3 mL     sodium chloride (PF) 0.9% PF flush 3 mL     sodium chloride 0.9% infusion     sodium phosphate (FLEET ENEMA) 1 enema             Review of Systems:    ROS: 10 point ROS neg other than the symptoms noted above in the HPI           Physical Exam:   VS:  T: 97.8    HR: 86    BP: 143/82    RR: 16   GEN:  AOx3.  NAD.  Appears at least 10 years younger than her stated age.   CV:  RRR, well perfused  LUNGS: Non-labored breathing on room air  ABD:  Soft.  NT.  ND.  No rebound or guarding.  No masses.  EXT:  Warm, well perfused.  No edema. Full strength and sensation in bilateral LE.  SKIN:  Warm.  Dry.  No rashes.          Data:   All laboratory data reviewed:    Recent Labs   Lab 01/08/20  0638 01/06/20  0811 01/04/20  2200   WBC 8.5 8.0 9.3   HGB 8.5* 8.2* 12.0    139* 210     Recent Labs   Lab 01/09/20  0823 01/08/20  0638 01/07/20  0558 01/06/20  0811 01/04/20  2200   NA  --  137  --  136 133   POTASSIUM  --  4.2  --  4.1 4.1   CHLORIDE  --  107  --  104 101   CO2  --  25  --  21 27   BUN  --  14  --  15 19   CR  --  0.85  --  0.73 0.92   GLC  --  90 109* 89 95   SAMMIE  --  8.0* 7.2* 7.3* 9.2   MAG  --   --  1.9  --   --    PHOS 2.6 2.1* 2.3*  --   --      Recent Labs   Lab 01/05/20  0230   COLOR Light Yellow   APPEARANCE Clear   URINEGLC Negative   URINEBILI Negative   URINEKETONE 10*   SG 1.008   URINEPH 5.5   PROTEIN Negative   NITRITE Negative   LEUKEST Negative            Impression and Plan:   Impression:   Jyoti De Jesus is a 89 year old female with no prior urologic history and recent bilateral sacral alar fractures following a fall in mid-December 2019. 2 weeks after her fall she began to experience decreased perineal sensation and urinary incontinence.  She received a last catheter after her ORIF on 1/5 which was removed on POD#3 and since that time she has experienced urinary incontinence, inability to sense when her bladder is full, and urinary retention. PVR is 371 mL today.     The timecourse of her symptoms in relation to a spinal injury are suspicious for a neurogenic cause of her voiding dysfunction. Her symptoms do not appear to be worsening since surgery. I had an in-depth discussion regarding neurogenic bladder with the patient and her daughter today. She still likely has a great degree of inflammation in her spinal nerves from her injury and recent surgery and her symptoms may improve spontaneously with time. Right now, it is most important to ensure she is emptying her bladder effectively. Should she continue to retain urine, we discussed options for management, including clean intermittent catheterization vs indwelling catheter placement. This patient has excellent mental capacity and motor function and I believe she would be an appropriate candidate to learn self catheterization.       Plan:     - Nursing to check PVRs after each void. If PVR is > 300 mL, the patient should be straight-catheterized    - If the patient is consistently retaining > 200 mL at the time of discharge, she should be taught to self-catheterize at least 4 times per day    - We will schedule outpatient urology follow up in 1-2 months to reassess symptoms. We will discuss the utility of urodynamics testing at this time.    - Urology will sign off. Please contact resident/PA on call with any questions or concerns.     This patient's exam findings, labs, and imaging discussed with urology staff surgeon Dr. Chacko, who developed the treatment plan.    Amanda Vasquez MD  PGY-2 Urology  Pager [phone]

## 2020-01-10 NOTE — TELEPHONE ENCOUNTER
13-[employer] Ph: [phone]     Benefit plan: 1646-[employer] Ph: [phone]    [id number] SHAHNAZ LARA: Actual coverage level is determined when they receive the claim:  01/01/2020 [facility]  pharmacy benefit manager;  Prescriber Assistance Help Line:

## 2020-01-10 NOTE — TELEPHONE ENCOUNTER
----- Message from Karen Salgado MD sent at 1/10/2020  1:27 PM CST -----  Regarding: insurance coverage for osteoporosis therapies  This patient is currently in the hospital following hip fracture. We are deciding about osteoporosis therapies and wondering about insurance coverage for Forteo, Tymlos, Evenity, Reclast. Are you able to help us figure out what her coverage and copays would be? Advice about the best way to go about getting this information?     Karen

## 2020-01-10 NOTE — PLAN OF CARE
VSS. AO1/SBA. Activity increased to bathroom privileges and walking in room with walker only. LS clear, no CP/SOB. Sats on RA >95%. Drsgs CDI. Pain tolerated with tylenol x1. Declined oxy d/t bowels. Labs: Pt on 24hr Urine collection order that has NOT begun d/t loss of sample and contamination; needs to be initiated. UA/UC verbal read-back order placed, collected and resulted. Endocrine consult for bone health/osteoporosis and Neurosurgery consult placed by Dr. Mason concerning pt's lack of sensation upon voiding and bowel movement. Pt has no PIVs. Skin intact ex incisions and skin tear R arm covered by tegaderm. Plan: Lab results and consults complete, continue to assess sensation in pubic area per pt. Then possibly TCU.

## 2020-01-10 NOTE — PROGRESS NOTES
Orthopaedic Surgery Progress Note 01/010/2020    E: No acute events overnight. Diarrhea after bowel regimen.    S: POD5. Working with PT/OT bed to chair.  Pain controlled. Denies numbness or tingling. Denies cp/sob/n/v. Tolerating oral diet. +BM +flatus. Voiding spontaneously. Plan of care reviewed and questions answered.    O:  Temp: 98.2  F (36.8  C) Temp src: Oral BP: 119/64 Pulse: 81 Heart Rate: 87 Resp: 16 SpO2: 97 % O2 Device: None (Room air)      Exam:    Gen: NAD. Resting comfortably in bed  Resp: Breathing comfortably on RA  Musculoskeletal: Dressing is c/d/i      Lower extremities:  Motor Strength Right Left   Hip flexion: L1, L2, L3 4/5 4/5   Hip adduction: L2, L3 5/5 5/5   Knee flexion: S1 5/5 5/5   Knee extension: L3, L4 5/5 5/5   Ankle dosiflexion: L4, L5 5/5 5/5   EHL: L5 4/5 4/5   Ankle plantarflexion: S1 5/5 5/5      Sensation from L1-S2 is preserved.       Recent Labs   Lab 01/08/20  0638 01/06/20  0811 01/04/20  2200   WBC 8.5 8.0 9.3   HGB 8.5* 8.2* 12.0    139* 210       Assessment and Plan: Jyoti De Jesus is a 89 year old female now s/p spino-pelvic fixation on 1/5/2020 with Dr. Mason.      Goals Today:  - PT/OT as tolerated, okay to ambulate in room with walker  - Follow up on Endocrinology recommendations     Orthopedics Primary  Activity: No HOB restrictions.  Bed to chair only x3 days.  Then up with assistive device (walker).  No excessive bending or twisting. No lifting >10 lbs x 6 weeks. No Juan lift for transfers.   Weight bearing status: Protected WB.  Pain management: Transition from IV to PO as tolerated. No NSAIDs.   Antibiotics: Ancef x24 hours - completed.  Diet: Begin with clear fluids and progress diet as tolerated.   DVT prophylaxis: SCDs only. No chemical DVT ppx needed.  Imaging: XR Upright PTDC - completed and reviewed.  Labs: Labs PRN.  Bracing/Splinting: None.  Dressings: Keep Aquacel c/d/i x 7 days.  Drains: HV removed 1/8/2020.  Pino catheter: Removed  per protocol.   Physical Therapy/Occupational Therapy: Eval and treat.  Cultures: None.  Consults: Hospitalist, Endocrinology - appreciate assistance in caring for patient.  Follow-up: Clinic with Dr. Mason in 6 weeks with repeat x-rays.   Disposition: Pending progress with therapies, pain control on orals, and medical stability, anticipate discharge to rehab on POD #5-7.       Future Appointments   Date Time Provider Department Center   1/9/2020  8:00 AM Marbin Oshea, PT [facility]   1/9/2020  1:30 PM Caroline Hooks, OT [facility]   1/9/2020  3:15 PM Marbin Oshea, PT [facility]   2/20/2020 12:30 PM Wai Mason MD [facility]   3/19/2020  2:45 PM aWi Mason MD [facility]       Patient discussed with Dr. Isabella Tijerina MD, PhD  Orthopedic Surgery PGY4  Pager [phone]    Please page me directly with any questions/concerns during regular weekday hours before 5pm. If there is no response, if it is a weekend, or if it is during evening hours then please page the orthopaedic surgery resident on call.

## 2020-01-10 NOTE — TELEPHONE ENCOUNTER
[facility] Call Center    Phone Message    May a detailed message be left on voicemail: yes    Reason for Call: Other: Pt would like to be seen by Dr. Salgado for HFU. Pt is currently admitted into the hospital and will discharge to rehab next week. Rachel from [facility] stated that if there is any other concerns please call at [phone]. Please call back pt to schedule pt in. Thtanks.     Action Taken: Message routed to:  [facility] ([facility] Stacy

## 2020-01-10 NOTE — TELEPHONE ENCOUNTER
Spoke w/ Dr Salgado, forwarded information from Pts pharmacy benefit manager [facility] to please place order, initiate PA and determine if alternative is necessary at that time.   Shonna Busby RN on 1/10/2020 at 3:50 PM

## 2020-01-10 NOTE — PLAN OF CARE
"      VS:   /69 (BP Location: Left arm)   Pulse 79   Temp 99.2  F (37.3  C) (Oral)   Resp 16   Ht 1.594 m (5' 2.75\")   Wt 57.6 kg (127 lb)   SpO2 97%   Breastfeeding No   BMI 22.68 kg/m   Room air     Output:   Voiding in commode, brief in place, some incontinence in brief overnight. Multiple small bowel movements, loose. Passing gas.   Activity:   Up to pivot to commode with assist X 1, walker and gait belt   Skin: Intact ex incision. Multiple bruises on extremities.   Pain:   Managed overnight with 5 mg oxy   Neuro/CMS:   A&O x 4, denies any numbness or tingling   Dressing(s):   Aquacel and primapore CDI   Diet:   Regular   LDA:   none   Equipment: Walker, PCD's, commode   Plan:   Continue plan of care, pt is able to make needs known, call light within reach.    Additional Info:   24 hour urine collection recommended        "

## 2020-01-10 NOTE — PROGRESS NOTES
"[facility], [place]   Internal Medicine Daily Note           Interval History/Events     Overnight events reviewed  Feeling well this AM  Had 4-5 BM (loose) last night after she got suppository  Denies any nausea, vomiting, abdominal pain  Denies any fever, chills, cough  Denies any shortness of breath or chest pain  No burning urination         Review of Systems        4 point ROS including Respiratory, CV, GI and , other than that noted above is negative      Medications   I have reviewed current medications  in the \"current medication\" section of Epic.  Relevant changes include:     Physical Exam   General:       Vital signs:    Blood pressure (!) 143/82, pulse 86, temperature 97.8  F (36.6  C), temperature source Oral, resp. rate 16, height 1.594 m (5' 2.75\"), weight 57.6 kg (127 lb), SpO2 99 %, not currently breastfeeding.  Estimated body mass index is 22.68 kg/m  as calculated from the following:    Height as of this encounter: 1.594 m (5' 2.75\").    Weight as of this encounter: 57.6 kg (127 lb).      Intake/Output Summary (Last 24 hours) at 1/7/2020 1233  Last data filed at 1/7/2020 0941  Gross per 24 hour   Intake 360 ml   Output 2370 ml   Net -2010 ml      Constitutional: Sitting on chair in no acute distress  Eye: No icterus, no pallor  Mouth/ENT: Moist oral mucosa  Respiratory: B/l  CTA  Cardiovascular: S1, S2 normal. No pretibial edema  GI: Soft, NT, BS+  : No last's catheter  Neuro: Alert, awake, and conversing. Normal strength in all four extremities  Psych: Normal mood  Integumentary: No rashes  MSK:  Heme/Lymph:     Laboratory and Imaging Studies     I have reviewed  laboratory and imaging studies in the Epic. Pertinent findings are as below:    BMP  Recent Labs   Lab 01/08/20  0638 01/07/20  0558 01/06/20  0811 01/04/20  2200     --  136 133   POTASSIUM 4.2  --  4.1 4.1   CHLORIDE 107  --  104 101   SAMMIE 8.0* 7.2* 7.3* 9.2   CO2 25  --  21 27   BUN 14  --  15 " 19   CR 0.85  --  0.73 0.92   GLC 90 109* 89 95     CBC  Recent Labs   Lab 01/08/20  0638 01/06/20  0811 01/04/20  2200   WBC 8.5 8.0 9.3   RBC 2.41* 2.36* 3.41*   HGB 8.5* 8.2* 12.0   HCT 26.1* 25.5* 35.7   * 108* 105*   MCH 35.3* 34.7* 35.2*   MCHC 32.6 32.2 33.6   RDW 12.8 12.8 12.6    139* 210     INR  Recent Labs   Lab 01/04/20  2200   INR 1.06     LFTs  Recent Labs   Lab 01/04/20  2200   ALKPHOS 118   AST 18   ALT 21   BILITOTAL 0.5   PROTTOTAL 7.0   ALBUMIN 3.5      PANCNo lab results found in last 7 days.        Impression/Plan          89 year old female admitted on 1/4/2020 s/p following ORIF of fracture pelvis and spinal instrumentation L4-S1, cement augmentation L4-l5    She has a known H/O Hypothyroidism and glaucoma.  Internal medicine consulted for post operative co management.  Individual problems and their management are outlined below     # s/p  ORIF of fracture pelvis and spinal instrumentation L4-S1, cement augmentation L4-L5 on 01/05/2020  EBL at 275 ml.  Management primarily per Ortho team.  - Wound cares, Dressings, Surgical pain management, DVT Prophylaxis  per primary team.   - Monitor anemia, hemodynamics, Input/Output, Capnography (for 24 hours)  - Encourage Incentive spirometry  - Laxatives for constipation prophylaxis     # Anemia: Most likely due to Hemodilution, blood loss, and post surgical inflammation.  Hg 8.5 gm/dl on 01/08/2020  Asymptomatic   - Transfuse if Hg < 7 gm/dl      # Hypothyroidism:   Continue PTA Levothyroxine 75 mcg/day    # Osteoporosis: Evaluated by Endocrinology. Recommend 24 hour urine calcium, and Creatinine. Patient going to consider therapy options     # Glaucoma:   Ct PTA eye drops.     # Hypocalcemia: Albumin normal. Calcium 7.2 on 01/07.   Calcium carbonate increased to 500 mg TID from BID.   Calcium level 8 on 01/08  - Continue Calcium carbonate 500 mg TID    # Hypophosphatemia: level 2.3 on 01/08. Placed on protocol, and replaced  Phosphorus  level 2.1 on 01/08 ,and normal level on 2.6 on 01/09  She was started on Neutraphos 1 pack TID  - Discontinue Neutraphos as level is normal    # Thrombocytopenia: most likely d/t surgery  Level normal on 01/08  - monitor     # Diet: Regular Diet Adult    # DVT Prophylaxis: Pneumatic Compression Devices  # Pino Catheter: in place, indication: Wound Healing  # Code Status: Full Code         Pt's care was discussed with bedside RN, patient and  during Care Team Rounds.               Roger Gomez MD  Hospitalist ( Internal medicine)  Pager: [phone]

## 2020-01-10 NOTE — PLAN OF CARE
"  VS: Vital signs stable   O2: >95% on RA   Output: Voiding adequately in commode. Dribbles when transferring from bed to commode. Brief in place.   Last BM: 1/2. Pt received suppository from previous nurse. Per nurse report she could feel the stool. Passing large amounts of flatus. Pt states \"the rumbling is picking up\". Pt sat on commode numerous times during shift without any success.   Activity: Assist x1 with walker and gait belt. Pt on limited activity until MD assess xray taken this afternoon. Transfers from bed to chair/commode smoothly.    Skin: Multiple bruises on extremities. Skin tear on r-arm covered with clear dressing.    Pain: Pt denied pain when still and minimal discomfort when moving. Denied the need for pain medications throughout shift.    CMS: Minimal numbness in duane-area. Pt can feel when she passes gas.    Dressing: Aquacel, CDI.   Diet: Regular tolerating well.   LDA: No iv access. Access was lost this AM. Pt is a hard stick and bruises easily. No IV needs at this time.        "

## 2020-01-10 NOTE — PLAN OF CARE
"      VS:   BP (!) 143/82 (BP Location: Right arm)   Pulse 86   Temp 97.8  F (36.6  C) (Oral)   Resp 16   Ht 1.594 m (5' 2.75\")   Wt 57.6 kg (127 lb)   SpO2 99%   Breastfeeding No   BMI 22.68 kg/m       Output:   Pt voiding well, BM today. Numbness in perineal area, instructions to obtain PVR x3 every shift. This shift 371, 438, 355    24 hour urine collection started at 2028, jug is in ice in pt bathroom.    Lungs Lung sounds are clear and equal bilaterally   Activity:   Pt standby assist with the a walker. Activity restriction, not to walk outside of room.   Skin: WDL ex incision, abrasion on R forearm.   Pain:   Controled with PRN tylenol. Oxy available, has been avoiding   Neuro/CMS:   CMS intact, numbness since fall in pubic area - monitoring closely. A&O x4.    Dressing(s):   CDI    Diet:   Regular diet, tolerating well   LDA:   No IV access.   Equipment:   Walker, call light, bladder scanner in use   Plan:   Continue to monitor for retention. Bladder scan every few (3) hours, orders to straight cath for retention over 300.     asses neuro symptoms in perennial area.    Additional Info:        "

## 2020-01-11 ENCOUNTER — APPOINTMENT (OUTPATIENT)
Dept: PHYSICAL THERAPY | Facility: CLINIC | Age: 85
DRG: 516 | End: 2020-01-11
Payer: COMMERCIAL

## 2020-01-11 ENCOUNTER — APPOINTMENT (OUTPATIENT)
Dept: OCCUPATIONAL THERAPY | Facility: CLINIC | Age: 85
DRG: 516 | End: 2020-01-11
Payer: COMMERCIAL

## 2020-01-11 LAB
CALCIUM 24H UR-MRATE: NORMAL G/24 H (ref 0.1–0.3)
CALCIUM UR-MCNC: <5 MG/DL
CALCIUM/CREAT UR: NORMAL G/G CR
COLLECT DURATION TIME UR: NORMAL H
CREAT 24H UR-MRATE: NORMAL G/(24.H) (ref 0.8–1.8)
CREAT UR-MCNC: 21 MG/DL
SPECIMEN VOL UR: NORMAL ML

## 2020-01-11 PROCEDURE — 97110 THERAPEUTIC EXERCISES: CPT | Mod: GP

## 2020-01-11 PROCEDURE — 97535 SELF CARE MNGMENT TRAINING: CPT | Mod: GO | Performed by: OCCUPATIONAL THERAPIST

## 2020-01-11 PROCEDURE — 25000132 ZZH RX MED GY IP 250 OP 250 PS 637: Performed by: STUDENT IN AN ORGANIZED HEALTH CARE EDUCATION/TRAINING PROGRAM

## 2020-01-11 PROCEDURE — 25000132 ZZH RX MED GY IP 250 OP 250 PS 637: Performed by: INTERNAL MEDICINE

## 2020-01-11 PROCEDURE — 99231 SBSQ HOSP IP/OBS SF/LOW 25: CPT | Performed by: INTERNAL MEDICINE

## 2020-01-11 PROCEDURE — 25000132 ZZH RX MED GY IP 250 OP 250 PS 637: Performed by: ORTHOPAEDIC SURGERY

## 2020-01-11 PROCEDURE — 82340 ASSAY OF CALCIUM IN URINE: CPT | Performed by: INTERNAL MEDICINE

## 2020-01-11 PROCEDURE — 12000001 ZZH R&B MED SURG/OB UMMC

## 2020-01-11 PROCEDURE — 97530 THERAPEUTIC ACTIVITIES: CPT | Mod: GP

## 2020-01-11 PROCEDURE — 97116 GAIT TRAINING THERAPY: CPT | Mod: GP

## 2020-01-11 PROCEDURE — 81050 URINALYSIS VOLUME MEASURE: CPT | Performed by: INTERNAL MEDICINE

## 2020-01-11 RX ADMIN — Medication 5000 UNITS: at 08:25

## 2020-01-11 RX ADMIN — DORZOLAMIDE HYDROCHLORIDE AND TIMOLOL MALEATE 2 DROP: 20; 5 SOLUTION/ DROPS OPHTHALMIC at 08:24

## 2020-01-11 RX ADMIN — BRIMONIDINE TARTRATE 2 DROP: 2 SOLUTION/ DROPS OPHTHALMIC at 08:24

## 2020-01-11 RX ADMIN — CALCIUM 500 MG: 500 TABLET ORAL at 08:25

## 2020-01-11 RX ADMIN — MULTIPLE VITAMINS W/ MINERALS TAB 1 TABLET: TAB at 21:20

## 2020-01-11 RX ADMIN — FAMOTIDINE 20 MG: 10 TABLET, COATED ORAL at 08:25

## 2020-01-11 RX ADMIN — DORZOLAMIDE HYDROCHLORIDE AND TIMOLOL MALEATE 2 DROP: 20; 5 SOLUTION/ DROPS OPHTHALMIC at 21:24

## 2020-01-11 RX ADMIN — BRIMONIDINE TARTRATE 2 DROP: 2 SOLUTION/ DROPS OPHTHALMIC at 21:29

## 2020-01-11 RX ADMIN — CALCIUM 500 MG: 500 TABLET ORAL at 18:58

## 2020-01-11 RX ADMIN — CALCIUM 500 MG: 500 TABLET ORAL at 14:43

## 2020-01-11 RX ADMIN — LEVOTHYROXINE SODIUM 75 MCG: 25 TABLET ORAL at 08:25

## 2020-01-11 ASSESSMENT — ACTIVITIES OF DAILY LIVING (ADL)
ADLS_ACUITY_SCORE: 12

## 2020-01-11 NOTE — PROGRESS NOTES
"[facility], [place]   Internal Medicine Daily Note           Interval History/Events     Overnight events reviewed  Reports doing well  Bowels are little loose  No nausea, vomiting, abdominal pain, tolerating diet  No lightheadedness or dizziness  No fever, chills  No burning urination       Review of Systems        4 point ROS including Respiratory, CV, GI and , other than that noted above is negative      Medications   I have reviewed current medications  in the \"current medication\" section of Epic.  Relevant changes include:     Physical Exam   General:       Vital signs:    Blood pressure 135/83, pulse 86, temperature 97.3  F (36.3  C), temperature source Oral, resp. rate 18, height 1.594 m (5' 2.75\"), weight 57.6 kg (127 lb), SpO2 98 %, not currently breastfeeding.  Estimated body mass index is 22.68 kg/m  as calculated from the following:    Height as of this encounter: 1.594 m (5' 2.75\").    Weight as of this encounter: 57.6 kg (127 lb).      Intake/Output Summary (Last 24 hours) at 1/7/2020 1233  Last data filed at 1/7/2020 0941  Gross per 24 hour   Intake 360 ml   Output 2370 ml   Net -2010 ml      Constitutional: Laying on bed in no acute distress  Eye: No icterus, no pallor  Mouth/ENT: Moist oral mucosa  Respiratory: B/l  CTA  Cardiovascular: S1, S2 normal. No pretibial edema  GI: Soft, NT, BS+  : No last's catheter  Neuro: Alert, awake, and conversing. Normal strength in all four extremities  Psych: Normal mood  Integumentary: No rashes  MSK:  Heme/Lymph:     Laboratory and Imaging Studies     I have reviewed  laboratory and imaging studies in the Epic. Pertinent findings are as below:    BMP  Recent Labs   Lab 01/08/20  0638 01/07/20  0558 01/06/20  0811 01/04/20  2200     --  136 133   POTASSIUM 4.2  --  4.1 4.1   CHLORIDE 107  --  104 101   SAMMIE 8.0* 7.2* 7.3* 9.2   CO2 25  --  21 27   BUN 14  --  15 19   CR 0.85  --  0.73 0.92   GLC 90 109* 89 95     CBC  Recent " Labs   Lab 01/08/20  0638 01/06/20  0811 01/04/20  2200   WBC 8.5 8.0 9.3   RBC 2.41* 2.36* 3.41*   HGB 8.5* 8.2* 12.0   HCT 26.1* 25.5* 35.7   * 108* 105*   MCH 35.3* 34.7* 35.2*   MCHC 32.6 32.2 33.6   RDW 12.8 12.8 12.6    139* 210     INR  Recent Labs   Lab 01/04/20  2200   INR 1.06     LFTs  Recent Labs   Lab 01/09/20  0823 01/04/20  2200   ALKPHOS  --  118   AST  --  18   ALT  --  21   BILITOTAL  --  0.5   PROTTOTAL  --  7.0   ALBUMIN 2.6* 3.5      PANCNo lab results found in last 7 days.        Impression/Plan          89 year old female admitted on 1/4/2020 s/p following ORIF of fracture pelvis and spinal instrumentation L4-S1, cement augmentation L4-l5    She has a known H/O Hypothyroidism and glaucoma.  Internal medicine consulted for post operative co management.  Individual problems and their management are outlined below     # s/p  ORIF of fracture pelvis and spinal instrumentation L4-S1, cement augmentation L4-L5 on 01/05/2020  EBL at 275 ml.  Management primarily per Ortho team.  - Wound cares, Dressings, Surgical pain management, DVT Prophylaxis  per primary team.   - Monitor anemia, hemodynamics, Input/Output, Capnography (for 24 hours)  - Encourage Incentive spirometry  - Laxatives for constipation prophylaxis     # Anemia: Most likely due to Hemodilution, blood loss, and post surgical inflammation.  Hg 8.5 gm/dl on 01/08/2020  Asymptomatic   - Transfuse if Hg < 7 gm/dl      # Hypothyroidism:   Continue PTA Levothyroxine 75 mcg/day    # Osteoporosis: Evaluated by Endocrinology. Recommend 24 hour urine calcium, and Creatinine. Patient going to consider therapy options  Urine collection started at 8 PM on 01/10.   - Follow up on labs     # Glaucoma:   Ct PTA eye drops.     # Hypocalcemia: Albumin normal. Calcium 7.2 on 01/07.   Calcium carbonate increased to 500 mg TID from BID.   Calcium level 8 on 01/08  - Continue Calcium carbonate 500 mg TID    # Hypophosphatemia: level 2.3 on  01/08. Placed on protocol, and replaced  Phosphorus level 2.1 on 01/08 ,and normal level on 2.6 on 01/09  She was started on Neutraphos 1 pack TID  - Discontinue Neutraphos as level is normal    # Thrombocytopenia: most likely d/t surgery  Level normal on 01/08  - monitor     # Diet: Regular Diet Adult    # DVT Prophylaxis: Pneumatic Compression Devices  # Pino Catheter: in place, indication: Wound Healing  # Code Status: Full Code         Pt's care was discussed with bedside RN, patient and  during Care Team Rounds.               Roger Gomez MD  Hospitalist ( Internal medicine)  Pager: [phone]

## 2020-01-11 NOTE — PLAN OF CARE
Discharge Planner PT   Patient plan for discharge: TCU.  Current status: Pt completes sit <> stand transfers with FWW and CGA. Pt ambulates short distances in room with FWW and CGA, slow pace, no overt LOB. Pt engaged in seated LE ROM/strengthening exercises.  Barriers to return to prior living situation: Medical needs, weakness, impaired balance, decreased activity tolerance, level of assist / current mobility, pain.  Recommendations for discharge: TCU.  Rationale for recommendations: Pt would benefit from TCU stay to progress strength, balance, activity tolerance, and mobility safety/IND.

## 2020-01-11 NOTE — PLAN OF CARE
"  VS:   /69 (BP Location: Left arm)   Pulse 86   Temp 98.1  F (36.7  C) (Oral)   Resp 18   Ht 1.594 m (5' 2.75\")   Wt 57.6 kg (127 lb)   SpO2 97%   Breastfeeding No   BMI 22.68 kg/m    LS clear, equal bilaterally. O2 sats stable on room air.   Output:   Last BM 1/11. Pt retaining urine. Frequent toileting offered, PVRs performed. Pt straight cathed for 650.    24-hour urine collection continued.   Activity:   Pt up with SBA and walker. Pt remained in bed for majority of shift.   Skin: Skin intact ex incision, abrasion on R arm. Areas of redness noted on face. Flaky scabbed areas noted on both lower legs.   Pain:   Denies   Neuro/CMS:   CMS intact. Numbness in pubic area unchanged. A&O x4.   Dressing(s):   Posterior dressings CDI. Tegaderm on R arm CDI.   Diet:   Regular diet   LDA:   No PIV access   Equipment:   Walker   Plan:   Continue plan of care.     "

## 2020-01-11 NOTE — PLAN OF CARE
Discharge Planner OT   Patient plan for discharge: TCU  Current status: SBA bed mobility. 3/3 spine precautions recalled. Reviewed LB dressing with pt, able to utilize figure 4 technique for doffing socks, used sock aide SBA w/ vc's for donning. Ambulating SBA-CGA slowly with FWW. Toilet transfer completed CGA with commode overlay, grab bar, and FWW. Standing g/h completed SBA. Per chart, pt is to limit mobilization to hospital room.  Barriers to return to prior living situation: pain, decreased strength and endurance, medical needs  Recommendations for discharge: TCU  Rationale for recommendations: To maximize safety and IND w/ ADLs and functional mobility prior to returning to previous living situation.       Entered by: Pillo Pinto 01/11/2020 3:42 PM

## 2020-01-11 NOTE — PLAN OF CARE
Patient is alert and oriented.  Denies complaints of pain.  Dressing to lower back is CDI.  Is voiding adequate amounts and PVRs have been low.  Is having smears of bowel with each voiding occurrence.  Is tolerating regular diet.  VSS.  Was up in the chair earlier this morning for a few hours.  Family has been here for most of the day.  Call light is within reach.  Is able to make needs known.  Will continue with plan of care.

## 2020-01-11 NOTE — PROGRESS NOTES
Orthopaedic Surgery Progress Note 01/010/2020    E: Patient requiring straight cath x 2 last evening.  Straight cath'd for 650 ml.    S: Working with PT/OT.  She reports pain is improved when getting up. Up with SBA and walker. Able to roll. Denies new or worsening numbness or tingling. Denies cp/sob Tolerating oral intake. +BM +flatus.     O:  Temp: 98.1  F (36.7  C) Temp src: Oral BP: 116/69 Pulse: 86 Heart Rate: 78 Resp: 18 SpO2: 97 % O2 Device: None (Room air)      Exam:    Gen: NAD. Resting comfortably in bed  Resp: Breathing comfortably on RA  Musculoskeletal: Dressing is c/d/i      Lower extremities:  Motor Strength Right Left   Hip flexion: L1, L2, L3 4/5 4/5   Hip adduction: L2, L3 5/5 5/5   Knee flexion: S1 5/5 5/5   Knee extension: L3, L4 5/5 5/5   Ankle dosiflexion: L4, L5 5/5 5/5   EHL: L5 4/5 5/5   Ankle plantarflexion: S1 5/5 5/5      Sensation from L1-S2 is preserved.       Recent Labs   Lab 01/08/20  0638 01/06/20  0811 01/04/20  2200   WBC 8.5 8.0 9.3   HGB 8.5* 8.2* 12.0    139* 210       Assessment and Plan: Jyoti De Jesus is a 89 year old female now s/p spino-pelvic fixation on 1/5/2020 with Dr. Mason.      Goals Today:  - PT/OT as tolerated, continue to progress activity as tolerated  - Continue PVR checks 4 times per days.    - Straight cath for PVR greater than 300 ml  - Follow up on Endocrinology recommendations     Orthopedics Primary  Activity: No HOB restrictions.  Bed to chair only x3 days.  Then up with assistive device (walker).  No excessive bending or twisting. No lifting >10 lbs x 6 weeks. No Juan lift for transfers.   Weight bearing status: Protected WB. BLE  Diet: diet as tolerated.   DVT prophylaxis: SCDs only. No chemical DVT ppx needed.  Labs: Labs PRN.  Bracing/Splinting: None.  Dressings: Keep Aquacel c/d/i x 7 days.  Drains: HV removed 1/8/2020.  Pino catheter: Removed per protocol.   Physical Therapy/Occupational Therapy: Eval and  treat.  Cultures: None.  Consults: Hospitalist, Endocrinology - appreciate assistance in caring for patient.  Follow-up: Clinic with Dr. Mason in 6 weeks with repeat x-rays.   Disposition: Pending progress with therapies, pain control on orals, and medical stability, anticipate discharge to rehab on POD #5-7. Not this weekend based recent urinary findings.       Future Appointments   Date Time Provider Department Center   1/9/2020  8:00 AM Marbin Oshea, PT [facility]   1/9/2020  1:30 PM Caroline Hooks, OT [facility]   1/9/2020  3:15 PM Marbin Oshea, PT [facility]   2/20/2020 12:30 PM Wai Mason MD Count includes the [facility]   3/19/2020  2:45 PM Wai Mason MD Count includes the [facility]       Patient discussed with Dr. Isabella Hirsch MD  Orthopedic Surgery PGY4  Pager [phone]    Please page me directly with any questions/concerns during regular weekday hours before 5pm. If there is no response, if it is a weekend, or if it is during evening hours then please page the orthopaedic surgery resident on call.

## 2020-01-11 NOTE — DISCHARGE SUMMARY
[facility], [place]  Orthopaedic Discharge Summary    Patient: Jyoti De Jesus MRN# [id number]   Age/Sex: 89 year old female YOB: 1928      Date of Admission:  1/4/2020  Date of Discharge:  1/14/2020  Admitting Physician:  Wai Mason MD  Discharge Physician:  Ousmane Tijerina MD  Primary Care Provider:  Jet Matt    DISCHARGE DIAGNOSIS:  Sacral insufficiency fracture, closed, with instability and possible cauda equina syndrome, Lumbar 5 transverse process fractures    PROCEDURE(S):   1/4/2020 - 1/5/2020 Procedure(s):  OPEN REDUCTION INTERNAL FIXATION, FRACTURE, PELVIS, spinal instrumentation L4-S1, cement augmentation L4-L5, image guided surgery     BRIEF HISTORY:  Jyoti De Jesus is a 89-year-old female who fell approximately 2 weeks prior to presentation and has had progressive worsening of pelvic pain and change in bowel/bladder control.  She also noted abnormal peroneal sensation.  She then presented to the emergency room where she was evaluated by Neurosurgery and Orthopedic Surgery.  She was found to have a sacral H-type insufficiency fracture on CT and MRI.  Recommendation was to fix the fracture as it appeared that there was a dynamic component to her instability that when she is supine her rectal exam appears to be relatively intact, but when she is sitting upright is when she seems to have her bowel/bladder function and sensory changes.  Extensive discussion about the alternative strategies, the risks, benefits and expected outcomes, and that no matter how this is treated, it is challenging because it is an osteoporotic insufficiency fracture.    HOSPITAL COURSE:    Patient was admitted on 1/4/2020 and underwent the above stated procedure(s). There were no complications and the patient was transferred to the PACU in stable condition with a last catheter and drain in place.  Patient received routine nursing cares on the floor.  Medicine  was consulted postoperatively to aid in management of medical comorbidities.  A clear liquid diet was started and advanced to regular on POD1. The last catheter was removed per protocol.  Due to urinary retention the patient had to be straight catheterized on several occasions.  When a suppository was given the patient had several episodes of diarrhea.  Urology and Neurosurgery was consulted to re-evaluate the patient postoperatively.  See separate notes for evaluation and recommendations.  PT/OT was initiated for safety training, ADLs, mobility and ROM.  ambulation was advanced slowly.  Drain(s) was removed when output was minimal.  After demonstrating the ability to tolerate a diet, pain control on PO pain meds, and clearance by PT/OT, patient was deemed medically safe for discharge to TCU on 1/14/2020.    Antibiotics:  Ancef given preop and 24 hours postop for prophylaxis.  DVT Prophylaxis:  Mechanical.  PT/OT Progress: Has met PT/OT goals for safe mobility.   Pain Medications: Weaned off all IV pain meds by time of discharge.  Inpatient Events: No significant events or complications.Postop anemia most    likely due to hemodilution, acute surgical blood loss, and    postoperative inflammation     DISCHARGE EXAM:  Gen: NAD, resting comfortably in bed  Resp: Breathing comfortably on RA  Musculoskeletal: Dressing is c/d/i      Lower extremities:  Motor Strength Right Left   Hip flexion: L1, L2, L3 4/5 4/5   Hip adduction: L2, L3 5/5 5/5   Knee flexion: S1 5/5 5/5   Knee extension: L3, L4 5/5 5/5   Ankle dosiflexion: L4, L5 5/5 5/5   EHL: L5 5/5 5/5   Ankle plantarflexion: S1 5/5 5/5      Sensation from L1-S2 is preserved.    DISCHARGE MEDICATIONS AND PROCEDURES:      Discharge Medication List as of 1/14/2020  1:57 PM      START taking these medications    Details   acetaminophen (TYLENOL) 325 MG tablet Take 2 tablets (650 mg) by mouth every 4 hours as needed for other (For optimal non-opioid multimodal pain  management to improve pain control and physical function.), Transitional      senna-docusate (SENOKOT-S/PERICOLACE) 8.6-50 MG tablet Take 1-2 tablets by mouth 2 times daily as needed for constipation, Disp-28 tablet, R-0, E-Prescribe         CONTINUE these medications which have CHANGED    Details   oxyCODONE (ROXICODONE) 5 MG tablet Take 1 tablet (5 mg) by mouth every 4 hours as needed for breakthrough pain or pain, Disp-60 tablet, R-0, Local Print         CONTINUE these medications which have NOT CHANGED    Details   aspirin 81 MG EC tablet Take 81 mg by mouth daily, Historical      brimonidine (ALPHAGAN-P) 0.15 % ophthalmic solution Place 2 drops into both eyes 2 times daily, Historical      calcium acetate (PHOSLO) 667 MG CAPS capsule Take 500 mg by mouth daily, Historical      cholecalciferol (VITAMIN D3) 5000 units (125 mcg) capsule Take 5,000 Units by mouth daily, Historical      dorzolamide-timolol (COSOPT) 2-0.5 % ophthalmic solution Place 2 drops into the right eye 2 times daily, Historical      levothyroxine (SYNTHROID/LEVOTHROID) 75 MCG tablet Take 75 mcg by mouth daily, Historical      multivitamin w/minerals (MULTI-VITAMIN) tablet Take 1 tablet by mouth daily, Historical               Discharge Procedure Orders   Reason for your hospital stay   Order Comments: You were in the hospital for spine surgery     Wound care (specify)   Order Comments: Site:   Back  Instructions:  Keep dressing clean, dry and intact for 5 days     Additional Discharge Instructions   Order Comments: You are being discharged from hospital cares.    Activities:   - Avoid excessive bending, twisting, or heavy lifting over 10 pounds.    Wound care:   - Your dressing is to remain in place until postoperative day 7 after which you may remove.  - Your stitches will dissolve on their own and do not need to be removed.    - You may shower keeping your incisions clean and dry. Once your dressing is off you may shower and let water run  over your incisions and dab dry, but do not submerge in water for at least 2 weeks and do not rub incisions.   - After the dressing is off, you may leave it open to air or apply a dressing for protection.     When to contact your medical team:  - Call or seek medical attention for pain that continues to worsen, new onset of numbness or tingling, or excessive swelling.  - See a medical provider for new onset of chest pain or shortness of breath; this may be a sign of a heart attack or blood clot in you lungs.  - Contact us if there is any increased drainage, swelling, pain, or redness stemming from your incision; this may be a sign of infection and would need to be looked at immediately.  - To speak with someone from Orthopaedic Surgery call [phone], ask for the orthopaedic resident on call, and provide a call back number.    Follow-up:   - For questions regarding your follow-up appointment at [facility] call [phone].     Activity - Up with assistive device   Order Comments: Walker     Order Specific Question Answer Comments   Is discharge order? Yes      Weight bearing status   Order Comments: As tolerated with assistive device     Adult [facility] Follow-up and recommended labs and tests   Order Comments: Follow up with Dr. Mason in clinic as scheduled    Appointments on [facility] and/or [facility] (with [facility] or [facility] provider or service). Call [phone] if you haven't heard regarding these appointments within 7 days of discharge.     Physical Therapy Adult Consult   Order Comments: Evaluate and treat as clinically indicated.    Reason:  Postop fixation of fragility sacral fracture     Occupational Therapy Adult Consult   Order Comments: Evaluate and treat as clinically indicated.    Reason:  Postop fixation of fragility sacral fracture     Fall precautions     Advance Diet as Tolerated   Order Comments: Follow this diet upon discharge: Regular     Order  Specific Question Answer Comments   Is discharge order? Yes      Assign Questionnaire Series to Patient         FOLLOWUP:    Future Appointments   Date Time Provider Department Center   2/20/2020 12:30 PM Wai Mason MD [facility]   3/19/2020  2:45 PM Wai Mason MD [facility]     Appointments at [facility]: [address] [place] [id number]  Please call [phone] if you haven't heard regarding these appointments within 7 days of discharge.    ATTESTATION:  I have reviewed today's vital signs, notes, medications, labs and imaging.    Ousmane Tijerina MD, PhD  Orthopaedic Surgery Resident, PGY4  [facility]

## 2020-01-12 ENCOUNTER — APPOINTMENT (OUTPATIENT)
Dept: ULTRASOUND IMAGING | Facility: CLINIC | Age: 85
DRG: 516 | End: 2020-01-12
Attending: INTERNAL MEDICINE
Payer: COMMERCIAL

## 2020-01-12 ENCOUNTER — APPOINTMENT (OUTPATIENT)
Dept: OCCUPATIONAL THERAPY | Facility: CLINIC | Age: 85
DRG: 516 | End: 2020-01-12
Payer: COMMERCIAL

## 2020-01-12 ENCOUNTER — APPOINTMENT (OUTPATIENT)
Dept: PHYSICAL THERAPY | Facility: CLINIC | Age: 85
DRG: 516 | End: 2020-01-12
Payer: COMMERCIAL

## 2020-01-12 PROCEDURE — 25000132 ZZH RX MED GY IP 250 OP 250 PS 637: Performed by: INTERNAL MEDICINE

## 2020-01-12 PROCEDURE — 12000001 ZZH R&B MED SURG/OB UMMC

## 2020-01-12 PROCEDURE — 99231 SBSQ HOSP IP/OBS SF/LOW 25: CPT | Performed by: INTERNAL MEDICINE

## 2020-01-12 PROCEDURE — 97535 SELF CARE MNGMENT TRAINING: CPT | Mod: GO

## 2020-01-12 PROCEDURE — 97110 THERAPEUTIC EXERCISES: CPT | Mod: GP

## 2020-01-12 PROCEDURE — 97116 GAIT TRAINING THERAPY: CPT | Mod: GP

## 2020-01-12 PROCEDURE — 25000132 ZZH RX MED GY IP 250 OP 250 PS 637: Performed by: STUDENT IN AN ORGANIZED HEALTH CARE EDUCATION/TRAINING PROGRAM

## 2020-01-12 PROCEDURE — 25000132 ZZH RX MED GY IP 250 OP 250 PS 637: Performed by: ORTHOPAEDIC SURGERY

## 2020-01-12 PROCEDURE — 93970 EXTREMITY STUDY: CPT

## 2020-01-12 PROCEDURE — 97530 THERAPEUTIC ACTIVITIES: CPT | Mod: GO

## 2020-01-12 PROCEDURE — 97530 THERAPEUTIC ACTIVITIES: CPT | Mod: GP

## 2020-01-12 RX ADMIN — SENNOSIDES AND DOCUSATE SODIUM 1 TABLET: 8.6; 5 TABLET ORAL at 19:25

## 2020-01-12 RX ADMIN — CALCIUM 500 MG: 500 TABLET ORAL at 19:25

## 2020-01-12 RX ADMIN — BRIMONIDINE TARTRATE 2 DROP: 2 SOLUTION/ DROPS OPHTHALMIC at 08:10

## 2020-01-12 RX ADMIN — OXYCODONE HYDROCHLORIDE 5 MG: 5 TABLET ORAL at 03:16

## 2020-01-12 RX ADMIN — BRIMONIDINE TARTRATE 2 DROP: 2 SOLUTION/ DROPS OPHTHALMIC at 19:29

## 2020-01-12 RX ADMIN — Medication 5000 UNITS: at 08:11

## 2020-01-12 RX ADMIN — FAMOTIDINE 20 MG: 10 TABLET, COATED ORAL at 08:11

## 2020-01-12 RX ADMIN — LEVOTHYROXINE SODIUM 75 MCG: 25 TABLET ORAL at 08:11

## 2020-01-12 RX ADMIN — MULTIPLE VITAMINS W/ MINERALS TAB 1 TABLET: TAB at 19:25

## 2020-01-12 RX ADMIN — CALCIUM 500 MG: 500 TABLET ORAL at 13:16

## 2020-01-12 RX ADMIN — CALCIUM 500 MG: 500 TABLET ORAL at 08:11

## 2020-01-12 RX ADMIN — DORZOLAMIDE HYDROCHLORIDE AND TIMOLOL MALEATE 2 DROP: 20; 5 SOLUTION/ DROPS OPHTHALMIC at 08:10

## 2020-01-12 RX ADMIN — DORZOLAMIDE HYDROCHLORIDE AND TIMOLOL MALEATE 2 DROP: 20; 5 SOLUTION/ DROPS OPHTHALMIC at 19:25

## 2020-01-12 ASSESSMENT — ACTIVITIES OF DAILY LIVING (ADL)
ADLS_ACUITY_SCORE: 12

## 2020-01-12 ASSESSMENT — MIFFLIN-ST. JEOR: SCORE: 954.87

## 2020-01-12 NOTE — PROGRESS NOTES
Orthopaedic Surgery Progress Note 01/010/2020    E: No acute events overnight.    S: Working with PT/OT walked in room yesterday. Patient reports more pain/soreness last evening. She notices swelling in lower extremities. Overall reports good mobilization. Denies new or worsening numbness or tingling. Denies cp/sob Tolerating oral intake. +BM +flatus.     O:  Temp: 98.1  F (36.7  C) Temp src: Oral BP: 124/66   Heart Rate: 73 Resp: 15 SpO2: 96 % O2 Device: None (Room air)      Exam:    Gen: NAD. Resting comfortably in bed  Resp: Breathing comfortably on RA  Musculoskeletal: Dressing is c/d/i      Lower extremities:  Motor Strength Right Left   Hip flexion: L1, L2, L3 4/5 4/5   Hip adduction: L2, L3 5/5 5/5   Knee flexion: S1 5/5 5/5   Knee extension: L3, L4 5/5 5/5   Ankle dosiflexion: L4, L5 5/5 5/5   EHL: L5 4/5 5/5   Ankle plantarflexion: S1 5/5 5/5      Sensation from L1-S2 is preserved.       Recent Labs   Lab 01/08/20  0638 01/06/20  0811   WBC 8.5 8.0   HGB 8.5* 8.2*    139*       Assessment and Plan: Jyoti eD Jesus is a 89 year old female now s/p spino-pelvic fixation on 1/5/2020 with Dr. Mason.      Goals Today:  - PT/OT as tolerated, continue to progress activity as tolerated  - Continue PVR checks 4 times per day.    - Straight cath for PVR greater than 300 ml     Orthopedics Primary  Activity: No HOB restrictions.  Bed to chair only x3 days.  Then up with assistive device (walker).  No excessive bending or twisting. No lifting >10 lbs x 6 weeks. No Juan lift for transfers.   Weight bearing status: Protected WB. BLE  Diet: diet as tolerated.   DVT prophylaxis: SCDs only. No chemical DVT ppx needed.  Labs: Labs PRN.  Bracing/Splinting: None.  Dressings: Keep Aquacel c/d/i x 7 days.  Drains: HV removed 1/8/2020.  Pino catheter: Removed per protocol.   Physical Therapy/Occupational Therapy: Eval and treat.  Cultures: None.  Consults: Hospitalist, Endocrinology - appreciate assistance in  caring for patient.  Follow-up: Clinic with Dr. Mason in 6 weeks with repeat x-rays.   Disposition: Pending progress with therapies, pain control on orals, and medical stability, anticipate discharge to rehab on POD #5-7. Not this weekend based recent urinary findings. Likely early next week.       Future Appointments   Date Time Provider Department Center   1/9/2020  8:00 AM Marbin Oshea, PT [facility] [place]   1/9/2020  1:30 PM Caroline Hooks, OT [facility] [place]   1/9/2020  3:15 PM Marbin Oshea, PT [facility] [place]   2/20/2020 12:30 PM Wai Mason MD [facility]   3/19/2020  2:45 PM Wai Mason MD [facility]       Patient discussed with Dr. Isabella Hirsch MD  Orthopedic Surgery PGY4  Pager [phone]    Please page me directly with any questions/concerns during regular weekday hours before 5pm. If there is no response, if it is a weekend, or if it is during evening hours then please page the orthopaedic surgery resident on call.

## 2020-01-12 NOTE — PLAN OF CARE
Discharge Planner PT   Patient plan for discharge: TCU.  Current status: Pt completes sit <> stand transfers with FWW and SBA. Pt ambulates short distances in room with FWW and SBA, slow pace, no LOB. Pt engaged in standing LE strengthening exercises.  Barriers to return to prior living situation: Medical needs, weakness, impaired balance, decreased activity tolerance, level of assist / current mobility, pain.  Recommendations for discharge: TCU.  Rationale for recommendations: Pt would benefit from TCU stay to progress strength, balance, activity tolerance, and mobility safety/IND.

## 2020-01-12 NOTE — PLAN OF CARE
VS:   Stable; lungs CTA   Output:   Patient woken up every 4 hours to void. Voided 400 mL at 0030 with . Patient reported soft stool yesterday and refused senna this evening. Passing lots of gas overnight; smears noted in brief x2. Later pt voided 450 mL after voiding a small amount in brief. PVR was 177.   Activity:   Up with walker, gait belt, and SBA.   Skin: Dry, flaky, some bruising, abrasions, and scabbing observed.   Pain:   Denies   Neuro/CMS:   Intact; patient denies numbness and/or tingling    Dressing(s):   CDI   Diet:   Regular   LDA:   None   Equipment:   Walker and gait belt at bedside    Plan:   Continue POC. Patient able to make needs known.   Additional Info:

## 2020-01-12 NOTE — PROGRESS NOTES
"[facility], [place]   Internal Medicine Daily Note           Interval History/Events     Overnight events reviewed  Reports doing well  Noticed some swelling on the leg yesterday with some pain  No nausea, vomiting, lightheadedness or dizziness  No fever, chills  No chest pain, shortness of breaht  Frequent loose bowel movements  No burning urination       Review of Systems        4 point ROS including Respiratory, CV, GI and , other than that noted above is negative      Medications   I have reviewed current medications  in the \"current medication\" section of Epic.  Relevant changes include:     Physical Exam   General:       Vital signs:    Blood pressure 135/71, pulse 86, temperature 98.1  F (36.7  C), temperature source Oral, resp. rate 16, height 1.594 m (5' 2.75\"), weight 57.5 kg (126 lb 11.2 oz), SpO2 96 %, not currently breastfeeding.  Estimated body mass index is 22.62 kg/m  as calculated from the following:    Height as of this encounter: 1.594 m (5' 2.75\").    Weight as of this encounter: 57.5 kg (126 lb 11.2 oz).      Intake/Output Summary (Last 24 hours) at 1/7/2020 1233  Last data filed at 1/7/2020 0941  Gross per 24 hour   Intake 360 ml   Output 2370 ml   Net -2010 ml      Constitutional: Laying on bed in no acute distress  Eye: No icterus, no pallor  Mouth/ENT: Moist oral mucosa  Respiratory: B/l  CTA  Cardiovascular: S1, S2 normal. B/l pretibial edema  GI: Soft, NT, BS+  : No last's catheter  Neuro: Alert, awake, and conversing. Normal strength in all four extremities  Psych: Normal mood  Integumentary: No rashes  MSK:  Heme/Lymph:     Laboratory and Imaging Studies     I have reviewed  laboratory and imaging studies in the Epic. Pertinent findings are as below:    BMP  Recent Labs   Lab 01/08/20  0638 01/07/20  0558 01/06/20  0811     --  136   POTASSIUM 4.2  --  4.1   CHLORIDE 107  --  104   SAMMIE 8.0* 7.2* 7.3*   CO2 25  --  21   BUN 14  --  15   CR 0.85  --  " 0.73   GLC 90 109* 89     CBC  Recent Labs   Lab 01/08/20  0638 01/06/20  0811   WBC 8.5 8.0   RBC 2.41* 2.36*   HGB 8.5* 8.2*   HCT 26.1* 25.5*   * 108*   MCH 35.3* 34.7*   MCHC 32.6 32.2   RDW 12.8 12.8    139*     INR  No lab results found in last 7 days.  LFTs  Recent Labs   Lab 01/09/20  0823   ALBUMIN 2.6*      PANCNo lab results found in last 7 days.        Impression/Plan          89 year old female admitted on 1/4/2020 s/p following ORIF of fracture pelvis and spinal instrumentation L4-S1, cement augmentation L4-l5    She has a known H/O Hypothyroidism and glaucoma.  Internal medicine consulted for post operative co management.  Individual problems and their management are outlined below     # s/p  ORIF of fracture pelvis and spinal instrumentation L4-S1, cement augmentation L4-L5 on 01/05/2020  EBL at 275 ml.  Management primarily per Ortho team.  - Wound cares, Dressings, Surgical pain management, DVT Prophylaxis  per primary team.   - Monitor anemia, hemodynamics, Input/Output, Capnography (for 24 hours)  - Encourage Incentive spirometry  - Laxatives for constipation prophylaxis     # Anemia: Most likely due to Hemodilution, blood loss, and post surgical inflammation.  Hg 8.5 gm/dl on 01/08/2020  Asymptomatic   - Transfuse if Hg < 7 gm/dl      # Hypothyroidism:   Continue PTA Levothyroxine 75 mcg/day    # Osteoporosis: Evaluated by Endocrinology. Recommend 24 hour urine calcium, and Creatinine. Patient going to consider therapy options  Urine collection for calcium done.   - Follow up on labs with Endocrinology     # Glaucoma:   Ct PTA eye drops.     # Hypocalcemia: Albumin normal. Calcium 7.2 on 01/07.   Calcium carbonate increased to 500 mg TID from BID.   Calcium level 8 on 01/08  - Continue Calcium carbonate 500 mg TID    # Hypophosphatemia: level 2.3 on 01/08. Placed on protocol, and replaced  Phosphorus level 2.1 on 01/08 ,and normal level on 2.6 on 01/09  She was started on  Neutraphos 1 pack TID  - Discontinue Neutraphos as level is normal    # Thrombocytopenia: most likely d/t surgery  Level normal on 01/08  - monitor    # B/l leg swelling: Will obtain US to rule out DVT  -CT stocking       # Diet: Regular Diet Adult    # DVT Prophylaxis: Pneumatic Compression Devices  # Pino Catheter: in place, indication: Wound Healing  # Code Status: Full Code         Pt's care was discussed with bedside RN, patient and  during Care Team Rounds.               Roger Gomez MD  Hospitalist ( Internal medicine)  Pager: [phone]

## 2020-01-12 NOTE — PLAN OF CARE
Discharge Planner OT   Patient plan for discharge: TCU  Current status: Reviewed LE dressing, and patient completes donning/doffing socks and pants without AE and utilizing figure 4 technique while remaining within precautions. Completes sit<> stand SBA and ambulates to bathroom with FWW and SBA. Stands at sink to complete morning g/h tasks for ~ 7 minutes demonstrating increased tolerance. Educated pt on safe shower transfer techniques with grab bars, and patient completes safely. Pt sponge bathes UB, requiring assist for back and LB. Left with all needs within reach.  Barriers to return to prior living situation: activity tolerance, medical needs  Recommendations for discharge: TCU  Rationale for recommendations: to maximize safety and IND with self-cares and functional mobility.       Entered by: Dk Randolph 01/12/2020 10:29 AM

## 2020-01-12 NOTE — PROGRESS NOTES
"SPIRITUAL HEALTH SERVICES  SPIRITUAL ASSESSMENT Progress Note  [facility] ([facility]) 10A     REFERRAL SOURCE: Spiritual consult    I visited with Jyoti family after attempting to visit twice but pt was not in the room. Pt spouse stated, \"She is down stairs doing ultrasound. We will tell her you visited, thank you for coming.\"    PLAN: SHS remains available to support pt ongoing needs per pt request.    Sujey Martinez  Chaplain Resident  Pager [phone]   "

## 2020-01-12 NOTE — PLAN OF CARE
Patient is alert and oriented.  Is up in her room using the walker with SBA.  Is voiding adequate amounts and PVRs have remained less than 200cc.  Denies complaints of pain.  Was up in her chair for a few hours this morning.  Feet remain swollen like yesterday.  Ultrasound of LE was done this afternoon and was negative for DVTs.  Family has been with patient in her room the entire day.  Is working appropriately with therapies.  Call light is within reach.  Is able to make needs known.  Will continue with plan of care.

## 2020-01-13 ENCOUNTER — APPOINTMENT (OUTPATIENT)
Dept: PHYSICAL THERAPY | Facility: CLINIC | Age: 85
DRG: 516 | End: 2020-01-13
Payer: COMMERCIAL

## 2020-01-13 ENCOUNTER — APPOINTMENT (OUTPATIENT)
Dept: OCCUPATIONAL THERAPY | Facility: CLINIC | Age: 85
DRG: 516 | End: 2020-01-13
Payer: COMMERCIAL

## 2020-01-13 ENCOUNTER — TELEPHONE (OUTPATIENT)
Dept: UROLOGY | Facility: CLINIC | Age: 85
End: 2020-01-13

## 2020-01-13 LAB
ANION GAP SERPL CALCULATED.3IONS-SCNC: 7 MMOL/L (ref 3–14)
BUN SERPL-MCNC: 17 MG/DL (ref 7–30)
CALCIUM SERPL-MCNC: 8.4 MG/DL (ref 8.5–10.1)
CHLORIDE SERPL-SCNC: 99 MMOL/L (ref 94–109)
CO2 SERPL-SCNC: 27 MMOL/L (ref 20–32)
CREAT SERPL-MCNC: 0.92 MG/DL (ref 0.52–1.04)
GFR SERPL CREATININE-BSD FRML MDRD: 54 ML/MIN/{1.73_M2}
GLUCOSE SERPL-MCNC: 126 MG/DL (ref 70–99)
MAGNESIUM SERPL-MCNC: 2.1 MG/DL (ref 1.6–2.3)
PHOSPHATE SERPL-MCNC: 3 MG/DL (ref 2.5–4.5)
POTASSIUM SERPL-SCNC: 4.3 MMOL/L (ref 3.4–5.3)
SODIUM SERPL-SCNC: 133 MMOL/L (ref 133–144)

## 2020-01-13 PROCEDURE — 97110 THERAPEUTIC EXERCISES: CPT | Mod: GP

## 2020-01-13 PROCEDURE — 25000132 ZZH RX MED GY IP 250 OP 250 PS 637: Performed by: STUDENT IN AN ORGANIZED HEALTH CARE EDUCATION/TRAINING PROGRAM

## 2020-01-13 PROCEDURE — 12000001 ZZH R&B MED SURG/OB UMMC

## 2020-01-13 PROCEDURE — 97535 SELF CARE MNGMENT TRAINING: CPT | Mod: GO | Performed by: OCCUPATIONAL THERAPIST

## 2020-01-13 PROCEDURE — 25000132 ZZH RX MED GY IP 250 OP 250 PS 637: Performed by: ORTHOPAEDIC SURGERY

## 2020-01-13 PROCEDURE — 83735 ASSAY OF MAGNESIUM: CPT | Performed by: INTERNAL MEDICINE

## 2020-01-13 PROCEDURE — 84100 ASSAY OF PHOSPHORUS: CPT | Performed by: INTERNAL MEDICINE

## 2020-01-13 PROCEDURE — 80048 BASIC METABOLIC PNL TOTAL CA: CPT | Performed by: INTERNAL MEDICINE

## 2020-01-13 PROCEDURE — 97116 GAIT TRAINING THERAPY: CPT | Mod: GP

## 2020-01-13 PROCEDURE — 36415 COLL VENOUS BLD VENIPUNCTURE: CPT | Performed by: INTERNAL MEDICINE

## 2020-01-13 PROCEDURE — 97530 THERAPEUTIC ACTIVITIES: CPT | Mod: GO | Performed by: OCCUPATIONAL THERAPIST

## 2020-01-13 PROCEDURE — 25000132 ZZH RX MED GY IP 250 OP 250 PS 637: Performed by: INTERNAL MEDICINE

## 2020-01-13 PROCEDURE — 99232 SBSQ HOSP IP/OBS MODERATE 35: CPT | Performed by: INTERNAL MEDICINE

## 2020-01-13 RX ADMIN — CALCIUM 500 MG: 500 TABLET ORAL at 08:20

## 2020-01-13 RX ADMIN — ACETAMINOPHEN 650 MG: 325 TABLET, FILM COATED ORAL at 02:05

## 2020-01-13 RX ADMIN — DORZOLAMIDE HYDROCHLORIDE AND TIMOLOL MALEATE 2 DROP: 20; 5 SOLUTION/ DROPS OPHTHALMIC at 08:22

## 2020-01-13 RX ADMIN — OXYCODONE HYDROCHLORIDE 5 MG: 5 TABLET ORAL at 22:15

## 2020-01-13 RX ADMIN — DORZOLAMIDE HYDROCHLORIDE AND TIMOLOL MALEATE 2 DROP: 20; 5 SOLUTION/ DROPS OPHTHALMIC at 20:43

## 2020-01-13 RX ADMIN — MULTIPLE VITAMINS W/ MINERALS TAB 1 TABLET: TAB at 20:43

## 2020-01-13 RX ADMIN — BRIMONIDINE TARTRATE 1 DROP: 2 SOLUTION/ DROPS OPHTHALMIC at 08:21

## 2020-01-13 RX ADMIN — LEVOTHYROXINE SODIUM 75 MCG: 25 TABLET ORAL at 08:20

## 2020-01-13 RX ADMIN — SENNOSIDES AND DOCUSATE SODIUM 1 TABLET: 8.6; 5 TABLET ORAL at 08:19

## 2020-01-13 RX ADMIN — Medication 5000 UNITS: at 08:20

## 2020-01-13 RX ADMIN — BRIMONIDINE TARTRATE 2 DROP: 2 SOLUTION/ DROPS OPHTHALMIC at 20:44

## 2020-01-13 RX ADMIN — CALCIUM 500 MG: 500 TABLET ORAL at 18:46

## 2020-01-13 RX ADMIN — ACETAMINOPHEN 650 MG: 325 TABLET, FILM COATED ORAL at 08:14

## 2020-01-13 RX ADMIN — CALCIUM 500 MG: 500 TABLET ORAL at 14:20

## 2020-01-13 RX ADMIN — FAMOTIDINE 20 MG: 10 TABLET, COATED ORAL at 08:20

## 2020-01-13 ASSESSMENT — ACTIVITIES OF DAILY LIVING (ADL)
ADLS_ACUITY_SCORE: 12

## 2020-01-13 ASSESSMENT — MIFFLIN-ST. JEOR: SCORE: 954.87

## 2020-01-13 ASSESSMENT — PAIN DESCRIPTION - DESCRIPTORS: DESCRIPTORS: SORE

## 2020-01-13 NOTE — PROGRESS NOTES
Social Work Services Progress Note    Hospital Day: 10  Collaborated with:  Chart review, pt, pt's daughter, pt's granddaughter, pt spouse    Data:  SW following for discharge planning. Pt likely ready to discharge today, waiting on final authorization from Dr. Mason.     Intervention:  Per family, they want [facility] upon discharge. SW left VM for [facility].    1) [facility] - ACCEPTED, $20 fee for private room. FACILITY SELECTED BY FAMILY.   [address] [place] [address]  [phone] main line, [phone] admissions    2) [facility] - ACCEPTED, $20 fee for private room - SW CANCELLED REFERRAL.   [address] [place] [address]  [phone]     Assessment:  Pt likely ready to discharge later today or tomorrow. Family requesting assistance with transportation.    Plan:    Anticipated Disposition:  Facility:  [facility]    Barriers to d/c plan:  Medical stability    Follow Up:  SW to continue following for discharge    YUDI Corral  5A/10A Ortho/Med/Surg & [facility] Adult [facility]  Phone: [phone] Pager: [phone]

## 2020-01-13 NOTE — TELEPHONE ENCOUNTER
----- Message from Haven Taylor RN sent at 1/13/2020  8:09 AM CST -----  Regarding: FW: New Patient Visit  Please call patient schedule her with Li in 2 months for neurogenic bladder  New patient  thanks  ----- Message -----  From: Amanda Vasquez MD  Sent: 1/10/2020   8:05 PM CST  To: Haven Taylor RN  Subject: New Patient Visit                                Hi Haven,    Would you please schedule a new patient visit with Dr. Jefferson or Dr. Jack in the next 1-2 months to discuss possible neurogenic bladder?    Thank you!    -Amanda

## 2020-01-13 NOTE — PLAN OF CARE
Nursing   post op spine  S- States back discomfort is minimal; rachel if she can be up out of bed.  Up in chair much of day, rests occas between  Pt requested shower today. Tolerated well.  Ambulated in room w wlker-brian well  Pt had tylenol x1- declined further.  After 2nd void PVR much better 6ml    + small BM  A- stable doing well.    R- plan for TCU when cleared by ortho   chair for meals,   pulm toilet

## 2020-01-13 NOTE — PLAN OF CARE
Discharge Planner OT   Patient plan for discharge: TCU  Current status: Pt is completing ADL's and functional mobility with SBA and cues with use of walker.  Barriers to return to prior living situation: Fatigue  Recommendations for discharge: TCU  Rationale for recommendations: Pt will benefit from continued OT to continue to progress level of independence to allow pt to return to previous living environment.       Entered by: Rafaela Colmenares 01/13/2020 4:11 PM

## 2020-01-13 NOTE — PROGRESS NOTES
Brief Endocrine progress note:    Patient chart reviewed.     Jyoti De Jesus is a 89 year old woman with history of hypothyroidism and osteoporosis now admitted after ORIF with pelvic fixation for sacral fracture. Fracture occurred after a fall from standing height and is consistent with a fragility fracture. She also has an age-indeterminate compression fracture seen on imaging, sustained without known trauma.      Osteoporosis was previously treated for ~5 years with oral bisphosphonate followed by drug holiday; unclear when was last dose of fosamax ?Summer 2019.  Currently taking calcium and vitamin D.     Relevant Labs:    Calcium today is 8.4 (Alb 2.6  On 1/9 and 3.5 on 1/4). Phosphorus 3.0 today from 2.6, 2.1 and 2.3 in past.  Alk phosphatase 118. PTH is 33 and  Vit D is 41    24 hour urine calcium shows <5mg/dl and urine volume is 2400ml for 24hours. FH shows low calcium in urine but she has mild renal disease with GFR 54ml - 60ml mostly and not on any thiazide diuretic that can make hypocalciuria. Since calcium is less than 5 without an absolute number, can't calculate fractional excretion of calcium.    Calcium Urine mg/dL <5.0    mg/dL Final 01/11/2020  8:45 PM 13   Calcium Urine g/24 h   0.10 - 0.30 g/24 h Final 01/11/2020  8:45 PM 13   Not Calculated    Calcium Urine g/g Cr    g/g Cr Final 01/11/2020  8:45 PM 13     Component Value Flag Ref Range Units Status Collected Lab   Creatinine Urine 21    mg/dL Final 01/11/2020  8:45 PM 13   Creatinine Urine Timed   0.80 - 1.80  Final 01/11/2020  8:45 PM 13   Not Calculated    Volume in mL 2400 ML    mL Final 01/11/2020  8:45 PM 13   Duration in hours 24 HOURS    h Final 01/11/2020  8:45 PM        Plan:    - Recommend to follow up as outpatient for definitive therapy for Osteoporosis. The best option could be once a year IV zoledronic acid therapy. The other option will be anabolic therapy with Forteo/teriparatide but it needs to be given on a daily  basis as an injection for two years.     - Regarding Romosozumab/Evenity - long term data on safety/side effects remain unknown and black box warning of cerebrovascular accident needs to be taken into account if choosing this drug.      - Discussed with Hospitalist service.     Discussed with staff attending - Dr.Caramori Guru Ramirez MD  PGY 4 - Endocrinology Fellow  Pager: [phone]  Call ** [phone] then enter job code [phone] for on call person.      ATTENDING NOTE  I have discussed the case with the endocrine fellow and agree with the plan of care.    Suha Aldridge MD PhD    Division of Endocrinology and Diabetes

## 2020-01-13 NOTE — PLAN OF CARE
Discharge Planner PT   Patient plan for discharge: TCU  Current status: WBAT. Patient ambulates within room and completes standing LE strengthening at FWW with SBA. Tolerance to activity improving but remains low.  Barriers to return to prior living situation: Medical needs, pain, weakness, level of assist for mobility, activity tolerance  Recommendations for discharge: TCU  Rationale for recommendations: To progress independence functional mobility, strength, balance, and safety       Entered by: Caity Moran 01/13/2020 9:30 AM

## 2020-01-13 NOTE — PLAN OF CARE
VS:   Stable; lungs CTA   Output:   Voiding yellow urine. PVR remains under 200 mL. Small BM overnight   Activity:   Up with walker and SBA   Skin: Scabs, abrasions, and blanchable redness noted.   Pain:   Patient complained of pain in her gluteal folds bilaterally. Tylenol, ice and position change offered some relief. Patient hesitant to take oxycodone   Neuro/CMS:   Intact; patient denied numbness and/or tingling   Dressing(s):   CDI; patient does complain of the dressing being itchy   Diet:   Regular   LDA:   None   Equipment:   Walker and gait belt at bedside   Plan:   Discharge pending further evaluation to determine medical stability; TCU vs rehab.   Additional Info:   Patient able to make needs known.

## 2020-01-13 NOTE — TELEPHONE ENCOUNTER
Left message to call back to schedule appointment with Dr. Jefferson in 2 months Ene Ferguson January 13, 2020 1:19 PM

## 2020-01-13 NOTE — PROGRESS NOTES
"Neurosurgery Progress Note     Visited with patient today. During visit, she was up walking around with walker around the room. She is working quite extensively with PT and OT. She states she feels stronger than before surgery, and has less pain. Since surgery, she initially had to be straight cath'd but this has not been required for several days. She is voiding on her own and emptying her bladder with low PVRs. She has a small amount of dribbling when she stands on occasion, but infrequently. She states that she can feel when she is moving stool and passing gas.     /72 (BP Location: Left arm)   Pulse 86   Temp 97.6  F (36.4  C) (Oral)   Resp 16   Ht 1.594 m (5' 2.75\")   Wt 57.5 kg (126 lb 11.2 oz)   SpO2 97%   Breastfeeding No   BMI 22.62 kg/m      Exam: Full strength in BLE, pin prick intact in legs, genital area, and perianal area.  Decreased soft touch in genital area, perianal area (baseline).  Rectal tone intact, able to bear down. Similar to previous exam.     A/P:   As discussed with Dr. Castillo, she again is adamant that she does not want any further surgery and is satisfied with utilizing diapers if needed in the future. She feels that her symptoms are improved from the last visit with us, and they are slightly better compared to before the surgery. Neurosurgery will continue peripherally following, and remain available if any new neurological change should occur.     Please, for questions or concerns, do not hesitate to call Neurosurgery on call pager.    * * * [phone] then job code [phone]    Yolanda Hall MD  Neurosurgery PGY5  "

## 2020-01-13 NOTE — PROGRESS NOTES
"[facility], [place]   Internal Medicine Daily Note           Interval History/Events     Overnight events reviewed  Reports doing well  No nausea, vomiting, chest pain, shortness of breath, lightheadedness or dizziness  No fever, chills  Tolerating diet  Pain controlled  Had small BM this AM  No burning urination         Review of Systems        4 point ROS including Respiratory, CV, GI and , other than that noted above is negative      Medications   I have reviewed current medications  in the \"current medication\" section of Epic.  Relevant changes include:     Physical Exam   General:       Vital signs:    Blood pressure 137/72, pulse 86, temperature 97.6  F (36.4  C), temperature source Oral, resp. rate 16, height 1.594 m (5' 2.75\"), weight 57.5 kg (126 lb 11.2 oz), SpO2 97 %, not currently breastfeeding.  Estimated body mass index is 22.62 kg/m  as calculated from the following:    Height as of this encounter: 1.594 m (5' 2.75\").    Weight as of this encounter: 57.5 kg (126 lb 11.2 oz).      Intake/Output Summary (Last 24 hours) at 1/7/2020 1233  Last data filed at 1/7/2020 0941  Gross per 24 hour   Intake 360 ml   Output 2370 ml   Net -2010 ml      Constitutional: Laying on bed in no acute distress  Eye: No icterus, no pallor  Mouth/ENT: Moist oral mucosa  Respiratory: B/l  CTA  Cardiovascular: S1, S2 normal. B/l pretibial edema  GI: Soft, NT, BS+  : No last's catheter  Neuro: Alert, awake, and conversing. No tremors  Psych: Normal mood  Integumentary: No rashes  MSK:  Heme/Lymph:     Laboratory and Imaging Studies     I have reviewed  laboratory and imaging studies in the Epic. Pertinent findings are as below:    BMP  Recent Labs   Lab 01/08/20  0638 01/07/20  0558 01/06/20  0811     --  136   POTASSIUM 4.2  --  4.1   CHLORIDE 107  --  104   SAMMIE 8.0* 7.2* 7.3*   CO2 25  --  21   BUN 14  --  15   CR 0.85  --  0.73   GLC 90 109* 89     CBC  Recent Labs   Lab 01/08/20  0638 " 01/06/20  0811   WBC 8.5 8.0   RBC 2.41* 2.36*   HGB 8.5* 8.2*   HCT 26.1* 25.5*   * 108*   MCH 35.3* 34.7*   MCHC 32.6 32.2   RDW 12.8 12.8    139*     INR  No lab results found in last 7 days.  LFTs  Recent Labs   Lab 01/09/20  0823   ALBUMIN 2.6*      PANCNo lab results found in last 7 days.        Impression/Plan          89 year old female admitted on 1/4/2020 s/p following ORIF of fracture pelvis and spinal instrumentation L4-S1, cement augmentation L4-l5    She has a known H/O Hypothyroidism and glaucoma.  Internal medicine consulted for post operative co management.  Individual problems and their management are outlined below     # s/p  ORIF of fracture pelvis and spinal instrumentation L4-S1, cement augmentation L4-L5 on 01/05/2020  EBL at 275 ml.  Management primarily per Ortho team.  - Wound cares, Dressings, Surgical pain management, DVT Prophylaxis  per primary team.   - Monitor anemia, hemodynamics, Input/Output, Capnography (for 24 hours)  - Encourage Incentive spirometry  - Laxatives for constipation prophylaxis     # Anemia: Most likely due to Hemodilution, blood loss, and post surgical inflammation.  Hg 8.5 gm/dl on 01/08/2020  Asymptomatic   - Transfuse if Hg < 7 gm/dl      # Hypothyroidism:   Continue PTA Levothyroxine 75 mcg/day    # Osteoporosis: Evaluated by Endocrinology. Recommend 24 hour urine calcium, and Creatinine. Patient going to consider therapy options  Urine collection for calcium done.   Discussed with Endocrine on 01/12  - Endocrine to follow today     # Glaucoma:   Ct PTA eye drops.     # Hypocalcemia: Albumin normal. Calcium 7.2 on 01/07.   Calcium carbonate increased to 500 mg TID from BID.   Calcium level 8 on 01/08  - Continue Calcium carbonate 500 mg TID    # Hypophosphatemia: level 2.3 on 01/08. Placed on protocol, and replaced  Phosphorus level 2.1 on 01/08 ,and normal level on 2.6 on 01/09  She was started on Neutraphos 1 pack TID  - Discontinue Neutraphos  as level is normal    # Thrombocytopenia: most likely d/t surgery  Level normal on 01/08  - monitor    # B/l leg swelling: Most likely multifactorial d/t low albumin, deconditioning. US lower extremity on 01/12 was negative for DVT  -CT stocking  - Low salt diet  - Elevate leg while sleeping       # Diet: Regular Diet Adult    # DVT Prophylaxis: Pneumatic Compression Devices  # Pino Catheter: None  # Code Status: Full Code         Pt's care was discussed with bedside RN, patient and  during Care Team Rounds.               Roger Gomez MD  Hospitalist ( Internal medicine)  Pager: [phone]

## 2020-01-13 NOTE — PROGRESS NOTES
Orthopaedic Surgery Progress Note 01/13/2020    E: No acute events overnight.    S: POD8. Working with PT/OT walking in room. Continued pain/soreness and swelling in lower extremities. Overall good mobilization. Denies new or worsening numbness or tingling. Denies cp/sob. Tolerating oral intake. +BM +flatus. PVRs have been <200 ml. US BLE yesterday negative for DVT.    O:  Temp: 98.1  F (36.7  C) Temp src: Oral BP: 132/72   Heart Rate: 75 Resp: 16 SpO2: 97 % O2 Device: None (Room air)      Exam:    Gen: NAD, resting comfortably in bed  Resp: Breathing comfortably on RA  Musculoskeletal: Dressing is c/d/i      Lower extremities:  Motor Strength Right Left   Hip flexion: L1, L2, L3 4/5 4/5   Hip adduction: L2, L3 5/5 5/5   Knee flexion: S1 5/5 5/5   Knee extension: L3, L4 5/5 5/5   Ankle dosiflexion: L4, L5 5/5 5/5   EHL: L5 4/5 5/5   Ankle plantarflexion: S1 5/5 5/5      Sensation from L1-S2 is preserved.       Recent Labs   Lab 01/08/20  0638 01/06/20  0811   WBC 8.5 8.0   HGB 8.5* 8.2*    139*       Assessment and Plan: Jyoti De Jesus is a 89 year old female now s/p spino-pelvic fixation on 1/5/2020 with Dr. Mason.      Goals Today:  - PT/OT as tolerated, continue to progress activity as tolerated  - Continue PVR checks 4 times per day   - Straight cath for PVR greater than 300 ml     Orthopedics Primary  Activity: No HOB restrictions.  Bed to chair only x3 days.  Then up with assistive device (walker).  No excessive bending or twisting. No lifting >10 lbs x 6 weeks. No Juan lift for transfers.   Weight bearing status: Protected WB BLE.  Diet: Diet as tolerated.   DVT prophylaxis: SCDs only. No chemical DVT ppx needed.  Labs: Labs PRN.  Bracing/Splinting: None.  Dressings: Keep Aquacel c/d/i x 7 days.  Drains: HV removed 1/8/2020.  Pino catheter: Removed per protocol.   Physical Therapy/Occupational Therapy: Eval and treat.  Cultures: None.  Consults: Hospitalist, Endocrinology, Urology, NSGY -  appreciate assistance in caring for patient.  Follow-up: Clinic with Dr. Mason in 6 weeks with repeat x-rays. Follow up with Endocrinology and Urology as an outpatient.  Disposition: Pending progress with therapies, pain control on orals, and medical stability, anticipate discharge to rehab likely early this week.       Future Appointments   Date Time Provider Department Center   1/9/2020  8:00 AM Marbin Oshea, PT [facility] [place]   1/9/2020  1:30 PM Caroline Hooks, OT [facility] [place]   1/9/2020  3:15 PM Marbin Oshea, PT [facility] [place]   2/20/2020 12:30 PM Wai [facility]   3/19/2020  2:45 PM Wai [facility] MD [facility]       Patient discussed with Dr. Isabella Tijerina MD, PhD  Orthopedic Surgery PGY4  Pager [phone]    Please page me directly with any questions/concerns during regular weekday hours before 5pm. If there is no response, if it is a weekend, or if it is during evening hours then please page the orthopaedic surgery resident on call.

## 2020-01-14 ENCOUNTER — APPOINTMENT (OUTPATIENT)
Dept: OCCUPATIONAL THERAPY | Facility: CLINIC | Age: 85
DRG: 516 | End: 2020-01-14
Payer: COMMERCIAL

## 2020-01-14 ENCOUNTER — APPOINTMENT (OUTPATIENT)
Dept: PHYSICAL THERAPY | Facility: CLINIC | Age: 85
DRG: 516 | End: 2020-01-14
Payer: COMMERCIAL

## 2020-01-14 VITALS
HEART RATE: 77 BPM | HEIGHT: 63 IN | TEMPERATURE: 97.8 F | SYSTOLIC BLOOD PRESSURE: 121 MMHG | DIASTOLIC BLOOD PRESSURE: 70 MMHG | RESPIRATION RATE: 16 BRPM | WEIGHT: 124.3 LBS | OXYGEN SATURATION: 97 % | BODY MASS INDEX: 22.02 KG/M2

## 2020-01-14 PROCEDURE — 25000132 ZZH RX MED GY IP 250 OP 250 PS 637: Performed by: ORTHOPAEDIC SURGERY

## 2020-01-14 PROCEDURE — 97110 THERAPEUTIC EXERCISES: CPT | Mod: GP

## 2020-01-14 PROCEDURE — 25000132 ZZH RX MED GY IP 250 OP 250 PS 637: Performed by: STUDENT IN AN ORGANIZED HEALTH CARE EDUCATION/TRAINING PROGRAM

## 2020-01-14 PROCEDURE — 99232 SBSQ HOSP IP/OBS MODERATE 35: CPT | Performed by: INTERNAL MEDICINE

## 2020-01-14 PROCEDURE — 97530 THERAPEUTIC ACTIVITIES: CPT | Mod: GP

## 2020-01-14 PROCEDURE — 97535 SELF CARE MNGMENT TRAINING: CPT | Mod: GO | Performed by: OCCUPATIONAL THERAPIST

## 2020-01-14 PROCEDURE — 99207 ZZC CDG-MDM COMPONENT: MEETS MODERATE - UP CODED: CPT | Performed by: INTERNAL MEDICINE

## 2020-01-14 PROCEDURE — 25000132 ZZH RX MED GY IP 250 OP 250 PS 637: Performed by: INTERNAL MEDICINE

## 2020-01-14 RX ORDER — OXYCODONE HYDROCHLORIDE 5 MG/1
5 TABLET ORAL EVERY 4 HOURS PRN
Qty: 60 TABLET | Refills: 0 | Status: SHIPPED | OUTPATIENT
Start: 2020-01-14

## 2020-01-14 RX ORDER — ACETAMINOPHEN 325 MG/1
650 TABLET ORAL EVERY 4 HOURS PRN
DISCHARGE
Start: 2020-01-14

## 2020-01-14 RX ADMIN — FAMOTIDINE 20 MG: 10 TABLET, COATED ORAL at 08:07

## 2020-01-14 RX ADMIN — DORZOLAMIDE HYDROCHLORIDE AND TIMOLOL MALEATE 2 DROP: 20; 5 SOLUTION/ DROPS OPHTHALMIC at 08:06

## 2020-01-14 RX ADMIN — BRIMONIDINE TARTRATE 2 DROP: 2 SOLUTION/ DROPS OPHTHALMIC at 08:05

## 2020-01-14 RX ADMIN — LEVOTHYROXINE SODIUM 75 MCG: 25 TABLET ORAL at 08:07

## 2020-01-14 RX ADMIN — CALCIUM 500 MG: 500 TABLET ORAL at 08:07

## 2020-01-14 RX ADMIN — Medication 5000 UNITS: at 08:07

## 2020-01-14 ASSESSMENT — ACTIVITIES OF DAILY LIVING (ADL)
ADLS_ACUITY_SCORE: 12

## 2020-01-14 ASSESSMENT — MIFFLIN-ST. JEOR: SCORE: 943.98

## 2020-01-14 NOTE — PLAN OF CARE
Occupational Therapy Discharge Summary    Reason for therapy discharge:    Discharged to transitional care facility.    Progress towards therapy goal(s). See goals on Care Plan in [facility] electronic health record for goal details.  Pt. Min/SBA with BADLs and functional mobility ambulating with FWW.      Therapy recommendation(s):    Continued therapy is recommended.  Rationale/Recommendations:  continue with OT in rehab setting to max. safety/indep. with ADLs/IADLs/mobility.

## 2020-01-14 NOTE — PROGRESS NOTES
"[facility], [place]   Internal Medicine Daily Note           Interval History/Events     Overnight events reviewed  Reports doing well  No nausea, vomiting, chest pain, shortness of breath, lightheadedness or dizziness  No fever, chills  Tolerating diet  Pain controlled         Review of Systems        4 point ROS including Respiratory, CV, GI and , other than that noted above is negative      Medications   I have reviewed current medications  in the \"current medication\" section of Epic.  Relevant changes include:     Physical Exam     B/P: 121/70, T: 97.8, P: 77, R: 16      General: alert, interactive, NAD  HEENT: AT/NC,Moist MM  Respi/Chest: Non labored  CVS/Heart: S1S2 regular  GI/Abd: Soft, non tender, non distended  MSK/Extremities: Distally warm, well perfused.     Neuro: AO x 4,   Psychiatry: Stable mood.       Laboratory and Imaging Studies     I have reviewed  laboratory and imaging studies in the Epic. Pertinent findings are as below:    BMP  Recent Labs   Lab 01/13/20  1052 01/08/20  0638    137   POTASSIUM 4.3 4.2   CHLORIDE 99 107   SAMMIE 8.4* 8.0*   CO2 27 25   BUN 17 14   CR 0.92 0.85   * 90     CBC  Recent Labs   Lab 01/08/20  0638   WBC 8.5   RBC 2.41*   HGB 8.5*   HCT 26.1*   *   MCH 35.3*   MCHC 32.6   RDW 12.8        INR  No lab results found in last 7 days.  LFTs  Recent Labs   Lab 01/09/20  0823   ALBUMIN 2.6*      PANCNo lab results found in last 7 days.        Impression/Plan          89 year old female admitted on 1/4/2020 s/p following ORIF of fracture pelvis and spinal instrumentation L4-S1, cement augmentation L4-l5    She has a known H/O Hypothyroidism and glaucoma.  Internal medicine consulted for post operative co management.  Individual problems and their management are outlined below     # s/p  ORIF of fracture pelvis and spinal instrumentation L4-S1, cement augmentation L4-L5 on 01/05/2020  EBL at 275 ml.  Management primarily " per Ortho team.  - Wound cares, Dressings, Surgical pain management, DVT Prophylaxis  per primary team.   - Monitor anemia, hemodynamics, Input/Output, Capnography (for 24 hours)  - Encourage Incentive spirometry  - Laxatives for constipation prophylaxis     # Anemia: Most likely due to Hemodilution, blood loss, and post surgical inflammation.  Hg 8.5 gm/dl on 01/08/2020  Asymptomatic   - Transfuse if Hg < 7 gm/dl      # Hypothyroidism:   Continue PTA Levothyroxine 75 mcg/day    # Osteoporosis: Evaluated by Endocrinology. Recommend 24 hour urine calcium, and Creatinine. Patient going to consider therapy options  Urine collection for calcium done.   Discussed with Endocrine on 01/12  - Endocrine to follow today     # Glaucoma:   Ct PTA eye drops.     # Hypocalcemia: Albumin normal. Calcium 7.2 on 01/07.   Calcium carbonate increased to 500 mg TID from BID.   Calcium level 8 on 01/08  - Continue Calcium carbonate 500 mg TID    # Hypophosphatemia: level 2.3 on 01/08. Placed on protocol, and replaced  Phosphorus level 2.1 on 01/08 ,and normal level on 2.6 on 01/09  She was started on Neutraphos 1 pack TID  - Discontinue Neutraphos as level is normal    # Thrombocytopenia: most likely d/t surgery  Level normal on 01/08  - monitor    # B/l leg swelling: Most likely multifactorial d/t low albumin, deconditioning. US lower extremity on 01/12 was negative for DVT  -CT stocking  - Low salt diet  - Elevate leg while sleeping       # Diet: Regular Diet Adult    # DVT Prophylaxis: Pneumatic Compression Devices  # Pino Catheter: None  # Code Status: Full Code         Pt's care was discussed with bedside RN, patient and  during Care Team Rounds.       REST PER PRIMARY TEAM.       Og Reid MD   Hospitalist (Internal Medicine)  Pager: [phone].

## 2020-01-14 NOTE — PLAN OF CARE
Pt. discharged at 1430 via automobile to [facility] in [place].  Pt. was accompanied by daughter, Bambi and patients , and left with personal belongings.  Prior to discharge, PIV was removed.  Pt. received complete discharge paperwork and paperwork was faxed to [facility] as well.  Report was called to Litzy at [facility] at 1445.  Pt. had no further questions at the time of discharge and no unmet needs were identified.

## 2020-01-14 NOTE — PLAN OF CARE
Discharge Planner PT   Patient plan for discharge: TCU  Current status: Patient discharge delayed 2/2 insurance authorization difficulties. Patient ambulated ~4x20' within the room with FWW and SBA, showing good stability and tolerance. Patient tolerated standing TE well, mild fatigue at end.  Barriers to return to prior living situation: Medical, decreased strength and stability  Recommendations for discharge: TCU  Rationale for recommendations: Patient would benefit from stay in a TCU with continued skilled PT services for strength, balance and activity tolerance.    Physical Therapy Discharge Summary    Reason for therapy discharge:    Discharged to transitional care facility.    Progress towards therapy goal(s). See goals on Care Plan in [facility] electronic health record for goal details.  Goals met    Therapy recommendation(s):    Continued therapy is recommended.  Rationale/Recommendations:  strength, balance, activity tolerance.           Entered by: Gayle Tam 01/14/2020 11:12 AM

## 2020-01-14 NOTE — PROGRESS NOTES
Orthopaedic Surgery Progress Note 01/14/2020    E: Episode of diarrhea last evening per patient.    S: POD9. Working with PT/OT and continues to be able to ambulate in room. Pain controlled. Denies new or worsening numbness or tingling. Denies cp/sob. Tolerating oral intake. +BM +flatus. Questions answered.    O:  Temp: 97.6  F (36.4  C) Temp src: Oral BP: 137/72   Heart Rate: 72 Resp: 16 SpO2: 97 % O2 Device: None (Room air)      Exam:    Gen: NAD, resting comfortably in bed  Resp: Breathing comfortably on RA  Musculoskeletal: Dressing is c/d/i      Lower extremities:  Motor Strength Right Left   Hip flexion: L1, L2, L3 4/5 4/5   Hip adduction: L2, L3 5/5 5/5   Knee flexion: S1 5/5 5/5   Knee extension: L3, L4 5/5 5/5   Ankle dosiflexion: L4, L5 5/5 5/5   EHL: L5 5/5 5/5   Ankle plantarflexion: S1 5/5 5/5      Sensation from L1-S2 is preserved.       Recent Labs   Lab 01/08/20  0638   WBC 8.5   HGB 8.5*          Assessment and Plan: Jyoti De Jesus is a 89 year old female now s/p spino-pelvic fixation on 1/5/2020 with Dr. Mason.      Goals Today:  - PT/OT as tolerated  - Straight cath for PVR greater than 300 ml  - TCU when available     Orthopedics Primary  Activity: No HOB restrictions.  Bed to chair only x3 days.  Then up with assistive device (walker).  No excessive bending or twisting. No lifting >10 lbs x 6 weeks. No Juan lift for transfers.   Weight bearing status: Protected WB BLE.  Diet: Diet as tolerated.   DVT prophylaxis: SCDs only. No chemical DVT ppx needed.  Labs: Labs PRN.  Bracing/Splinting: None.  Dressings: Keep Aquacel c/d/i x 7 days.  Drains: HV removed 1/8/2020.  Pnio catheter: Removed per protocol.   Physical Therapy/Occupational Therapy: Eval and treat.  Cultures: None.  Consults: Hospitalist, Endocrinology, Urology, NSGY - appreciate assistance in caring for patient.  Follow-up: Clinic with Dr. Isabella in 6 weeks with repeat x-rays. Follow up with Endocrinology and Urology as  an outpatient.  Disposition: Pending progress with therapies, pain control on orals, and medical stability, anticipate discharge to TCU when available.       Future Appointments   Date Time Provider Department Center   1/9/2020  8:00 AM Marbin Oshea, PT [facility] [place]   1/9/2020  1:30 PM Caroline Hooks, OT [facility] [place]   1/9/2020  3:15 PM Marbin Oshea, SREE [facility] [place]   2/20/2020 12:30 PM Wai Masno MD [facility]   3/19/2020  2:45 PM Wai Mason MD [facility]       Patient discussed with Dr. Mason      ---  [x] Drains removed  [x] Postop XRs reviewed  [x] Pain scripts signed  [x] Discharge orders placed  [x] Discharge summary started  [x] Follow up requested    Ousmane Tijerina MD, PhD  Orthopedic Surgery PGY4  Pager [phone]    Please page me directly with any questions/concerns during regular weekday hours before 5pm. If there is no response, if it is a weekend, or if it is during evening hours then please page the orthopaedic surgery resident on call.

## 2020-01-14 NOTE — PROGRESS NOTES
Social Work Services Discharge Note      Patient Name:  Jyoti De Jesus     Anticipated Discharge Date:  1/14/2020    Discharge Disposition:   [facility]U:  [facility]    Following MD:  Yuliet Alcantar APRN CNS      Pre-Admission Screening (PAS) online form has been completed.  The Level of Care (LOC) is:  Determined  Confirmation Code is:  [id number]     Additional Services/Equipment Arranged:  Family transporting with assistance from nurse     Patient / Family response to discharge plan:  Agreeable     Persons notified of above discharge plan:  Pt, family, charge RN, bedside RN, [facility]    Staff Discharge Instructions:  Please fax discharge orders and signed hard scripts for any controlled substances.  Please print a packet and send with patient.     CTS Handoff completed:  YES    Medicare Notice of Rights provided to the patient/family:  YES    YUDI Corral  5A/10A Ortho/Med/Surg [facility]  Phone: [phone] Pager: [phone]

## 2020-01-14 NOTE — PLAN OF CARE
"BP 91/56 (BP Location: Left arm)   Pulse 79   Temp 97.9  F (36.6  C) (Oral)   Resp 16   Ht 1.594 m (5' 2.75\")   Wt 57.5 kg (126 lb 11.2 oz)   SpO2 97%   Breastfeeding No   BMI 22.62 kg/m     Alert,well orientated.Back drsg,CDI.Very minimal pain,reporting effectivity of oxycodone given at 11pm.Voiding better with very low PVRs.Urge urine incontinence on pad then voided more in BR.Is passing gas,continent of BM,medium size.No numbness/tingling sensation.Plan discharge to TCU [facility] [place].Sleeping mostly through the night.Using call light well.TEDS/PCDs on.  "

## 2020-01-15 NOTE — TELEPHONE ENCOUNTER
[facility] Call Center    Phone Message    May a detailed message be left on voicemail: yes    Reason for Call: Other: Pt's  called back and stated pt does not schedule an appt. Thanks.     Action Taken: Message routed to:  [facility] ([facility]

## 2020-01-15 NOTE — TELEPHONE ENCOUNTER
Patient's  Abdelrahman states that the patient is in a transitional care center. He states that he does not know when she would be released. He states that they will call back if the patient is still in need for this appointment.      Senait Adrian MA

## 2020-01-16 ENCOUNTER — TELEPHONE (OUTPATIENT)
Dept: ORTHOPEDICS | Facility: CLINIC | Age: 85
End: 2020-01-16

## 2020-01-16 NOTE — TELEPHONE ENCOUNTER
"[facility] Call Center    Phone Message    May a detailed message be left on voicemail: yes    Reason for Call: Farideh calling from [facility] in [place] stating that patient is \"up and ambulating short distances x2 days now.\" Caller requesting an end date for patient's TEDS from Dr. Mason.   Caller stating a fax with end date can be faxed to: [phone] in attention to: Nurses on 2nd floor.     Farideh, care coordinator, phone: [phone]    Please advise. Thank you, Mora Reynolds on 1/16/2020 at 2:28 PM       Action Taken: Message routed to:  [facility]  "

## 2020-01-17 NOTE — TELEPHONE ENCOUNTER
Writer called Farideh back from the TCU. Writer informed her that Dr. Mason's PA stated that pt has to up and active st least 5-10 daily and close to do their normal activity before stopping the Jerald stockings. Farideh agreed with the recommendation and stated that they will probably keep pt in the stocking until discharge from TCU as pt will be up an independent by then.     Lynn Hammer LPN

## 2020-01-20 ENCOUNTER — TELEPHONE (OUTPATIENT)
Dept: ENDOCRINOLOGY | Facility: CLINIC | Age: 85
End: 2020-01-20

## 2020-01-20 NOTE — TELEPHONE ENCOUNTER
"Patient ok to schedule 1st available JEAN CARLSO with Luis [facility] Johnson Malaeb, or [facility], as they are our bone density specialists. Per referral notes: \"Osteoporosis to see DR.Anne Salgado who saw pt in Hospital after Spine Surgery 1-5-2020 Pelvic Fixation & Lumbar Instrumentation for Sacral & Lumbar Fractures.\"    Please advise patient that if she wishes to see Dr Salgado, she'll have to schedule for 1st available which is in July 2020.    "

## 2020-01-22 ENCOUNTER — TELEPHONE (OUTPATIENT)
Dept: ENDOCRINOLOGY | Facility: CLINIC | Age: 85
End: 2020-01-22

## 2020-01-22 NOTE — TELEPHONE ENCOUNTER
Spoke to patient's daughter Bambi who stated that the patient is set up to see her PCP on 1/27 and would like to discuss with her/him about the treatment that was recommended by our inpatient endo team before schedule appointment with us. I gave Bambi my direct call back number to call back and schedule 1st available JEAN CARLOS with either Oralia Salgado Trost, Chow or Lyubov within 2-4 weeks from discharge.

## 2020-01-22 NOTE — TELEPHONE ENCOUNTER
----- Message from Karen Salgado MD sent at 1/17/2020  4:12 PM CST -----  Regarding: RE: Appointment  This patient can see any endo if appointments are available; if not I will add her to my schedule.  Karen   ----- Message -----  From: Mary Mora  Sent: 1/16/2020   5:33 PM CST  To: Karen Salgado MD, #  Subject: RE: Appointment                                  Hi Dr Salgado,    Please advise where you'd like to see this patient in the recommended timeframe below.    Mary Brennan  ----- Message -----  From: Adam Guzmán MD  Sent: 1/15/2020  12:23 PM CST  To: Karen Salgado MD, #  Subject: Appointment                                      GISELA Manuel, This patient is discharged from hospital. Can you make an appointment with Dr.Anne Salgado in 2-4 weeks for Osteoporosis management.    ThanksGuru

## 2020-02-14 DIAGNOSIS — Z98.890 S/P SPINAL SURGERY: Primary | ICD-10-CM

## 2020-02-20 ENCOUNTER — ANCILLARY PROCEDURE (OUTPATIENT)
Dept: GENERAL RADIOLOGY | Facility: CLINIC | Age: 85
End: 2020-02-20
Attending: ORTHOPAEDIC SURGERY
Payer: COMMERCIAL

## 2020-02-20 ENCOUNTER — OFFICE VISIT (OUTPATIENT)
Dept: ORTHOPEDICS | Facility: CLINIC | Age: 85
End: 2020-02-20
Payer: COMMERCIAL

## 2020-02-20 VITALS — HEIGHT: 62 IN | BODY MASS INDEX: 22.26 KG/M2 | WEIGHT: 121 LBS

## 2020-02-20 DIAGNOSIS — Z98.890 S/P SPINAL SURGERY: Primary | ICD-10-CM

## 2020-02-20 DIAGNOSIS — M84.48XD SACRAL INSUFFICIENCY FRACTURE WITH ROUTINE HEALING, SUBSEQUENT ENCOUNTER: Primary | ICD-10-CM

## 2020-02-20 ASSESSMENT — MIFFLIN-ST. JEOR: SCORE: 910.97

## 2020-02-20 NOTE — NURSING NOTE
"Reason For Visit:   Chief Complaint   Patient presents with     RECHECK     DOS 01/05/20 Pelvic fixation and lumbar instumentation for sacral & Lumbar fractures        Primary MD: Jet Matt  Ref. MD: Dr. Matt    ?  No  Currently working? No.  Work status?  Retired.    Date of injury: 12/2019  Type of injury: Fell in the garage.    Date of surgery: 1/5/2020  Type of surgery: Pelvic fixation.   Segmental spinal instrumentation L4 to S1.   Cement augmentation L4 and L5.    Image-guided surgery.      Smoker: No  Request smoking cessation information: No    Ht 1.565 m (5' 1.61\")   Wt 54.9 kg (121 lb)   BMI 22.41 kg/m      Pain Assessment  Patient Currently in Pain: Yes  0-10 Pain Scale: 0  Primary Pain Location: Back  Alleviating Factors: Pain medication  Aggravating Factors: Lying    Oswestry (TRANG) Questionnaire    OSWESTRY DISABILITY INDEX 2/20/2020   Count 9   Sum 5   Oswestry Score (%) 11.11          Visual Analog Pain Scale  Back Pain Scale 0-10: 0  Right leg pain: 0  Left leg pain: 0  Neck Pain Scale 0-10: 0  Right arm pain: 0  Left arm pain: 0    Promis 10 Assessment    PROMIS 10 2/20/2020   In general, would you say your health is: Very good   In general, would you say your quality of life is: Very good   In general, how would you rate your physical health? Very good   In general, how would you rate your mental health, including your mood and your ability to think? Very good   In general, how would you rate your satisfaction with your social activities and relationships? Very good   In general, please rate how well you carry out your usual social activities and roles Good   To what extent are you able to carry out your everyday physical activities such as walking, climbing stairs, carrying groceries, or moving a chair? A little   How often have you been bothered by emotional problems such as feeling anxious, depressed or irritable? Rarely   How would you rate your fatigue on average? " None   How would you rate your pain on average?   0 = No Pain  to  10 = Worst Imaginable Pain 0   In general, would you say your health is: 4   In general, would you say your quality of life is: 4   In general, how would you rate your physical health? 4   In general, how would you rate your mental health, including your mood and your ability to think? 4   In general, how would you rate your satisfaction with your social activities and relationships? 4   In general, please rate how well you carry out your usual social activities and roles. (This includes activities at home, at work and in your community, and responsibilities as a parent, child, spouse, employee, friend, etc.) 3   To what extent are you able to carry out your everyday physical activities such as walking, climbing stairs, carrying groceries, or moving a chair? 2   In the past 7 days, how often have you been bothered by emotional problems such as feeling anxious, depressed, or irritable? 2   In the past 7 days, how would you rate your fatigue on average? 1   In the past 7 days, how would you rate your pain on average, where 0 means no pain, and 10 means worst imaginable pain? 0   Global Mental Health Score 16   Global Physical Health Score 16   PROMIS TOTAL - SUBSCORES 32                Lynn Hammer LPN

## 2020-02-20 NOTE — LETTER
2020       RE: Jyoti De Jesus  [address]  [facility] [address]     Dear Colleague,    Thank you for referring your patient, Jyoti De Jesus, to the [facility] at [facility]. Please see a copy of my visit note below.    [facility]  Orthopedics  Wai Mason MD  2020     Name: Jyoti De Jesus  MRN: [id number]  Age: 89 year old  : 1928  Referring provider: Referred Self     Chief Complaint: RECHECK (DOS 20 Pelvic fixation and lumbar instumentation for sacral & Lumbar fractures )      Date of Surgery: 2020    Procedure: Pelvic fixation with spinal instrumentation L4-S1, cement augmentation L4 and L5 for sacral insufficiency fracture and L5 transverse process fractures    History of Present Illness:   Jyoti De Jesus is a 89 year old female 6 weeks status post the above procedure here for routine follow-up.  Patient presents today with her  and daughter.  Overall she states that she is doing extremely well, noting that she has continued to advance her activities, currently walking with a walker and having minimal pain.  She is not requiring any medicines for pain management.  She notes that she is had improvement in both her bowel as well as bladder function, still does occasionally wear diaper for precaution, but has not had any significant episodes of incontinence or loss of control.  She notes that she has not had any radiating pain or numbness or tingling.  She does endorse some discomfort in bilateral heels, mostly at night, noting that she is been laying on her back for sleep since her surgery, stating that she is avoided laying on her sides given concerns for damaging her fixation.  She denies any issues with fevers, chills, chest pain or difficulty breathing.  She is transition to home at this point, and states that she is overall doing very well.  She and her family are extremely  "satisfied with results of her surgery, and expresses sincere gratitude for her treatment and significant improvement postoperatively.    Physical Examination:  Ht 1.565 m (5' 1.61\")   Wt 54.9 kg (121 lb)   BMI 22.41 kg/m     General: Well-developed, well-nourished, very pleasant, no acute distress, answer questions properly  Respiratory: Nonlabored breathing on room air  Musculoskeletal:    Patient demonstrates nonantalgic gait in room without walker for short distances in room, stands with normal sagittal alignment.  Strength and sensation preserved bilaterally and symmetrically.  Extremities warm well perfused.  Well-healed posterior midline incision, right hip incision, and left incision well-healing with small erythema at site of retained Monocryl suture without concerns for infection.    Radiographs:   Radiographs of the pelvis/lumbar spine 3 views (02/20/2020):  3 views of the left pelvis reviewed demonstrates lumbosacral pelvic fixation in place, SI transiliac transsacral screws in place without interval displacement, no concerns for instrumentation failure.    I have independently reviewed the above imaging studies; the results were discussed with the patient.     Assessment:   89 year old female 6 weeks status post pelvic fixation with spinal instrumentation L4-S1, cement augmentation L4 and L5 for sacral insufficiency fracture and L5 transverse process fractures, and question of cauda equina syndrome, overall doing extremely well with remarkable postoperative recovery.  At this point she is healed her incisions well, and x-rays demonstrate no concerns for interval instrumentation change.  She is continued to make progress with regards to her mobility.  Pain at this point is well controlled without need for narcotic medication.      Plan:   1.  We will initiate physical therapy with goals of advancing gait and mobilization from walker to cane to independent ambulation as tolerated  2.  Discussed that at " this point is okay for her to sleep in any position that is comfortable for her, and specifically regarding her heel pain, question of whether this is related to her resting in a supine position over the last 6 weeks.  Discussed that should her persistent bilateral heel pain persist, would then recommend evaluation with our pain clinic for further evaluation and potential treatment.  3.  Follow-up in 4 weeks with repeat x-rays    Patient and her family expressed a good understanding and agreement with our plan.  All questions answered.    Patient seen discussed with Dr. Mason who agrees with the above assessment and plan    Juan A Mi MD  Orthopedic Surgery PGY-4  I saw and evaluated the patient and developed the plan.  Wai Mason MD      Answers for HPI/ROS submitted by the patient on 2/20/2020   General Symptoms: No  Skin Symptoms: No  HENT Symptoms: No  EYE SYMPTOMS: No  HEART SYMPTOMS: No  LUNG SYMPTOMS: No  INTESTINAL SYMPTOMS: No  URINARY SYMPTOMS: No  GYNECOLOGIC SYMPTOMS: No  BREAST SYMPTOMS: No  SKELETAL SYMPTOMS: No  BLOOD SYMPTOMS: No  NERVOUS SYSTEM SYMPTOMS: No  MENTAL HEALTH SYMPTOMS: No      Again, thank you for allowing me to participate in the care of your patient.      Sincerely,    Wai Mason MD

## 2020-02-20 NOTE — PROGRESS NOTES
"[facility]  Orthopedics  Wai Mason MD  2020     Name: Jyoti De Jesus  MRN: [id number]  Age: 89 year old  : 1928  Referring provider: Referred Self     Chief Complaint: RECHECK (DOS 20 Pelvic fixation and lumbar instumentation for sacral & Lumbar fractures )      Date of Surgery: 2020    Procedure: Pelvic fixation with spinal instrumentation L4-S1, cement augmentation L4 and L5 for sacral insufficiency fracture and L5 transverse process fractures    History of Present Illness:   Jyoti De Jesus is a 89 year old female 6 weeks status post the above procedure here for routine follow-up.  Patient presents today with her  and daughter.  Overall she states that she is doing extremely well, noting that she has continued to advance her activities, currently walking with a walker and having minimal pain.  She is not requiring any medicines for pain management.  She notes that she is had improvement in both her bowel as well as bladder function, still does occasionally wear diaper for precaution, but has not had any significant episodes of incontinence or loss of control.  She notes that she has not had any radiating pain or numbness or tingling.  She does endorse some discomfort in bilateral heels, mostly at night, noting that she is been laying on her back for sleep since her surgery, stating that she is avoided laying on her sides given concerns for damaging her fixation.  She denies any issues with fevers, chills, chest pain or difficulty breathing.  She is transition to home at this point, and states that she is overall doing very well.  She and her family are extremely satisfied with results of her surgery, and expresses sincere gratitude for her treatment and significant improvement postoperatively.    Physical Examination:  Ht 1.565 m (5' 1.61\")   Wt 54.9 kg (121 lb)   BMI 22.41 kg/m    General: Well-developed, well-nourished, very pleasant, no acute distress, " answer questions properly  Respiratory: Nonlabored breathing on room air  Musculoskeletal:    Patient demonstrates nonantalgic gait in room without walker for short distances in room, stands with normal sagittal alignment.  Strength and sensation preserved bilaterally and symmetrically.  Extremities warm well perfused.  Well-healed posterior midline incision, right hip incision, and left incision well-healing with small erythema at site of retained Monocryl suture without concerns for infection.    Radiographs:   Radiographs of the pelvis/lumbar spine 3 views (02/20/2020):  3 views of the left pelvis reviewed demonstrates lumbosacral pelvic fixation in place, SI transiliac transsacral screws in place without interval displacement, no concerns for instrumentation failure.    I have independently reviewed the above imaging studies; the results were discussed with the patient.     Assessment:   89 year old female 6 weeks status post pelvic fixation with spinal instrumentation L4-S1, cement augmentation L4 and L5 for sacral insufficiency fracture and L5 transverse process fractures, and question of cauda equina syndrome, overall doing extremely well with remarkable postoperative recovery.  At this point she is healed her incisions well, and x-rays demonstrate no concerns for interval instrumentation change.  She is continued to make progress with regards to her mobility.  Pain at this point is well controlled without need for narcotic medication.      Plan:   1.  We will initiate physical therapy with goals of advancing gait and mobilization from walker to cane to independent ambulation as tolerated  2.  Discussed that at this point is okay for her to sleep in any position that is comfortable for her, and specifically regarding her heel pain, question of whether this is related to her resting in a supine position over the last 6 weeks.  Discussed that should her persistent bilateral heel pain persist, would then  recommend evaluation with our pain clinic for further evaluation and potential treatment.  3.  Follow-up in 4 weeks with repeat x-rays    Patient and her family expressed a good understanding and agreement with our plan.  All questions answered.    Patient seen discussed with Dr. Mason who agrees with the above assessment and plan    Juan A Mi MD  Orthopedic Surgery PGY-4  I saw and evaluated the patient and developed the plan.  Wai Mason MD      Answers for HPI/ROS submitted by the patient on 2/20/2020   General Symptoms: No  Skin Symptoms: No  HENT Symptoms: No  EYE SYMPTOMS: No  HEART SYMPTOMS: No  LUNG SYMPTOMS: No  INTESTINAL SYMPTOMS: No  URINARY SYMPTOMS: No  GYNECOLOGIC SYMPTOMS: No  BREAST SYMPTOMS: No  SKELETAL SYMPTOMS: No  BLOOD SYMPTOMS: No  NERVOUS SYSTEM SYMPTOMS: No  MENTAL HEALTH SYMPTOMS: No

## 2020-02-21 ENCOUNTER — TELEPHONE (OUTPATIENT)
Dept: ORTHOPEDICS | Facility: CLINIC | Age: 85
End: 2020-02-21

## 2020-02-21 DIAGNOSIS — M84.48XD SACRAL INSUFFICIENCY FRACTURE WITH ROUTINE HEALING, SUBSEQUENT ENCOUNTER: Primary | ICD-10-CM

## 2020-02-21 DIAGNOSIS — S32.000A LUMBAR COMPRESSION FRACTURE (H): ICD-10-CM

## 2020-02-21 DIAGNOSIS — Z98.890 S/P SPINAL SURGERY: ICD-10-CM

## 2020-02-21 NOTE — TELEPHONE ENCOUNTER
[facility] Call Center    Phone Message    May a detailed message be left on voicemail: yes     Reason for Call: Other: Pt daughter would like a call back to discuss other PT options. PLease contact Bambi      Action Taken: Message routed to:  [facility]    Travel Screening: Not Applicable

## 2020-02-21 NOTE — TELEPHONE ENCOUNTER
I called livan back. She wants In home PT if possible. It is hard for them to get out and to physical therapy places.       Forwarding to RN to Check with provider and see if pt can receive home PT    Caitlin

## 2020-02-24 NOTE — TELEPHONE ENCOUNTER
See phone request for [facility] PT order.  Order placed.  I called daughter back & she will call to schedule.  Call back prn.    S.O./Rachel Richardson RN.

## 2020-02-26 NOTE — TELEPHONE ENCOUNTER
" [facility] Call Center    Phone Message    May a detailed message be left on voicemail: yes     Reason for Call: Patient's daughter calling requesting to speak with Rachel CHRISTENSEN regarding PT orders. Jyoti requesting that PT orders state \"in home PT/ home bound PT\"  Home bound pt required per insurance purposes.     Please advise.  Thank you, Mora Reynolds on 2/26/2020 at 4:47 PM     Action Taken: Message routed to:  [facility]    Travel Screening: Not Applicable                                                                      "

## 2020-02-26 NOTE — TELEPHONE ENCOUNTER
Called livan back. She was just wanting to clarify that the order stated that the patient was homebound because that's what her insurance stated should be on there for them to cover the homecare PT. I found that it stated the patient was homebound and she was very happy and will follow up with her insurance and her PT

## 2020-02-28 ENCOUNTER — MEDICAL CORRESPONDENCE (OUTPATIENT)
Dept: HEALTH INFORMATION MANAGEMENT | Facility: CLINIC | Age: 85
End: 2020-02-28

## 2020-02-28 ENCOUNTER — TELEPHONE (OUTPATIENT)
Dept: ORTHOPEDICS | Facility: CLINIC | Age: 85
End: 2020-02-28

## 2020-02-28 NOTE — TELEPHONE ENCOUNTER
[facility] Call Center    Phone Message    May a detailed message be left on voicemail: yes     Reason for Call: Order(s): Home Care Orders: Physical Therapy (PT): twice/week for two weeks = 4 visits for gait balance   Also requesting OT evaluation for bladder control exercises.    Please contact [facility], with verbal orders. OK to leave VM on confidential VM: [phone]    Thank you, Mora Reynolds on 2/28/2020 at 3:21 PM     Action Taken: Message routed to:  [facility]    Travel Screening: NOT APPLICABLE

## 2020-03-04 ENCOUNTER — MEDICAL CORRESPONDENCE (OUTPATIENT)
Dept: HEALTH INFORMATION MANAGEMENT | Facility: CLINIC | Age: 85
End: 2020-03-04

## 2020-03-05 ENCOUNTER — TELEPHONE (OUTPATIENT)
Dept: ORTHOPEDICS | Facility: CLINIC | Age: 85
End: 2020-03-05

## 2020-03-05 NOTE — TELEPHONE ENCOUNTER
[facility] Call Center    Phone Message    May a detailed message be left on voicemail: yes     Reason for Call: Order(s): Home Care Orders: Physical Therapy (PT): ongoing therapy 2x a weeek for 3 weeks addressing bathing safety and kitchen mobility, She needs verbal orders as well as a questions regarding any restriction     Action Taken: Message routed to:  [facility]o    Travel Screening: Not Applicable

## 2020-03-06 NOTE — TELEPHONE ENCOUNTER
See phone message from home PT.  I called  Her back & left message OK for whatever home orders she needs & for restrictions she is 89yo with Lumbar Fracture so No lifting >10# & avoid bending & twisting motions & focus on strengthening to prevent falls.  Call back prn. S.O./Rachel Richardson RN.

## 2020-03-12 ENCOUNTER — TELEPHONE (OUTPATIENT)
Dept: ORTHOPEDICS | Facility: CLINIC | Age: 85
End: 2020-03-12

## 2020-03-12 NOTE — TELEPHONE ENCOUNTER
Writer faxed over Home care orders that Dr. Mason signed. It was faxed to [facility] [phone]    Lynn Hammer LPN

## 2020-03-13 ENCOUNTER — MEDICAL CORRESPONDENCE (OUTPATIENT)
Dept: HEALTH INFORMATION MANAGEMENT | Facility: CLINIC | Age: 85
End: 2020-03-13

## 2020-03-13 ENCOUNTER — TELEPHONE (OUTPATIENT)
Dept: ORTHOPEDICS | Facility: CLINIC | Age: 85
End: 2020-03-13

## 2020-03-13 DIAGNOSIS — Z98.890 S/P SPINAL SURGERY: Primary | ICD-10-CM

## 2020-03-13 NOTE — TELEPHONE ENCOUNTER
Called patient's daughter back and let her know that Dr. Mason is fine with the patient getting her imaging done at a different location and having him review it and call with results and a plan going forward. Orders were printed and sent to [facility] per patient's request and they will skip coming to the appointment in person with Dr. Mason.

## 2020-03-13 NOTE — TELEPHONE ENCOUNTER
[facility] Call Center    Phone Message    May a detailed message be left on voicemail: yes     Reason for Call: Other: Pt daughter would like to take her mom else where for the images that has less people and would like a call back to discuss.     Action Taken: Message routed to:  [facility]    Travel Screening: Not Applicable

## 2020-03-18 ENCOUNTER — MEDICAL CORRESPONDENCE (OUTPATIENT)
Dept: HEALTH INFORMATION MANAGEMENT | Facility: CLINIC | Age: 85
End: 2020-03-18

## 2020-03-23 ENCOUNTER — TRANSFERRED RECORDS (OUTPATIENT)
Dept: HEALTH INFORMATION MANAGEMENT | Facility: CLINIC | Age: 85
End: 2020-03-23

## 2020-04-01 ENCOUNTER — TELEPHONE (OUTPATIENT)
Dept: ORTHOPEDICS | Facility: CLINIC | Age: 85
End: 2020-04-01

## 2020-04-01 NOTE — TELEPHONE ENCOUNTER
See phone message & triage response message to daughter.  89yo pt.   XR result from [facility] 3-23-20 is here in our system.  I tried to call daughter back several times & left her a message that XR is here & to call us back [phone] option 1 & make a phone call visit with  ( not set up for video appt, yet) to review XR & plan.     has availability for phone appt. Tomorrow 4-2-20 at 0945 or next week.  Call back prn.  Rachel Richardson RN.

## 2020-04-01 NOTE — TELEPHONE ENCOUNTER
[facility] Call Center    Phone Message    May a detailed message be left on voicemail: yes     Reason for Call: Requesting Results   Name/type of test: X-Ray  Date of test: 03/23/20   Was test done at a [facility]  Yes: [facility]    Pt Daughter Bambi requests call back to go over X-Ray results and to ask questions about her moms recovery and restrictions - wondering if restrictions still in place for ending over etc - Pt of Dr Mason - Pt Daughter wants to talk to Dr Mason or his nurse - please return her call - Thanks         Action Taken: Message routed to:  [facility]o    Travel Screening: Not Applicable

## 2020-04-01 NOTE — TELEPHONE ENCOUNTER
Writer called pt's Daughter back and notified her that Dr. Mason is not in until tomorrow. Writer will pass it on to his team to have them review the xray tomorrow and to get back to them via the phone to discuss.     Lynn Hammer LPN

## 2020-04-02 ENCOUNTER — MEDICAL CORRESPONDENCE (OUTPATIENT)
Dept: HEALTH INFORMATION MANAGEMENT | Facility: CLINIC | Age: 85
End: 2020-04-02

## 2020-04-09 ENCOUNTER — VIRTUAL VISIT (OUTPATIENT)
Dept: ORTHOPEDICS | Facility: CLINIC | Age: 85
End: 2020-04-09
Payer: COMMERCIAL

## 2020-04-09 DIAGNOSIS — M84.48XD SACRAL INSUFFICIENCY FRACTURE WITH ROUTINE HEALING, SUBSEQUENT ENCOUNTER: Primary | ICD-10-CM

## 2020-04-09 NOTE — PROGRESS NOTES
"Subjective     Jyoti De Jesus is a 89 year old female who is being evaluated via a billable telephone visit.      The patient has been notified of following:     \"This telephone visit will be conducted via a call between you and your physician/provider. We have found that certain health care needs can be provided without the need for a physical exam.  This service lets us provide the care you need with a short phone conversation.  If a prescription is necessary we can send it directly to your pharmacy.  If lab work is needed we can place an order for that and you can then stop by our lab to have the test done at a later time.    Telephone visits are billed at different rates depending on your insurance coverage. During this emergency period, for some insurers they may be billed the same as an in-person visit.  Please reach out to your insurance provider with any questions.    If during the course of the call the physician/provider feels a telephone visit is not appropriate, you will not be charged for this service.\"    Patient has given verbal consent for Telephone visit?  Yes    How would you like to obtain your AVS? Mail a copy    Jyoti De Jesus complains of   Chief Complaint   Patient presents with     RECHECK     DOS 01/05/20 Pelvic fixation/lumbar instumentation for sacral & Lumbar fx       ALLERGIES  Patient has no known allergies.        Patient is walking standing and doing activities as tolerated. She can stand at a counter to fix a meal. Her daughter has been checking the incisions and they appear well healed.                Objective   X rays from [facility] 3 view pelvis instrumentation in place and appears stable. No lateral.        Assessment/Plan:  Pelvis fx s/p ORIF doing quite well. FOllow up in 3 months AP lat and Rausch of pelvis.    Phone call duration:  10 minutes    Wai Mason MD            Reason For Visit:   Chief Complaint   Patient presents with     RECHECK    "  DOS 01/05/20 Pelvic fixation/lumbar instumentation for sacral & Lumbar fx       Primary MD: Jet Matt    There were no vitals taken for this visit.    Pain Assessment  Patient Currently in Pain: Denies  Primary Pain Location: Back    Oswestry (TRANG) Questionnaire    OSWESTRY DISABILITY INDEX 4/9/2020   Count 10   Sum 4   Oswestry Score (%) 8        Visual Analog Pain Scale  Back Pain Scale 0-10: 0  Right leg pain: 0  Left leg pain: 0    Promis 10 Assessment    PROMIS 10 2/20/2020   In general, would you say your health is: Very good   In general, would you say your quality of life is: Very good   In general, how would you rate your physical health? Very good   In general, how would you rate your mental health, including your mood and your ability to think? Very good   In general, how would you rate your satisfaction with your social activities and relationships? Very good   In general, please rate how well you carry out your usual social activities and roles Good   To what extent are you able to carry out your everyday physical activities such as walking, climbing stairs, carrying groceries, or moving a chair? A little   How often have you been bothered by emotional problems such as feeling anxious, depressed or irritable? Rarely   How would you rate your fatigue on average? None   How would you rate your pain on average?   0 = No Pain  to  10 = Worst Imaginable Pain 0   In general, would you say your health is: 4   In general, would you say your quality of life is: 4   In general, how would you rate your physical health? 4   In general, how would you rate your mental health, including your mood and your ability to think? 4   In general, how would you rate your satisfaction with your social activities and relationships? 4   In general, please rate how well you carry out your usual social activities and roles. (This includes activities at home, at work and in your community, and responsibilities as a  parent, child, spouse, employee, friend, etc.) 3   To what extent are you able to carry out your everyday physical activities such as walking, climbing stairs, carrying groceries, or moving a chair? 2   In the past 7 days, how often have you been bothered by emotional problems such as feeling anxious, depressed, or irritable? 2   In the past 7 days, how would you rate your fatigue on average? 1   In the past 7 days, how would you rate your pain on average, where 0 means no pain, and 10 means worst imaginable pain? 0   Global Mental Health Score 16   Global Physical Health Score 16   PROMIS TOTAL - SUBSCORES 32          Karissa Busby ATC

## 2020-04-10 ENCOUNTER — TELEPHONE (OUTPATIENT)
Dept: ORTHOPEDICS | Facility: CLINIC | Age: 85
End: 2020-04-10

## 2020-04-10 NOTE — TELEPHONE ENCOUNTER
Called and spoke to Jyoti. Offered to schedule 3 month Follow up with Dr Mason. Pt stated right now wasn't a good time and would call to schedule that appointment.

## 2020-04-13 DIAGNOSIS — Z98.890 S/P SPINAL SURGERY: Primary | ICD-10-CM

## 2020-07-13 DIAGNOSIS — Z98.890 S/P SPINAL SURGERY: Primary | ICD-10-CM

## 2020-07-17 DIAGNOSIS — Z98.890 S/P SPINAL SURGERY: Primary | ICD-10-CM

## 2020-07-20 ENCOUNTER — TRANSFERRED RECORDS (OUTPATIENT)
Dept: HEALTH INFORMATION MANAGEMENT | Facility: CLINIC | Age: 85
End: 2020-07-20

## 2020-07-22 ENCOUNTER — VIRTUAL VISIT (OUTPATIENT)
Dept: ORTHOPEDICS | Facility: CLINIC | Age: 85
End: 2020-07-22
Payer: COMMERCIAL

## 2020-07-22 DIAGNOSIS — M84.48XD SACRAL INSUFFICIENCY FRACTURE WITH ROUTINE HEALING, SUBSEQUENT ENCOUNTER: Primary | ICD-10-CM

## 2020-07-22 DIAGNOSIS — Z98.890 S/P SPINAL SURGERY: ICD-10-CM

## 2020-07-22 NOTE — LETTER
7/22/2020         RE: Jyoti De Jesus  [address]  [facility] [address]        Dear Colleague,    Thank you for referring your patient, Jyoti De Jesus, to the [facility]. Please see a copy of my visit note below.    Reason For Visit:   Chief Complaint   Patient presents with     RECHECK     DOS 01/05/20 Pelvic fixation and lumbar instumentation for sacral & Lumbar fractures per pt daughter ok for virtual visit       Primary MD: Jet Matt  Ref. MD: est    ?  No  Currently working? No.  Work status?  Retired.     Date of injury: 12/2019  Type of injury: Fell in the garage.     Date of surgery: 1/5/2020  Type of surgery: Pelvic fixation.   Segmental spinal instrumentation L4 to S1.   Cement augmentation L4 and L5.    Image-guided surgery.      Smoker: No  Request smoking cessation information: No    There were no vitals taken for this visit.    Pain Assessment  Patient Currently in Pain: Denies    Oswestry (TRANG) Questionnaire    OSWESTRY DISABILITY INDEX 7/22/2020   Count 9   Sum 10   Oswestry Score (%) 22.22              Visual Analog Pain Scale  Back Pain Scale 0-10: 0  Right leg pain: 0  Left leg pain: 0  Neck Pain Scale 0-10: 0  Right arm pain: 0  Left arm pain: 0    Promis 10 Assessment    PROMIS 10 7/22/2020   In general, would you say your health is: Very good   In general, would you say your quality of life is: Very good   In general, how would you rate your physical health? Very good   In general, how would you rate your mental health, including your mood and your ability to think? Very good   In general, how would you rate your satisfaction with your social activities and relationships? Very good   In general, please rate how well you carry out your usual social activities and roles Very good   To what extent are you able to carry out your everyday physical activities such as walking, climbing stairs, carrying groceries, or moving a chair?  "Completely   How often have you been bothered by emotional problems such as feeling anxious, depressed or irritable? Never   How would you rate your fatigue on average? Mild   How would you rate your pain on average?   0 = No Pain  to  10 = Worst Imaginable Pain 0   In general, would you say your health is: 4   In general, would you say your quality of life is: 4   In general, how would you rate your physical health? 4   In general, how would you rate your mental health, including your mood and your ability to think? 4   In general, how would you rate your satisfaction with your social activities and relationships? 4   In general, please rate how well you carry out your usual social activities and roles. (This includes activities at home, at work and in your community, and responsibilities as a parent, child, spouse, employee, friend, etc.) 4   To what extent are you able to carry out your everyday physical activities such as walking, climbing stairs, carrying groceries, or moving a chair? 5   In the past 7 days, how often have you been bothered by emotional problems such as feeling anxious, depressed, or irritable? 1   In the past 7 days, how would you rate your fatigue on average? 2   In the past 7 days, how would you rate your pain on average, where 0 means no pain, and 10 means worst imaginable pain? 0   Global Mental Health Score 17   Global Physical Health Score 18   PROMIS TOTAL - SUBSCORES 35                Lynn Hammer LPN    Video Visit Technology for this patient: No video technology available to patient, please call patient over the phone;    Jyoti De Jesus is a 89 year old female who is being evaluated via a billable telephone visit.      The patient has been notified of following:     \"This telephone visit will be conducted via a call between you and your physician/provider. We have found that certain health care needs can be provided without the need for a physical exam.  This service " "lets us provide the care you need with a short phone conversation.  If a prescription is necessary we can send it directly to your pharmacy.  If lab work is needed we can place an order for that and you can then stop by our lab to have the test done at a later time.    Telephone visits are billed at different rates depending on your insurance coverage. During this emergency period, for some insurers they may be billed the same as an in-person visit.  Please reach out to your insurance provider with any questions.    If during the course of the call the physician/provider feels a telephone visit is not appropriate, you will not be charged for this service.\"    Patient has given verbal consent for Telephone visit?  yes    What phone number would you like to be contacted at? [phone]    How would you like to obtain your AVS? Mail a copy     Ambulates essentially independent in house. Uses cane outside of house. No pain meds (rare Tylenol). Has complete bowel and bladder control now.     Outside x rays show instrumentation in place.     A/P: Pelvic insufficiency fracture s/p ORIF.  Continue activities as tolerated.  She would be happy to share her story so that others in this situation can have the hope of a recovery like she has had. F/U x rays at 1 year.    Phone call duration: 13 minutes      Wai Mason MD        "

## 2020-07-22 NOTE — PROGRESS NOTES
Reason For Visit:   Chief Complaint   Patient presents with     RECHECK     DOS 01/05/20 Pelvic fixation and lumbar instumentation for sacral & Lumbar fractures per pt daughter ok for virtual visit       Primary MD: Jet Matt  Ref. MD: est    ?  No  Currently working? No.  Work status?  Retired.     Date of injury: 12/2019  Type of injury: Fell in the garage.     Date of surgery: 1/5/2020  Type of surgery: Pelvic fixation.   Segmental spinal instrumentation L4 to S1.   Cement augmentation L4 and L5.    Image-guided surgery.      Smoker: No  Request smoking cessation information: No    There were no vitals taken for this visit.    Pain Assessment  Patient Currently in Pain: Denies    Oswestry (TRANG) Questionnaire    OSWESTRY DISABILITY INDEX 7/22/2020   Count 9   Sum 10   Oswestry Score (%) 22.22              Visual Analog Pain Scale  Back Pain Scale 0-10: 0  Right leg pain: 0  Left leg pain: 0  Neck Pain Scale 0-10: 0  Right arm pain: 0  Left arm pain: 0    Promis 10 Assessment    PROMIS 10 7/22/2020   In general, would you say your health is: Very good   In general, would you say your quality of life is: Very good   In general, how would you rate your physical health? Very good   In general, how would you rate your mental health, including your mood and your ability to think? Very good   In general, how would you rate your satisfaction with your social activities and relationships? Very good   In general, please rate how well you carry out your usual social activities and roles Very good   To what extent are you able to carry out your everyday physical activities such as walking, climbing stairs, carrying groceries, or moving a chair? Completely   How often have you been bothered by emotional problems such as feeling anxious, depressed or irritable? Never   How would you rate your fatigue on average? Mild   How would you rate your pain on average?   0 = No Pain  to  10 = Worst Imaginable Pain  "0   In general, would you say your health is: 4   In general, would you say your quality of life is: 4   In general, how would you rate your physical health? 4   In general, how would you rate your mental health, including your mood and your ability to think? 4   In general, how would you rate your satisfaction with your social activities and relationships? 4   In general, please rate how well you carry out your usual social activities and roles. (This includes activities at home, at work and in your community, and responsibilities as a parent, child, spouse, employee, friend, etc.) 4   To what extent are you able to carry out your everyday physical activities such as walking, climbing stairs, carrying groceries, or moving a chair? 5   In the past 7 days, how often have you been bothered by emotional problems such as feeling anxious, depressed, or irritable? 1   In the past 7 days, how would you rate your fatigue on average? 2   In the past 7 days, how would you rate your pain on average, where 0 means no pain, and 10 means worst imaginable pain? 0   Global Mental Health Score 17   Global Physical Health Score 18   PROMIS TOTAL - SUBSCORES 35                Lynn Hammer LPN    Video Visit Technology for this patient: No video technology available to patient, please call patient over the phone;    Jyoti De Jesus is a 89 year old female who is being evaluated via a billable telephone visit.      The patient has been notified of following:     \"This telephone visit will be conducted via a call between you and your physician/provider. We have found that certain health care needs can be provided without the need for a physical exam.  This service lets us provide the care you need with a short phone conversation.  If a prescription is necessary we can send it directly to your pharmacy.  If lab work is needed we can place an order for that and you can then stop by our lab to have the test done at a later " "time.    Telephone visits are billed at different rates depending on your insurance coverage. During this emergency period, for some insurers they may be billed the same as an in-person visit.  Please reach out to your insurance provider with any questions.    If during the course of the call the physician/provider feels a telephone visit is not appropriate, you will not be charged for this service.\"    Patient has given verbal consent for Telephone visit?  yes    What phone number would you like to be contacted at? [phone]    How would you like to obtain your AVS? Mail a copy     Ambulates essentially independent in house. Uses cane outside of house. No pain meds (rare Tylenol). Has complete bowel and bladder control now.     Outside x rays show instrumentation in place.     A/P: Pelvic insufficiency fracture s/p ORIF.  Continue activities as tolerated.  She would be happy to share her story so that others in this situation can have the hope of a recovery like she has had. F/U x rays at 1 year.    Phone call duration: 13 minutes      Wai Mason MD      "

## 2020-11-02 ENCOUNTER — RECORDS - HEALTHEAST (OUTPATIENT)
Dept: LAB | Facility: CLINIC | Age: 85
End: 2020-11-02

## 2020-11-02 LAB
ANION GAP SERPL CALCULATED.3IONS-SCNC: 9 MMOL/L (ref 5–18)
BUN SERPL-MCNC: 23 MG/DL (ref 8–28)
CALCIUM SERPL-MCNC: 9.7 MG/DL (ref 8.5–10.5)
CHLORIDE BLD-SCNC: 104 MMOL/L (ref 98–107)
CO2 SERPL-SCNC: 26 MMOL/L (ref 22–31)
CREAT SERPL-MCNC: 1.14 MG/DL (ref 0.6–1.1)
GFR SERPL CREATININE-BSD FRML MDRD: 45 ML/MIN/1.73M2
GLUCOSE BLD-MCNC: 126 MG/DL (ref 70–125)
POTASSIUM BLD-SCNC: 4 MMOL/L (ref 3.5–5)
SODIUM SERPL-SCNC: 139 MMOL/L (ref 136–145)
TSH SERPL DL<=0.005 MIU/L-ACNC: 0.45 UIU/ML (ref 0.3–5)

## 2020-11-03 LAB — 25(OH)D3 SERPL-MCNC: 62.1 NG/ML (ref 30–80)

## 2021-02-25 ENCOUNTER — RECORDS - HEALTHEAST (OUTPATIENT)
Dept: LAB | Facility: CLINIC | Age: 86
End: 2021-02-25

## 2021-02-25 LAB
ANION GAP SERPL CALCULATED.3IONS-SCNC: 7 MMOL/L (ref 5–18)
BUN SERPL-MCNC: 23 MG/DL (ref 8–28)
CALCIUM SERPL-MCNC: 9.6 MG/DL (ref 8.5–10.5)
CHLORIDE BLD-SCNC: 104 MMOL/L (ref 98–107)
CO2 SERPL-SCNC: 30 MMOL/L (ref 22–31)
CREAT SERPL-MCNC: 0.97 MG/DL (ref 0.6–1.1)
GFR SERPL CREATININE-BSD FRML MDRD: 54 ML/MIN/1.73M2
GLUCOSE BLD-MCNC: 91 MG/DL (ref 70–125)
POTASSIUM BLD-SCNC: 4.4 MMOL/L (ref 3.5–5)
SODIUM SERPL-SCNC: 141 MMOL/L (ref 136–145)

## 2021-11-04 ENCOUNTER — LAB REQUISITION (OUTPATIENT)
Dept: LAB | Facility: CLINIC | Age: 86
End: 2021-11-04
Payer: COMMERCIAL

## 2021-11-04 DIAGNOSIS — M81.0 AGE-RELATED OSTEOPOROSIS WITHOUT CURRENT PATHOLOGICAL FRACTURE: ICD-10-CM

## 2021-11-04 DIAGNOSIS — E03.9 HYPOTHYROIDISM, UNSPECIFIED: ICD-10-CM

## 2021-11-04 LAB
ANION GAP SERPL CALCULATED.3IONS-SCNC: 11 MMOL/L (ref 5–18)
BUN SERPL-MCNC: 17 MG/DL (ref 8–28)
CALCIUM SERPL-MCNC: 9.7 MG/DL (ref 8.5–10.5)
CHLORIDE BLD-SCNC: 104 MMOL/L (ref 98–107)
CO2 SERPL-SCNC: 26 MMOL/L (ref 22–31)
CREAT SERPL-MCNC: 0.99 MG/DL (ref 0.6–1.1)
GFR SERPL CREATININE-BSD FRML MDRD: 49 ML/MIN/1.73M2
GLUCOSE BLD-MCNC: 95 MG/DL (ref 70–125)
POTASSIUM BLD-SCNC: 4.1 MMOL/L (ref 3.5–5)
SODIUM SERPL-SCNC: 141 MMOL/L (ref 136–145)
T4 FREE SERPL-MCNC: 1.22 NG/DL (ref 0.7–1.8)
TSH SERPL DL<=0.005 MIU/L-ACNC: 0.18 UIU/ML (ref 0.3–5)

## 2021-11-04 PROCEDURE — 84439 ASSAY OF FREE THYROXINE: CPT | Mod: ORL | Performed by: FAMILY MEDICINE

## 2021-11-04 PROCEDURE — 82652 VIT D 1 25-DIHYDROXY: CPT | Mod: ORL | Performed by: FAMILY MEDICINE

## 2021-11-04 PROCEDURE — 80048 BASIC METABOLIC PNL TOTAL CA: CPT | Mod: ORL | Performed by: FAMILY MEDICINE

## 2021-11-04 PROCEDURE — 84443 ASSAY THYROID STIM HORMONE: CPT | Mod: ORL | Performed by: FAMILY MEDICINE

## 2021-11-10 LAB — 1,25(OH)2D SERPL-MCNC: 44 PG/ML (ref 19.9–79.3)

## 2022-01-11 ENCOUNTER — LAB REQUISITION (OUTPATIENT)
Dept: LAB | Facility: CLINIC | Age: 87
End: 2022-01-11
Payer: COMMERCIAL

## 2022-01-11 DIAGNOSIS — E03.9 HYPOTHYROIDISM, UNSPECIFIED: ICD-10-CM

## 2022-01-11 LAB
T4 FREE SERPL-MCNC: 1.03 NG/DL (ref 0.7–1.8)
TSH SERPL DL<=0.005 MIU/L-ACNC: 5.5 UIU/ML (ref 0.3–5)

## 2022-01-11 PROCEDURE — 84439 ASSAY OF FREE THYROXINE: CPT | Mod: ORL | Performed by: FAMILY MEDICINE

## 2022-01-11 PROCEDURE — 84443 ASSAY THYROID STIM HORMONE: CPT | Mod: ORL | Performed by: FAMILY MEDICINE

## 2022-05-13 ENCOUNTER — LAB REQUISITION (OUTPATIENT)
Dept: LAB | Facility: CLINIC | Age: 87
End: 2022-05-13
Payer: COMMERCIAL

## 2022-05-13 DIAGNOSIS — E03.9 HYPOTHYROIDISM, UNSPECIFIED: ICD-10-CM

## 2022-05-13 LAB
T4 FREE SERPL-MCNC: 0.95 NG/DL (ref 0.7–1.8)
TSH SERPL DL<=0.005 MIU/L-ACNC: 5.74 UIU/ML (ref 0.3–5)

## 2022-05-13 PROCEDURE — 84439 ASSAY OF FREE THYROXINE: CPT | Mod: ORL | Performed by: FAMILY MEDICINE

## 2022-05-13 PROCEDURE — 84443 ASSAY THYROID STIM HORMONE: CPT | Mod: ORL | Performed by: FAMILY MEDICINE

## 2023-04-13 ENCOUNTER — LAB REQUISITION (OUTPATIENT)
Dept: LAB | Facility: CLINIC | Age: 88
End: 2023-04-13
Payer: COMMERCIAL

## 2023-04-13 DIAGNOSIS — M81.0 AGE-RELATED OSTEOPOROSIS WITHOUT CURRENT PATHOLOGICAL FRACTURE: ICD-10-CM

## 2023-04-13 DIAGNOSIS — E03.9 HYPOTHYROIDISM, UNSPECIFIED: ICD-10-CM

## 2023-04-13 DIAGNOSIS — N18.31 CHRONIC KIDNEY DISEASE, STAGE 3A (H): ICD-10-CM

## 2023-04-13 PROCEDURE — 82607 VITAMIN B-12: CPT | Mod: ORL | Performed by: FAMILY MEDICINE

## 2023-04-13 PROCEDURE — 84443 ASSAY THYROID STIM HORMONE: CPT | Mod: ORL | Performed by: FAMILY MEDICINE

## 2023-04-13 PROCEDURE — 82306 VITAMIN D 25 HYDROXY: CPT | Mod: ORL | Performed by: FAMILY MEDICINE

## 2023-04-13 PROCEDURE — 80048 BASIC METABOLIC PNL TOTAL CA: CPT | Mod: ORL | Performed by: FAMILY MEDICINE

## 2023-04-13 PROCEDURE — 84439 ASSAY OF FREE THYROXINE: CPT | Mod: ORL | Performed by: FAMILY MEDICINE

## 2023-04-14 LAB
ANION GAP SERPL CALCULATED.3IONS-SCNC: 13 MMOL/L (ref 7–15)
BUN SERPL-MCNC: 25.9 MG/DL (ref 8–23)
CALCIUM SERPL-MCNC: 10.1 MG/DL (ref 8.2–9.6)
CHLORIDE SERPL-SCNC: 101 MMOL/L (ref 98–107)
CREAT SERPL-MCNC: 1.12 MG/DL (ref 0.51–0.95)
DEPRECATED CALCIDIOL+CALCIFEROL SERPL-MC: 80 UG/L (ref 20–75)
DEPRECATED HCO3 PLAS-SCNC: 27 MMOL/L (ref 22–29)
GFR SERPL CREATININE-BSD FRML MDRD: 45 ML/MIN/1.73M2
GLUCOSE SERPL-MCNC: 82 MG/DL (ref 70–99)
POTASSIUM SERPL-SCNC: 4.5 MMOL/L (ref 3.4–5.3)
SODIUM SERPL-SCNC: 141 MMOL/L (ref 136–145)
T4 FREE SERPL-MCNC: 1.03 NG/DL (ref 0.9–1.7)
TSH SERPL DL<=0.005 MIU/L-ACNC: 8.44 UIU/ML (ref 0.3–4.2)
VIT B12 SERPL-MCNC: 486 PG/ML (ref 232–1245)

## 2023-06-01 ENCOUNTER — LAB REQUISITION (OUTPATIENT)
Dept: LAB | Facility: CLINIC | Age: 88
End: 2023-06-01
Payer: COMMERCIAL

## 2023-06-01 DIAGNOSIS — E03.9 HYPOTHYROIDISM, UNSPECIFIED: ICD-10-CM

## 2023-06-01 LAB — TSH SERPL DL<=0.005 MIU/L-ACNC: 0.89 UIU/ML (ref 0.3–4.2)

## 2023-06-01 PROCEDURE — 84443 ASSAY THYROID STIM HORMONE: CPT | Mod: ORL | Performed by: FAMILY MEDICINE

## 2023-11-16 NOTE — ANESTHESIA PROCEDURE NOTES
Arterial Line Procedure Note  Staff:     Anesthesiologist:  Antonino Stiles MD  Location: In OR After Induction  Procedure Start/Stop Times:     patient identified, IV checked, site marked, risks and benefits discussed, informed consent, monitors and equipment checked, pre-op evaluation and at physician/surgeon's request      Correct Patient: Yes      Correct Position: Yes      Correct Site: Yes      Correct Procedure: Yes      Correct Laterality:  Yes    Site Marked:  Yes  Line Placement:     Procedure:  Arterial Line    Insertion Site:  Radial    Insertion laterality:  Right    Skin Prep: Chloraprep      Patient Prep: mask, hat and hand hygiene      Local skin infiltration:  None    Ultrasound Guided?: Yes      Artery evaluated via ultrasound confirming patency.   Using realtime imaging, the artery was punctured and the needle was observed entering the artery.      A permanent image is NOT entered into the patient's record.      Catheter size:  20 gauge, Quick cath    Dressing:  Tegaderm    Complications:  None obvious    Arterial waveform: Yes      IBP within 10% of NIBP: Yes           yes...

## 2024-06-15 ENCOUNTER — LAB REQUISITION (OUTPATIENT)
Dept: LAB | Facility: CLINIC | Age: 89
End: 2024-06-15
Payer: COMMERCIAL

## 2024-06-15 DIAGNOSIS — D64.9 ANEMIA, UNSPECIFIED: ICD-10-CM

## 2024-06-17 LAB — HGB BLD-MCNC: 9.6 G/DL (ref 11.7–15.7)

## 2024-06-17 PROCEDURE — 36415 COLL VENOUS BLD VENIPUNCTURE: CPT | Mod: ORL | Performed by: NURSE PRACTITIONER

## 2024-06-17 PROCEDURE — P9604 ONE-WAY ALLOW PRORATED TRIP: HCPCS | Mod: ORL | Performed by: NURSE PRACTITIONER

## 2024-06-17 PROCEDURE — 85018 HEMOGLOBIN: CPT | Mod: ORL | Performed by: NURSE PRACTITIONER

## 2024-07-09 ENCOUNTER — LAB REQUISITION (OUTPATIENT)
Dept: LAB | Facility: CLINIC | Age: 89
End: 2024-07-09
Payer: COMMERCIAL

## 2024-07-09 DIAGNOSIS — E03.9 HYPOTHYROIDISM, UNSPECIFIED: ICD-10-CM

## 2024-07-09 DIAGNOSIS — M81.0 AGE-RELATED OSTEOPOROSIS WITHOUT CURRENT PATHOLOGICAL FRACTURE: ICD-10-CM

## 2024-07-09 DIAGNOSIS — N18.31 CHRONIC KIDNEY DISEASE, STAGE 3A (H): ICD-10-CM

## 2024-07-09 PROCEDURE — 84439 ASSAY OF FREE THYROXINE: CPT | Mod: ORL | Performed by: FAMILY MEDICINE

## 2024-07-09 PROCEDURE — 84443 ASSAY THYROID STIM HORMONE: CPT | Mod: ORL | Performed by: FAMILY MEDICINE

## 2024-07-09 PROCEDURE — 80048 BASIC METABOLIC PNL TOTAL CA: CPT | Mod: ORL | Performed by: FAMILY MEDICINE

## 2024-07-09 PROCEDURE — 82306 VITAMIN D 25 HYDROXY: CPT | Mod: ORL | Performed by: FAMILY MEDICINE

## 2024-07-10 LAB
ANION GAP SERPL CALCULATED.3IONS-SCNC: 11 MMOL/L (ref 7–15)
BUN SERPL-MCNC: 19 MG/DL (ref 8–23)
CALCIUM SERPL-MCNC: 9.6 MG/DL (ref 8.2–9.6)
CHLORIDE SERPL-SCNC: 100 MMOL/L (ref 98–107)
CREAT SERPL-MCNC: 0.94 MG/DL (ref 0.51–0.95)
DEPRECATED HCO3 PLAS-SCNC: 24 MMOL/L (ref 22–29)
EGFRCR SERPLBLD CKD-EPI 2021: 56 ML/MIN/1.73M2
GLUCOSE SERPL-MCNC: 119 MG/DL (ref 70–99)
POTASSIUM SERPL-SCNC: 4.4 MMOL/L (ref 3.4–5.3)
SODIUM SERPL-SCNC: 135 MMOL/L (ref 135–145)
TSH SERPL DL<=0.005 MIU/L-ACNC: 4.5 UIU/ML (ref 0.3–4.2)
VIT D+METAB SERPL-MCNC: 80 NG/ML (ref 20–50)

## 2024-07-11 ENCOUNTER — LAB REQUISITION (OUTPATIENT)
Dept: LAB | Facility: CLINIC | Age: 89
End: 2024-07-11
Payer: COMMERCIAL

## 2024-07-11 DIAGNOSIS — E03.9 HYPOTHYROIDISM, UNSPECIFIED: ICD-10-CM

## 2024-07-11 LAB — T4 FREE SERPL-MCNC: 1.38 NG/DL (ref 0.9–1.7)

## 2025-01-23 ENCOUNTER — LAB REQUISITION (OUTPATIENT)
Dept: LAB | Facility: CLINIC | Age: OVER 89
End: 2025-01-23
Payer: COMMERCIAL

## 2025-01-23 DIAGNOSIS — N18.31 CHRONIC KIDNEY DISEASE, STAGE 3A (H): ICD-10-CM

## 2025-01-23 DIAGNOSIS — E03.9 HYPOTHYROIDISM, UNSPECIFIED: ICD-10-CM

## 2025-01-23 LAB
ANION GAP SERPL CALCULATED.3IONS-SCNC: 10 MMOL/L (ref 7–15)
BUN SERPL-MCNC: 27.5 MG/DL (ref 8–23)
CALCIUM SERPL-MCNC: 10.3 MG/DL (ref 8.8–10.4)
CHLORIDE SERPL-SCNC: 103 MMOL/L (ref 98–107)
CREAT SERPL-MCNC: 1.06 MG/DL (ref 0.51–0.95)
EGFRCR SERPLBLD CKD-EPI 2021: 48 ML/MIN/1.73M2
GLUCOSE SERPL-MCNC: 93 MG/DL (ref 70–99)
HCO3 SERPL-SCNC: 26 MMOL/L (ref 22–29)
POTASSIUM SERPL-SCNC: 4.1 MMOL/L (ref 3.4–5.3)
SODIUM SERPL-SCNC: 139 MMOL/L (ref 135–145)
TSH SERPL DL<=0.005 MIU/L-ACNC: 0.51 UIU/ML (ref 0.3–4.2)

## 2025-01-23 PROCEDURE — 84443 ASSAY THYROID STIM HORMONE: CPT | Mod: ORL | Performed by: FAMILY MEDICINE

## 2025-01-23 PROCEDURE — 80048 BASIC METABOLIC PNL TOTAL CA: CPT | Mod: ORL | Performed by: FAMILY MEDICINE

## 2025-05-27 ENCOUNTER — LAB REQUISITION (OUTPATIENT)
Dept: LAB | Facility: CLINIC | Age: OVER 89
End: 2025-05-27
Payer: COMMERCIAL

## 2025-05-27 DIAGNOSIS — E03.9 HYPOTHYROIDISM, UNSPECIFIED: ICD-10-CM

## 2025-05-27 DIAGNOSIS — N18.31 CHRONIC KIDNEY DISEASE, STAGE 3A (H): ICD-10-CM

## 2025-05-27 PROCEDURE — 80048 BASIC METABOLIC PNL TOTAL CA: CPT | Mod: ORL | Performed by: FAMILY MEDICINE

## 2025-05-27 PROCEDURE — 84443 ASSAY THYROID STIM HORMONE: CPT | Mod: ORL | Performed by: FAMILY MEDICINE

## 2025-05-28 LAB
ANION GAP SERPL CALCULATED.3IONS-SCNC: 13 MMOL/L (ref 7–15)
BUN SERPL-MCNC: 24.8 MG/DL (ref 8–23)
CALCIUM SERPL-MCNC: 10 MG/DL (ref 8.8–10.4)
CHLORIDE SERPL-SCNC: 102 MMOL/L (ref 98–107)
CREAT SERPL-MCNC: 1.04 MG/DL (ref 0.51–0.95)
EGFRCR SERPLBLD CKD-EPI 2021: 49 ML/MIN/1.73M2
GLUCOSE SERPL-MCNC: 91 MG/DL (ref 70–99)
HCO3 SERPL-SCNC: 25 MMOL/L (ref 22–29)
POTASSIUM SERPL-SCNC: 4.4 MMOL/L (ref 3.4–5.3)
SODIUM SERPL-SCNC: 140 MMOL/L (ref 135–145)
TSH SERPL DL<=0.005 MIU/L-ACNC: 0.34 UIU/ML (ref 0.3–4.2)

## (undated) DEVICE — GLOVE PROTEXIS W/NEU-THERA 8.5  2D73TE85

## (undated) DEVICE — BASIN SET MAJOR

## (undated) DEVICE — DRSG AQUACEL AG 3.5X9.75" HYDROFIBER 412011

## (undated) DEVICE — Device

## (undated) DEVICE — DRAPE POUCH INSTRUMENT 1018

## (undated) DEVICE — DRAPE IOBAN INCISE 23X17" 6650EZ

## (undated) DEVICE — STPL SKIN 35W ROTATING HEAD PRW35

## (undated) DEVICE — GOWN IMPERVIOUS SPECIALTY XLG/XLONG 32474

## (undated) DEVICE — GLOVE PROTEXIS POWDER FREE 8.5 ORTHOPEDIC 2D73ET85

## (undated) DEVICE — ESU GROUND PAD UNIVERSAL W/O CORD

## (undated) DEVICE — SPONGE SURGIFOAM 12 1972

## (undated) DEVICE — LINEN BACK PACK 5440

## (undated) DEVICE — ESU ELEC BLADE 2.75" COATED/INSULATED E1455

## (undated) DEVICE — SU VICRYL 1 CT-1 36" UND J947H

## (undated) DEVICE — DRSG STERI STRIP 1/2X4" R1547

## (undated) DEVICE — LINEN TOWEL PACK X5 5464

## (undated) DEVICE — SU VICRYL 2-0 CT-2 27" UND J269H

## (undated) DEVICE — CELL SAVER

## (undated) DEVICE — DRILL BIT QUICK COUPLING CAN 5.0X300MM 310.63

## (undated) DEVICE — POSITIONER ARMBOARD FOAM 1PAIR LF FP-ARMB1

## (undated) DEVICE — SU VICRYL 1 CT-1 CR 8X18" J741D

## (undated) DEVICE — DRSG TEGADERM 2 3/8X2 3/4" 1624W

## (undated) DEVICE — PREP DURAPREP REMOVER 4OZ 8611

## (undated) DEVICE — DRSG PRIMAPORE 03 1/8X6" 66000318

## (undated) DEVICE — IMPLANTABLE DEVICE
Type: IMPLANTABLE DEVICE | Site: BACK | Status: NON-FUNCTIONAL
Removed: 2020-01-05

## (undated) DEVICE — PREP DURAPREP 26ML APL 8630

## (undated) DEVICE — DRAPE STERI TOWEL LG 1010

## (undated) DEVICE — SUCTION MANIFOLD DORNOCH ULTRA CART UL-CL500

## (undated) DEVICE — SOL WATER IRRIG 1000ML BOTTLE 2F7114

## (undated) DEVICE — GLOVE PROTEXIS W/NEU-THERA 8.0  2D73TE80

## (undated) DEVICE — SYSTEM KYPHX BONE CEMENT FILLER DEVICE 3  F04B

## (undated) DEVICE — GOWN IMPERVIOUS ZONED XLG 9041

## (undated) DEVICE — SU MONOCRYL 4-0 RB-1 27" Y214H

## (undated) DEVICE — PACK UNIVERSAL SPLIT 29131

## (undated) DEVICE — WIRE GUIDE SYN 2.8X450MM THRD 900.726

## (undated) DEVICE — DRAIN HEMOVAC RESERVOIR KIT 10FR 1/8" MED 00-2550-002-10

## (undated) DEVICE — GLOVE PROTEXIS BLUE W/NEU-THERA 8.5  2D73EB85

## (undated) DEVICE — DRAPE O ARM TUBE 9732722

## (undated) DEVICE — MARKER SPHERES PASSIVE MEDT PACK 5 8801075

## (undated) RX ORDER — PHENYLEPHRINE HCL IN 0.9% NACL 1 MG/10 ML
SYRINGE (ML) INTRAVENOUS
Status: DISPENSED
Start: 2020-01-05

## (undated) RX ORDER — CEFAZOLIN SODIUM 1 G/3ML
INJECTION, POWDER, FOR SOLUTION INTRAMUSCULAR; INTRAVENOUS
Status: DISPENSED
Start: 2020-01-05

## (undated) RX ORDER — VANCOMYCIN HYDROCHLORIDE 1 G/20ML
INJECTION, POWDER, LYOPHILIZED, FOR SOLUTION INTRAVENOUS
Status: DISPENSED
Start: 2020-01-05

## (undated) RX ORDER — FENTANYL CITRATE 50 UG/ML
INJECTION, SOLUTION INTRAMUSCULAR; INTRAVENOUS
Status: DISPENSED
Start: 2020-01-05

## (undated) RX ORDER — HYDROMORPHONE HYDROCHLORIDE 1 MG/ML
INJECTION, SOLUTION INTRAMUSCULAR; INTRAVENOUS; SUBCUTANEOUS
Status: DISPENSED
Start: 2020-01-05

## (undated) RX ORDER — KETAMINE HCL IN 0.9 % NACL 50 MG/5 ML
SYRINGE (ML) INTRAVENOUS
Status: DISPENSED
Start: 2020-01-05

## (undated) RX ORDER — BUPIVACAINE HYDROCHLORIDE AND EPINEPHRINE 2.5; 5 MG/ML; UG/ML
INJECTION, SOLUTION INFILTRATION; PERINEURAL
Status: DISPENSED
Start: 2020-01-05

## (undated) RX ORDER — OXYCODONE HYDROCHLORIDE 5 MG/1
TABLET ORAL
Status: DISPENSED
Start: 2020-01-05

## (undated) RX ORDER — LIDOCAINE HYDROCHLORIDE 20 MG/ML
INJECTION, SOLUTION EPIDURAL; INFILTRATION; INTRACAUDAL; PERINEURAL
Status: DISPENSED
Start: 2020-01-05